# Patient Record
Sex: FEMALE | Race: WHITE | NOT HISPANIC OR LATINO | Employment: UNEMPLOYED | URBAN - METROPOLITAN AREA
[De-identification: names, ages, dates, MRNs, and addresses within clinical notes are randomized per-mention and may not be internally consistent; named-entity substitution may affect disease eponyms.]

---

## 2017-03-18 RX ORDER — GLUCOSAMINE/CHONDR SU A SOD 750-600 MG
5000 TABLET ORAL DAILY
COMMUNITY
End: 2022-03-21

## 2017-03-18 RX ORDER — ACETYLGLUCOSAMINE 700 MG
2000 CAPSULE ORAL 3 TIMES DAILY
COMMUNITY
End: 2018-04-11

## 2017-03-18 RX ORDER — VITAMIN E 268 MG
1000 CAPSULE ORAL DAILY
COMMUNITY

## 2017-03-18 RX ORDER — IBUPROFEN 200 MG
200 TABLET ORAL EVERY 6 HOURS PRN
COMMUNITY
End: 2017-10-16

## 2017-03-18 RX ORDER — LANOLIN ALCOHOL/MO/W.PET/CERES
3000 CREAM (GRAM) TOPICAL DAILY
COMMUNITY

## 2017-03-18 RX ORDER — MINERAL OIL
180 ENEMA (ML) RECTAL DAILY
COMMUNITY
End: 2018-04-11

## 2017-03-18 RX ORDER — CHOLECALCIFEROL (VITAMIN D3) 25 MCG
2000 TABLET ORAL 2 TIMES DAILY
COMMUNITY

## 2017-03-18 RX ORDER — OMEGA-3-ACID ETHYL ESTERS 1 G/1
2 CAPSULE, LIQUID FILLED ORAL DAILY
COMMUNITY
End: 2017-10-16

## 2017-03-18 RX ORDER — ASPIRIN 81 MG/1
81 TABLET ORAL DAILY
COMMUNITY

## 2017-03-21 ENCOUNTER — OFFICE VISIT (OUTPATIENT)
Dept: FAMILY MEDICINE | Facility: CLINIC | Age: 67
End: 2017-03-21
Payer: COMMERCIAL

## 2017-03-21 VITALS
HEIGHT: 68 IN | RESPIRATION RATE: 16 BRPM | WEIGHT: 157.94 LBS | HEART RATE: 68 BPM | BODY MASS INDEX: 23.94 KG/M2 | TEMPERATURE: 98 F | SYSTOLIC BLOOD PRESSURE: 118 MMHG | DIASTOLIC BLOOD PRESSURE: 68 MMHG

## 2017-03-21 DIAGNOSIS — Z78.9 ALCOHOL USE: ICD-10-CM

## 2017-03-21 DIAGNOSIS — J45.20 MILD INTERMITTENT ASTHMA WITHOUT COMPLICATION: ICD-10-CM

## 2017-03-21 DIAGNOSIS — Z00.00 HEALTHCARE MAINTENANCE: ICD-10-CM

## 2017-03-21 DIAGNOSIS — D75.89 MACROCYTOSIS: ICD-10-CM

## 2017-03-21 DIAGNOSIS — Z72.0 NICOTINE ABUSE: Primary | ICD-10-CM

## 2017-03-21 DIAGNOSIS — J30.1 NON-SEASONAL ALLERGIC RHINITIS DUE TO POLLEN: ICD-10-CM

## 2017-03-21 DIAGNOSIS — E55.9 VITAMIN D INSUFFICIENCY: ICD-10-CM

## 2017-03-21 DIAGNOSIS — M85.80 OSTEOPENIA: ICD-10-CM

## 2017-03-21 PROBLEM — F10.90 ALCOHOL USE: Status: ACTIVE | Noted: 2017-03-21

## 2017-03-21 PROCEDURE — 99999 PR PBB SHADOW E&M-EST. PATIENT-LVL III: CPT | Mod: PBBFAC,,, | Performed by: INTERNAL MEDICINE

## 2017-03-21 PROCEDURE — 1160F RVW MEDS BY RX/DR IN RCRD: CPT | Mod: S$GLB,,, | Performed by: INTERNAL MEDICINE

## 2017-03-21 PROCEDURE — 99204 OFFICE O/P NEW MOD 45 MIN: CPT | Mod: S$GLB,,, | Performed by: INTERNAL MEDICINE

## 2017-03-21 RX ORDER — AZELASTINE 1 MG/ML
SPRAY, METERED NASAL
Refills: 5 | COMMUNITY
Start: 2017-01-16 | End: 2018-04-11

## 2017-03-21 RX ORDER — ALENDRONATE SODIUM 35 MG/1
TABLET ORAL
Refills: 2 | COMMUNITY
Start: 2017-01-15 | End: 2017-03-21 | Stop reason: SDUPTHER

## 2017-03-21 RX ORDER — MONTELUKAST SODIUM 10 MG/1
10 TABLET ORAL NIGHTLY
COMMUNITY
End: 2019-07-03

## 2017-03-21 RX ORDER — ALENDRONATE SODIUM 35 MG/1
TABLET ORAL
Qty: 15 TABLET | Refills: 1 | Status: SHIPPED | OUTPATIENT
Start: 2017-03-21 | End: 2017-12-31 | Stop reason: SDUPTHER

## 2017-03-21 NOTE — MR AVS SNAPSHOT
[facility]  [address]  [facility] [address]  Phone: [phone]  Fax: [phone]                  Bev Miguel   3/21/2017 9:00 AM   Office Visit    Description:  Female : 1950   Provider:  Vitaly Cain MD   Department:  [facility]           Reason for Visit     Establish Care           Diagnoses this Visit        Comments    Healthcare maintenance    -  Primary     Mild intermittent asthma without complication         Non-seasonal allergic rhinitis due to pollen         Nicotine abuse         Osteopenia         Alcohol use         Macrocytosis                To Do List           Goals (5 Years of Data)     None      Follow-Up and Disposition     Return in about 6 months (around 2017) for Reassessment and go over her labs.    Follow-up and Disposition History       These Medications        Disp Refills Start End    alendronate (FOSAMAX) 35 MG tablet 15 tablet 1 3/21/2017     TK ONE T PO  WEEKLY    Pharmacy: [facility] [address] [place] [address] AT [facility] Ph #: [phone]         [facility]     [facility] Nurse Care Line -  Assistance  Registered nurses in the [facility] Center provide clinical advisement, health education, appointment booking, and other advisory services.  Call for this free service at [phone].             Medications           START taking these NEW medications        Refills    alendronate (FOSAMAX) 35 MG tablet 1    Sig: TK ONE T PO  WEEKLY    Class: Normal           Verify that the below list of medications is an accurate representation of the medications you are currently taking.  If none reported, the list may be blank. If incorrect, please contact your healthcare provider. Carry this list with you in case of emergency.           Current Medications     aspirin (ECOTRIN) 81 MG EC tablet Take 81 mg by mouth once daily.    biotin 2,500 mcg  "Cap Take 5,000 mg by mouth once daily.    CALCIUM CARB,CIT/D3/PHYTOSTROL (CITRACAL D + HEART HEALTH ORAL) Take 600 mg by mouth 2 (two) times daily.    cyanocobalamin (VITAMIN B-12) 1000 MCG tablet Take 3,000 mcg by mouth once daily.    fexofenadine (ALLEGRA) 180 MG tablet Take 180 mg by mouth once daily.    ibuprofen (ADVIL,MOTRIN) 200 MG tablet Take 200 mg by mouth every 6 (six) hours as needed for Pain.    methylsulfonylmethane (MSM) 1,000 mg Cap Take 2,000 mg by mouth 3 (three) times daily.    montelukast (SINGULAIR) 10 mg tablet Take 10 mg by mouth every evening.    MULTIVIT-MIN/IRON/FOLIC/LUTEIN (CENTRUM SILVER WOMEN ORAL) Take 1 capsule by mouth once daily.    vitamin D 1000 units Tab Take 4,000 mg by mouth once daily.    vitamin E 400 UNIT capsule Take 400 Units by mouth once daily.    alendronate (FOSAMAX) 35 MG tablet TK ONE T PO  WEEKLY    azelastine (ASTELIN) 137 mcg (0.1 %) nasal spray U 2 SPRAYS IEN QD    omega-3 acid ethyl esters (LOVAZA) 1 gram capsule Take 2 g by mouth once daily.           Clinical Reference Information           Your Vitals Were     BP Pulse Temp Resp Height Weight    118/68 (BP Location: Right arm, Patient Position: Sitting) 68 98.2 °F (36.8 °C) (Oral) 16 5' 7.5" (1.715 m) 71.6 kg (157 lb 15.4 oz)    BMI                24.37 kg/m2          Blood Pressure          Most Recent Value    BP  118/68      Allergies as of 3/21/2017     Benadryl [Diphenhydramine Hcl]    Biaxin [Clarithromycin]    Iodine And Iodide Containing Products    Levaquin [Levofloxacin]    Sulfa (Sulfonamide Antibiotics)    Trazodone    Zyban [Bupropion Hcl (Smoking Deter)]    Bactrim [Sulfamethoxazole-trimethoprim]      Immunizations Administered on Date of Encounter - 3/21/2017     None      Orders Placed During Today's Visit     Future Labs/Procedures Expected by Expires    CBC auto differential  3/21/2017 5/20/2018    Folate  3/21/2017 5/20/2018    T4, free  3/21/2017 5/20/2018    TSH  3/21/2017 5/20/2018    " Vitamin B12  3/21/2017 5/20/2018    Comprehensive metabolic panel  6/19/2017 5/20/2018    Lipid panel  6/19/2017 5/20/2018      [facility] Sign-Up     Activating your [facility] account is as easy as 1-2-3!     1) Visit [email or web address], select Sign Up Now, enter this activation code and your date of birth, then select Next.  [email or web address]  Expires: 5/5/2017 10:29 AM      2) Create a username and password to use when you visit [facility] in the future and select a security question in case you lose your password and select Next.    3) Enter your e-mail address and click Sign Up!    Additional Information  If you have questions, please e-mail [email or web address] or call [phone] to talk to our [facility] staff. Remember, [facility] is NOT to be used for urgent needs. For medical emergencies, dial 911.         Smoking Cessation     If you would like to quit smoking:   You may be eligible for free services if you are a [place] resident and started smoking cigarettes before September 1, 1988.  Call the Smoking Cessation [employer] ([facility]) toll free at (924) 490-9633 or ([phone].   Call 0-339-QUIT-NOW if you do not meet the above criteria.            Language Assistance Services     ATTENTION: Language assistance services are available, free of charge. Please call [phone].      ATENCIÓN: Si habla español, tiene a russo disposición servicios gratuitos de asistencia lingüística. Llame al [phone].     CHÚ Ý: N?u b?n nói Ti?ng Vi?t, có các d?ch v? h? tr? ngôn ng? mi?n phí dành cho b?n. G?i s? [phone].         [facility] complies with applicable Federal civil rights laws and does not discriminate on the basis of race, color, national origin, age, disability, or sex.

## 2017-03-21 NOTE — PROGRESS NOTES
Subjective:       Patient ID: Bev Miguel is a 66 y.o. female.    Chief Complaint: Establish Care (reestablish at [facility])    HPI agents a very pleasant 66-year-old lady established patient of mine here to get reestablished with me at the [facility].  Last office visit apparently 10/4/16.  Past medical history includes a bilateral mastectomy for breast cancer with a history of breast reconstruction on 5/28/10.  Right breast was involved with cancer.  She had no x-ray treatment or chemotherapy.  She did take femara for 5 years;also with oophorectomy for ovarian cancer with a history of uterine cancer as well.   She has a history of colon polyps but month but total colonoscopy on 2/20/17 was negative for polyps GI doctor was Dr. March.  He recommended a 5 year repeat at that time.  She however still continues to smoke.  She knows she needs to wean off the cigarettes needs to quit smoking as been recommended in the past that she try nicotine patch 14 mg daily and wean as directed or for consideration of Nicorette gum as well she is presently down to 6 cigarettes per day she's been recommended to call [email or web address] nvquit-now for counseling and advice; she's also been offered Nicotrol as a substitute for the cigarettes however presently declined; Chantix and Zyban were declined as well.  Of note, mom has osteoporosis.  Last DEXA scan was reportedly January 2006 at [facility] at that time with femoral necks averaging -2.2 T score;  Past medical history and surgical medical history as well as social and family history all obtained and discussed at length and documented.  Review of systems was also obtained before physical exam.  Various diagnosis's were discussed including plan of care and discussion of her medications.  Appropriate labs were ordered on follow-up.  Last office visit was reviewed as well from 10/4/16.  Labs were also reviewed.  Total time 927 to 10:10 AM    Review of Systems  "  Constitutional: Negative for appetite change, fever and unexpected weight change.   HENT:         seasonal ALLERGIES / perennial ALLERGIES   Eyes: Negative for discharge.   Respiratory: Negative for cough, chest tightness, shortness of breath and wheezing.    Cardiovascular: Negative for chest pain, palpitations and leg swelling.   Gastrointestinal: Negative for abdominal distention, abdominal pain, blood in stool, constipation, diarrhea, nausea and vomiting.        Denies melena as well   Endocrine:        None     Genitourinary: Negative for dysuria and hematuria.        Slight urine sporatic incont. Occ urgency; see  if worsens.   Musculoskeletal: Positive for back pain. Negative for arthralgias and myalgias.        Chr LBP; [facility].   Skin: Negative for rash.   Allergic/Immunologic:         Seasonal/ perennial ALLERGIES.   Neurological: Negative for tremors, seizures and syncope.   Hematological: Negative for adenopathy. Does not bruise/bleed easily.        Macrocytosis; etoh use.   Psychiatric/Behavioral:        Denies anxiety or depression       Objective:      Vitals:    03/21/17 0911   BP: 118/68   BP Location: Right arm   Patient Position: Sitting   Pulse: 68   Resp: 16   Temp: 98.2 °F (36.8 °C)   TempSrc: Oral   Weight: 71.6 kg (157 lb 15.4 oz)   Height: 5' 7.5" (1.715 m)     Body mass index is 24.37 kg/(m^2).    Physical Exam   Constitutional: She is oriented to person, place, and time. She appears well-developed and well-nourished.   HENT:   Head: Normocephalic and atraumatic.   Eyes: EOM are normal.   Neck: Normal range of motion. Neck supple. No thyromegaly present.   Cardiovascular: Normal rate, regular rhythm and normal heart sounds.  Exam reveals no gallop.    No murmur heard.  Pulmonary/Chest: Effort normal and breath sounds normal. No respiratory distress. She has no wheezes. She has no rales.   Abdominal: Soft. Bowel sounds are normal. She exhibits no " distension. There is no tenderness. There is no rebound and no guarding.   Musculoskeletal: Normal range of motion. She exhibits no edema.   Lymphadenopathy:     She has no cervical adenopathy.   Neurological: She is alert and oriented to person, place, and time.   Moves all 4 extremities fine.   Skin: No rash noted.   Psychiatric: She has a normal mood and affect. Her behavior is normal. Thought content normal.   Vitals reviewed.      Assessment:       1. Nicotine abuse    2. Osteopenia    3. Vitamin D insufficiency    4. Alcohol use    5. Mild intermittent asthma without complication    6. Non-seasonal allergic rhinitis due to pollen    7. Macrocytosis    8. Healthcare maintenance        Plan:       Nicotine abuse; she needs to wean off her cigarettes presently at 6 per day; she does not desire Chantix or Zyban.  I recommended she try Nicorette gum or nicotine patches to finish assisting her with weaning off her cigarettes; has also been advised to call [phone] quit- now for counseling and assistance.    Osteopenia; needs to do weightbearing exercises along with calcium supplementation and vitamin D, and continue her Fosamax 35 mg weekly; DEXA scan due 1/16/18; getting off the cigarettes and her alcohol will help her bone mineral density of note  -     TSH; Future; Expected date: 3/21/17  -     T4, free; Future; Expected date: 3/21/17  -     alendronate (FOSAMAX) 35 MG tablet; TK ONE T PO  WEEKLY  Dispense: 15 tablet; Refill: 1    Vitamin D insufficiency; to continue present supplementation and will check levels on follow-up    Alcohol use; to wean down her alcohol use; has a history of macrocytosis with normal B6 and B12 levels  -     Comprehensive metabolic panel; Future; Expected date: 6/19/17    Mild intermittent asthma without complication; as been stable with her not needing to use her rescue inhaler  -     CBC auto differential; Future; Expected date: 3/21/17    Non-seasonal allergic rhinitis due to pollen;  she sees Dr. Jeet krueger for her ALLERGIES has been receiving ALLERGY shots for 3-1/2 years at least; quitting her cigarettes will help this as well    Macrocytosis; decreasing her alcohol intake will help this entity; B12 and folate have been normal in the past  -     CBC auto differential; Future; Expected date: 3/21/17  -     Vitamin B12; Future; Expected date: 3/21/17  -     Folate; Future; Expected date: 3/21/17    Healthcare maintenance; up her follow-up with / general surgery as recommended and directed.  Follow-up with her GYN doctor as directed as well.  Patient desires 6 month follow-up with us for her annual wellness evaluation including labs  -     Comprehensive metabolic panel; Future; Expected date: 6/19/17  -     Lipid panel; Future; Expected date: 6/19/17  -     T4, free; Future; Expected date: 3/21/17

## 2017-03-25 PROBLEM — E55.9 VITAMIN D INSUFFICIENCY: Status: ACTIVE | Noted: 2017-03-25

## 2017-04-17 PROBLEM — I73.00 RAYNAUD'S DISEASE WITHOUT GANGRENE: Status: ACTIVE | Noted: 2017-04-17

## 2017-04-17 PROBLEM — I34.0 NON-RHEUMATIC MITRAL REGURGITATION: Status: ACTIVE | Noted: 2017-04-17

## 2017-04-17 PROBLEM — I51.89 DIASTOLIC DYSFUNCTION: Status: ACTIVE | Noted: 2017-04-17

## 2017-04-18 ENCOUNTER — OFFICE VISIT (OUTPATIENT)
Dept: CARDIOLOGY | Facility: CLINIC | Age: 67
End: 2017-04-18
Payer: COMMERCIAL

## 2017-04-18 VITALS
SYSTOLIC BLOOD PRESSURE: 136 MMHG | HEART RATE: 82 BPM | DIASTOLIC BLOOD PRESSURE: 77 MMHG | WEIGHT: 157.44 LBS | BODY MASS INDEX: 23.86 KG/M2 | HEIGHT: 68 IN

## 2017-04-18 DIAGNOSIS — Z72.0 TOBACCO USE: ICD-10-CM

## 2017-04-18 DIAGNOSIS — I73.00 RAYNAUD'S DISEASE WITHOUT GANGRENE: Primary | ICD-10-CM

## 2017-04-18 DIAGNOSIS — I34.0 NON-RHEUMATIC MITRAL REGURGITATION: ICD-10-CM

## 2017-04-18 PROCEDURE — 99214 OFFICE O/P EST MOD 30 MIN: CPT | Mod: S$GLB,,, | Performed by: INTERNAL MEDICINE

## 2017-04-18 PROCEDURE — 99999 PR PBB SHADOW E&M-EST. PATIENT-LVL III: CPT | Mod: PBBFAC,,, | Performed by: INTERNAL MEDICINE

## 2017-04-18 RX ORDER — FLUTICASONE PROPIONATE 50 MCG
SPRAY, SUSPENSION (ML) NASAL
Refills: 4 | COMMUNITY
Start: 2017-04-13 | End: 2018-04-11

## 2017-04-18 RX ORDER — AMLODIPINE BESYLATE 2.5 MG/1
1.25 TABLET ORAL DAILY
Qty: 15 TABLET | Refills: 5 | Status: SHIPPED | OUTPATIENT
Start: 2017-04-18 | End: 2017-10-16 | Stop reason: SDUPTHER

## 2017-04-18 NOTE — MR AVS SNAPSHOT
[place] [facility]  [address] [facility] [address]  [facility] [address]  Phone: [phone]                  Bev Miguel   2017 10:00 AM   Office Visit    Description:  Female : 1950   Provider:  John Reynolds MD   Department:  [place] [facility]           Reason for Visit     Hypertension           Diagnoses this Visit        Comments    Raynaud's disease without gangrene    -  Primary     Non-rheumatic mitral regurgitation         Tobacco use                To Do List           Goals (5 Years of Data)     None      Follow-Up and Disposition     Return in about 6 months (around 10/18/2017).       These Medications        Disp Refills Start End    amlodipine (NORVASC) 2.5 MG tablet 15 tablet 5 2017    Take 0.5 tablets (1.25 mg total) by mouth once daily. - Oral    Pharmacy: [facility] [address] [place] [address] AT [facility]  22 Ph #: [phone]         [facility]     [facility] Care [facility] -  Assistance  Unless otherwise directed by your provider, please contact [facility], our nurse care line that is available for  assistance.     Registered nurses in the [facility] provide: appointment scheduling, clinical advisement, health education, and other advisory services.  Call: [phone] (toll free)               Medications           START taking these NEW medications        Refills    amlodipine (NORVASC) 2.5 MG tablet 5    Sig: Take 0.5 tablets (1.25 mg total) by mouth once daily.    Class: Normal    Route: Oral           Verify that the below list of medications is an accurate representation of the medications you are currently taking.  If none reported, the list may be blank. If incorrect, please contact your healthcare provider. Carry this list with you in case of emergency.           Current Medications     alendronate (FOSAMAX) 35 MG tablet TK ONE T PO  WEEKLY    aspirin (ECOTRIN) 81 MG EC  "tablet Take 81 mg by mouth once daily.    azelastine (ASTELIN) 137 mcg (0.1 %) nasal spray U 2 SPRAYS IEN QD    biotin 2,500 mcg Cap Take 5,000 mg by mouth once daily.    CALCIUM CARB,CIT/D3/PHYTOSTROL (CITRACAL D + HEART HEALTH ORAL) Take 600 mg by mouth 2 (two) times daily.    cyanocobalamin (VITAMIN B-12) 1000 MCG tablet Take 3,000 mcg by mouth once daily.    fexofenadine (ALLEGRA) 180 MG tablet Take 180 mg by mouth once daily.    fluticasone (FLONASE) 50 mcg/actuation nasal spray SHAKE LQ AND U 1 SPR IEN BID    ibuprofen (ADVIL,MOTRIN) 200 MG tablet Take 200 mg by mouth every 6 (six) hours as needed for Pain.    methylsulfonylmethane (MSM) 1,000 mg Cap Take 2,000 mg by mouth 3 (three) times daily.    montelukast (SINGULAIR) 10 mg tablet Take 10 mg by mouth every evening.    MULTIVIT-MIN/IRON/FOLIC/LUTEIN (CENTRUM SILVER WOMEN ORAL) Take 1 capsule by mouth once daily.    vitamin D 1000 units Tab Take 4,000 mg by mouth once daily.    vitamin E 400 UNIT capsule Take 400 Units by mouth once daily.    amlodipine (NORVASC) 2.5 MG tablet Take 0.5 tablets (1.25 mg total) by mouth once daily.    omega-3 acid ethyl esters (LOVAZA) 1 gram capsule Take 2 g by mouth once daily.           Clinical Reference Information           Your Vitals Were     BP Pulse Height Weight BMI    136/77 82 5' 8" (1.727 m) 71.4 kg (157 lb 6.5 oz) 23.93 kg/m2      Blood Pressure          Most Recent Value    BP  136/77      Allergies as of 4/18/2017     Benadryl [Diphenhydramine Hcl]    Biaxin [Clarithromycin]    Iodine And Iodide Containing Products    Levaquin [Levofloxacin]    Sulfa (Sulfonamide Antibiotics)    Trazodone    Zyban [Bupropion Hcl (Smoking Deter)]    Bactrim [Sulfamethoxazole-trimethoprim]      Immunizations Administered on Date of Encounter - 4/18/2017     None      [facility] Sign-Up     Activating your [facility] account is as easy as 1-2-3!     1) Visit [email or web address], select Sign Up Now, enter this activation code and your " date of birth, then select Next.  [email or web address]  Expires: 5/5/2017 10:29 AM      2) Create a username and password to use when you visit [facility] in the future and select a security question in case you lose your password and select Next.    3) Enter your e-mail address and click Sign Up!    Additional Information  If you have questions, please e-mail [email or web address] or call [phone] to talk to our [facility] staff. Remember, [facility] is NOT to be used for urgent needs. For medical emergencies, dial 911.         Instructions      Understanding E-Cigarettes  E-cigarettes have become a popular form of smoking. People may use these devices in place of regular cigarettes. Or they may use them to try to quit smoking altogether.  What are e-cigarettes?  E-cigarettes are devices that allow users to breath in liquid that has nicotine in it. They are also known as electronic nicotine delivery systems. They may look like regular cigarettes, cigars, or pipes. Some are even made to look like everyday items, such as flashlights or pens.  How do you use an e-cigarette?  An e-cigarette has 3 parts. It has a battery, a heating device, and a cartridge or tank. The part that heats up is called an atomizer. To use it, you put in a cartridge or fill the tank with a liquid. This liquid contains nicotine. It may also have other chemicals and flavorings. When the e-cigarette is puffed, the atomizer heats up. It turns the liquid in the tank or cartridge into an aerosol. You then breathe in this vapor. The act is called vaping. It mimics real cigarette smoking.  Who is using e-cigarettes?  More and more people are puffing on e-cigarettes. Current and former smokers of tobacco are heavy users. So, too, are middle and high school students. In fact, e-cigarettes are now students preferred form of smoking. Marketing plays a large part. Flavors--such as coffee, mint, and cherry--may tempt young people to try these products. The  same marketing strategies used to addict people to tobacco cigarettes are now being used with e-cigarettes.  Can e-cigarettes help smokers quit?  Proponents say that e-cigarettes may help smokers kick the habit. But more research is needed to find out how well they work as a stop-smoking aid--and how safe they are. Users are still exposed to nicotine. And some smokers use both normal cigarettes and e-cigarettes. They may choose an e-cigarette in places where other smoking products are banned.  Experts worry that a smoker who uses e-cigarettes may not try other, proven ways to quit. A number of quit-smoking tools are available that have been approved by the FDA. If you are trying to quit smoking, see your healthcare provider for help.  Are they safer than traditional cigarettes?  Little research has been done on e-cigarettes. Because of this, experts do not know how much nicotine or other possibly harmful chemicals users are inhaling. They also do not know the short- and long-term risks of vaping.  But e-cigarettes may seem safer than other forms of smoking. Users dont breathe in burning tobacco and its many toxins. These include tar, ammonia, and arsenic. But they may still be exposed to other harmful substances. The vapor may have heavy metals and chemicals like formaldehyde in it. Flavorings may also hide cancer-causing chemicals and other toxins. Its also not clear if the vapor puffed into the air puts nonsmokers exposed to it at risk for health problems.  Users of e-cigarettes are still getting nicotine. Its a very addictive substance. That ability to addict is a special concern for young users. Teens who get addicted to e-cigarettes may switch to regular cigarettes. This can then lead to the serious health problems caused by smoking tobacco.  At high doses, nicotine can cause dizziness and vomiting. Users who refill their own cartridges are at a greater risk for unsafe levels of the drug. Nicotine poisoning  is a major concern in children. Children younger than 5 have been harmed after accidentally coming into contact with the nicotine liquid.  Are there any laws against using e-cigarettes?  The FDA recently ruled to regulate e-cigarettes. They are considered a tobacco product. Makers of these devices have to follow certain rules on safety and advertising. They also cant sell or market e-cigarettes to minors.    Date Last Reviewed: 4/6/2016 © 2000-2016 [employer]. [address] [place] [address], [place] [address]. All rights reserved. This information is not intended as a substitute for professional medical care. Always follow your healthcare professional's instructions.        Understanding Mitral Valve Regurgitation    Mitral valve regurgitation is when the mitral valve in the heart is leaky. It lets some blood flow backwards when the ventricle squeezes.  How mitral valve regurgitation happens  The mitral valve is one of the hearts 4 valves. Normally, these valves help the blood flow through the hearts 4 chambers and out to the body without leaking backwards. The mitral valve lies between the left atrium and the left ventricle. Normally, the mitral valve stops blood from flowing back into the left atrium from the left ventricle when the ventricle squeezes. This maximizes forward blood flow through the heart.  With mitral valve regurgitation, some blood leaks back through the valve. This makes the heart have to work harder to get blood out to your body. When this blood is regurgitated backwards in the heart, it increases the pressure within the heart which can lead to muscle damage as well as high blood pressure in the lungs.  Mitral valve regurgitation can increase risk for other heart rhythm problems, such as atrial fibrillation (AFib). This abnormal heart rhythm results in fast and irregular heartbeats which can also lower the heart's pumping ability and increases the risk for stroke.  Mitral valve  regurgitation can be acute or chronic. If its acute, the valve suddenly becomes leaky. In this case, the heart doesnt have time to adapt to the leak in the valve. Symptoms are often severe. If its chronic, the valve leaks more and more over time. The heart has time to adapt to the leak.  What causes mitral valve regurgitation?  Mitral valve regurgitation can be caused by various things, such as:  · Heart attack or coronary artery disease  · Natural aging that can damage the mitral valve  · Tearing of the tissue or muscle that supports the mitral valve such as due to an injury  · A redundant or floppy mitral valve, called mitral valve prolapse  · Rheumatic heart disease caused by untreated Streptococcus infection. Strep are the bacteria that cause strep throat.  · Certain autoimmune diseases, such as rheumatoid arthritis  · Infection of the heart valves (endocarditis)  · Problems with the mitral valve that are present at birth  · Certain medicines  You can reduce some risk factors for mitral valve regurgitation. For example:  · Use antibiotics as directed to treat a strep infection and prevent rheumatic heart disease.  · Reduce the risk for heart valve infection by not injecting illegal drugs.  · Manage risk factors that can lead to a heart attack or chronic heart artery disease.  There are other risk factors that you cant change. For example, some conditions that can lead to mitral valve regurgitation are partly genetic.  Symptoms of mitral valve regurgitation  Chronic mitral valve regurgitation often doesnt cause symptoms for a long time. Mild or moderate mitral regurgitation often doesnt cause any symptoms. If the condition becomes more severe, you may have symptoms. They may get worse and happen more often over time. Symptoms may include:  · Shortness of breath with physical activity  · Shortness of breath when lying flat  · Feeling tired  · Less ability to exercise  · Awareness of your  heartbeat  · Swelling in your legs, abdomen, and the veins in your neck  · Chest pain (less common)  Acute, severe mitral valve regurgitation is a medical emergency that can cause serious symptoms such as:  · Symptoms of shock (pale skin, loss of consciousness, rapid breathing)  · Severe shortness of breath  · Arrhythmias that make the heart unable to pump blood well  Diagnosing mitral valve regurgitation  Your healthcare provider will ask about your health history. He or she will give you a physical exam. Using a stethoscope, your healthcare provider will listen to your heart. This is to check for sounds called heart murmurs and other signs that the heart isnt pumping normally. You may also have tests such as:  · Echocardiogram, to look at the structure of the heart using sound waves  · Stress echocardiogram, to see how well the heart does during exercise  · Electrocardiogram (EKG), to check the hearts rhythm  · Cardiac MRI, transesophageal echocardiogram, or cardiac catheterization, if more information is needed  Date Last Reviewed: 6/1/2016  © 8439-2394 [employer]. [address] [place] [address]. All rights reserved. This information is not intended as a substitute for professional medical care. Always follow your healthcare professional's instructions.        Raynaud Disease  Your healthcare provider has told you that you have Raynaud disease. It is also called Raynaud phenomenon or Raynaud syndrome. There is no cure for Raynaud disease, but you can manage it to help prevent attacks.    What are the symptoms of Raynaud disease?  A Raynaud disease attack is often triggered by cold or stress. During an attack, blood vessels suddenly narrow (called vasospasm).  This most often happens in fingers and toes. In rare cases, the nose, ears, or even tongue are affected. Narrowed blood vessels reduce the blood supply to the area. The area then turns white, then blue. The area may feel numb or  painful. As the attack passes, the blood vessels open. The affected area may turn bright red as it warms up, then returns to normal color.  What is the cause of Raynaud disease?  With Raynaud disease, it is believed that blood vessels in the affected areas overrespond to certain triggers, such as cold. This makes them narrow (called vasospasm) much more than in people without the disease. What causes the blood vessels to react so strongly to certain triggers is unknown. In between attacks, the blood vessels are normal and healthy. Attacks dont permanently damage the blood vessels, but may thicken the artery walls.   In some cases, Raynaud disease happens along with another disease or condition. This is often a connective tissue disorder, such as lupus, scleroderma, or rheumatoid arthritis. This is called secondary Raynaud disease (as opposed to primary Raynaud disease discussed above) and may be more severe. If this is the case for you, you and your healthcare provider can discuss treatment for the underlying condition.  What are the risk factors?  Risk factors for Raynaud disease include:  · Women are more likely to get Raynaud disease than men.  · Younger individuals are at higher risk, usually ages 15 to 30.  · Living in colder climates increases risk.  · Having a family member with Raynaud disease increases one's risk.  · Underlying rheumatoid conditions may increase one's risk.   What are possible triggers?  Triggers for Raynaud disease include:   · Cold  · Stress  · Caffeine  · Smoking  · Repetitive movements  · Certain medicines, such as beta-blockers, migraine medicine, birth control pills and others  · Injury  How is Raynaud disease diagnosed?  Your description of your symptoms, a health history, and a physical exam are often enough for a diagnosis. Blood tests and other tests may be done to see if any underlying conditions are present and rule out other problems.  How is Raynaud disease treated?  There is  no cure for Raynaud disease. But you can control symptoms and reduce the number and severity of attacks. For most people, avoiding triggers is enough to limit attacks. Your healthcare provider may suggest the following:  · Take precautions to help prevent your hands and feet from losing circulation. This includes:  ¨ Dressing warmly in cold weather.  ¨ Wear gloves or mittens when your hands may become cold, such as when you use the refrigerator or freezer.  ¨ Avoid stress and caffeine.  ¨ Exercise regularly. This may reduce the number and severity of attacks.   ¨ If you smoke, quitting may improve the condition. This is because smoking causes your blood vessels to narrow and reduces blood flow.  · Soak your hands or feet in warm (not hot) water. Do this at the first sign of attack. Keep soaking until your skin color returns to normal.  In some people, symptoms are persistent or troubling. For these cases, other treatments are a choice. Your healthcare provider can tell you more about the following:  · Prescription medicines that relax and widen blood vessels, such as calcium channel blockers. These may help relieve symptoms.  · Nerve surgery for severe cases that dont respond to other treatments. Surgery removes the nerves that surround the blood vessels in the hands and feet. Without nerve stimulation, the blood vessels stay more relaxed. They are less likely to become very narrow due to stimulus. Nerves may be blocked using injections in some cases.  Most cases of Raynaud disease are not cause for concern. The disease doesnt get worse and isnt likely to cause any permanent damage. If attacks are severe, very prolonged, or very often, skin damage may result. Controlling attacks can help prevent this.  When to seek medical care  The following problems happen rarely, but they can be serious. Call your healthcare provider right away if you notice any of the following:  · Infection or sores on the skin  · A finger  or toe turns black  · The skin breaks open on its own  · A rash develops  · A finger or toe joint becomes painful or swollen   Date Last Reviewed: 1/27/2016  © 3765-1401 [employer]. [address] [place] [address], [place] [address]. All rights reserved. This information is not intended as a substitute for professional medical care. Always follow your healthcare professional's instructions.             Smoking Cessation     If you would like to quit smoking:   You may be eligible for free services if you are a [place] resident and started smoking cigarettes before September 1, 1988.  Call the Smoking Cessation Trust (SCT) toll free at (556) 536-3388 or (770) 129-3258.   Call 1-800-QUIT-NOW if you do not meet the above criteria.   Contact us via email: [email or web address]   View our website for more information: [email or web address]        Language Assistance Services     ATTENTION: Language assistance services are available, free of charge. Please call [phone].      ATENCIÓN: Si nikunjla shira, tiene a russo disposición servicios gratuitos de asistencia lingüística. Llame al [phone].     CHÚ Ý: N?u b?n nói Ti?ng Vi?t, có các d?ch v? h? tr? ngôn ng? mi?n phí dành cho b?n. G?i s? [phone].         [facility] complies with applicable Federal civil rights laws and does not discriminate on the basis of race, color, national origin, age, disability, or sex.

## 2017-04-18 NOTE — PATIENT INSTRUCTIONS
Understanding E-Cigarettes  E-cigarettes have become a popular form of smoking. People may use these devices in place of regular cigarettes. Or they may use them to try to quit smoking altogether.  What are e-cigarettes?  E-cigarettes are devices that allow users to breath in liquid that has nicotine in it. They are also known as electronic nicotine delivery systems. They may look like regular cigarettes, cigars, or pipes. Some are even made to look like everyday items, such as flashlights or pens.  How do you use an e-cigarette?  An e-cigarette has 3 parts. It has a battery, a heating device, and a cartridge or tank. The part that heats up is called an atomizer. To use it, you put in a cartridge or fill the tank with a liquid. This liquid contains nicotine. It may also have other chemicals and flavorings. When the e-cigarette is puffed, the atomizer heats up. It turns the liquid in the tank or cartridge into an aerosol. You then breathe in this vapor. The act is called vaping. It mimics real cigarette smoking.  Who is using e-cigarettes?  More and more people are puffing on e-cigarettes. Current and former smokers of tobacco are heavy users. So, too, are middle and high school students. In fact, e-cigarettes are now students preferred form of smoking. Marketing plays a large part. Flavors--such as coffee, mint, and cherry--may tempt young people to try these products. The same marketing strategies used to addict people to tobacco cigarettes are now being used with e-cigarettes.  Can e-cigarettes help smokers quit?  Proponents say that e-cigarettes may help smokers kick the habit. But more research is needed to find out how well they work as a stop-smoking aid--and how safe they are. Users are still exposed to nicotine. And some smokers use both normal cigarettes and e-cigarettes. They may choose an e-cigarette in places where other smoking products are banned.  Experts worry that a smoker who uses e-cigarettes  may not try other, proven ways to quit. A number of quit-smoking tools are available that have been approved by the FDA. If you are trying to quit smoking, see your healthcare provider for help.  Are they safer than traditional cigarettes?  Little research has been done on e-cigarettes. Because of this, experts do not know how much nicotine or other possibly harmful chemicals users are inhaling. They also do not know the short- and long-term risks of vaping.  But e-cigarettes may seem safer than other forms of smoking. Users dont breathe in burning tobacco and its many toxins. These include tar, ammonia, and arsenic. But they may still be exposed to other harmful substances. The vapor may have heavy metals and chemicals like formaldehyde in it. Flavorings may also hide cancer-causing chemicals and other toxins. Its also not clear if the vapor puffed into the air puts nonsmokers exposed to it at risk for health problems.  Users of e-cigarettes are still getting nicotine. Its a very addictive substance. That ability to addict is a special concern for young users. Teens who get addicted to e-cigarettes may switch to regular cigarettes. This can then lead to the serious health problems caused by smoking tobacco.  At high doses, nicotine can cause dizziness and vomiting. Users who refill their own cartridges are at a greater risk for unsafe levels of the drug. Nicotine poisoning is a major concern in children. Children younger than 5 have been harmed after accidentally coming into contact with the nicotine liquid.  Are there any laws against using e-cigarettes?  The FDA recently ruled to regulate e-cigarettes. They are considered a tobacco product. Makers of these devices have to follow certain rules on safety and advertising. They also cant sell or market e-cigarettes to minors.    Date Last Reviewed: 4/6/2016  © 7637-9070 [employer]. [address] [place] [address], [place] [address]. All rights reserved.  This information is not intended as a substitute for professional medical care. Always follow your healthcare professional's instructions.        Understanding Mitral Valve Regurgitation    Mitral valve regurgitation is when the mitral valve in the heart is leaky. It lets some blood flow backwards when the ventricle squeezes.  How mitral valve regurgitation happens  The mitral valve is one of the hearts 4 valves. Normally, these valves help the blood flow through the hearts 4 chambers and out to the body without leaking backwards. The mitral valve lies between the left atrium and the left ventricle. Normally, the mitral valve stops blood from flowing back into the left atrium from the left ventricle when the ventricle squeezes. This maximizes forward blood flow through the heart.  With mitral valve regurgitation, some blood leaks back through the valve. This makes the heart have to work harder to get blood out to your body. When this blood is regurgitated backwards in the heart, it increases the pressure within the heart which can lead to muscle damage as well as high blood pressure in the lungs.  Mitral valve regurgitation can increase risk for other heart rhythm problems, such as atrial fibrillation (AFib). This abnormal heart rhythm results in fast and irregular heartbeats which can also lower the heart's pumping ability and increases the risk for stroke.  Mitral valve regurgitation can be acute or chronic. If its acute, the valve suddenly becomes leaky. In this case, the heart doesnt have time to adapt to the leak in the valve. Symptoms are often severe. If its chronic, the valve leaks more and more over time. The heart has time to adapt to the leak.  What causes mitral valve regurgitation?  Mitral valve regurgitation can be caused by various things, such as:  · Heart attack or coronary artery disease  · Natural aging that can damage the mitral valve  · Tearing of the tissue or muscle that supports the  mitral valve such as due to an injury  · A redundant or floppy mitral valve, called mitral valve prolapse  · Rheumatic heart disease caused by untreated Streptococcus infection. Strep are the bacteria that cause strep throat.  · Certain autoimmune diseases, such as rheumatoid arthritis  · Infection of the heart valves (endocarditis)  · Problems with the mitral valve that are present at birth  · Certain medicines  You can reduce some risk factors for mitral valve regurgitation. For example:  · Use antibiotics as directed to treat a strep infection and prevent rheumatic heart disease.  · Reduce the risk for heart valve infection by not injecting illegal drugs.  · Manage risk factors that can lead to a heart attack or chronic heart artery disease.  There are other risk factors that you cant change. For example, some conditions that can lead to mitral valve regurgitation are partly genetic.  Symptoms of mitral valve regurgitation  Chronic mitral valve regurgitation often doesnt cause symptoms for a long time. Mild or moderate mitral regurgitation often doesnt cause any symptoms. If the condition becomes more severe, you may have symptoms. They may get worse and happen more often over time. Symptoms may include:  · Shortness of breath with physical activity  · Shortness of breath when lying flat  · Feeling tired  · Less ability to exercise  · Awareness of your heartbeat  · Swelling in your legs, abdomen, and the veins in your neck  · Chest pain (less common)  Acute, severe mitral valve regurgitation is a medical emergency that can cause serious symptoms such as:  · Symptoms of shock (pale skin, loss of consciousness, rapid breathing)  · Severe shortness of breath  · Arrhythmias that make the heart unable to pump blood well  Diagnosing mitral valve regurgitation  Your healthcare provider will ask about your health history. He or she will give you a physical exam. Using a stethoscope, your healthcare provider will  listen to your heart. This is to check for sounds called heart murmurs and other signs that the heart isnt pumping normally. You may also have tests such as:  · Echocardiogram, to look at the structure of the heart using sound waves  · Stress echocardiogram, to see how well the heart does during exercise  · Electrocardiogram (EKG), to check the hearts rhythm  · Cardiac MRI, transesophageal echocardiogram, or cardiac catheterization, if more information is needed  Date Last Reviewed: 6/1/2016 © 2000-2016 [employer] [address] [place] [id number]. All rights reserved. This information is not intended as a substitute for professional medical care. Always follow your healthcare professional's instructions.        Raynaud Disease  Your healthcare provider has told you that you have Raynaud disease. It is also called Raynaud phenomenon or Raynaud syndrome. There is no cure for Raynaud disease, but you can manage it to help prevent attacks.    What are the symptoms of Raynaud disease?  A Raynaud disease attack is often triggered by cold or stress. During an attack, blood vessels suddenly narrow (called vasospasm).  This most often happens in fingers and toes. In rare cases, the nose, ears, or even tongue are affected. Narrowed blood vessels reduce the blood supply to the area. The area then turns white, then blue. The area may feel numb or painful. As the attack passes, the blood vessels open. The affected area may turn bright red as it warms up, then returns to normal color.  What is the cause of Raynaud disease?  With Raynaud disease, it is believed that blood vessels in the affected areas overrespond to certain triggers, such as cold. This makes them narrow (called vasospasm) much more than in people without the disease. What causes the blood vessels to react so strongly to certain triggers is unknown. In between attacks, the blood vessels are normal and healthy. Attacks dont permanently  damage the blood vessels, but may thicken the artery walls.   In some cases, Raynaud disease happens along with another disease or condition. This is often a connective tissue disorder, such as lupus, scleroderma, or rheumatoid arthritis. This is called secondary Raynaud disease (as opposed to primary Raynaud disease discussed above) and may be more severe. If this is the case for you, you and your healthcare provider can discuss treatment for the underlying condition.  What are the risk factors?  Risk factors for Raynaud disease include:  · Women are more likely to get Raynaud disease than men.  · Younger individuals are at higher risk, usually ages 15 to 30.  · Living in colder climates increases risk.  · Having a family member with Raynaud disease increases one's risk.  · Underlying rheumatoid conditions may increase one's risk.   What are possible triggers?  Triggers for Raynaud disease include:   · Cold  · Stress  · Caffeine  · Smoking  · Repetitive movements  · Certain medicines, such as beta-blockers, migraine medicine, birth control pills and others  · Injury  How is Raynaud disease diagnosed?  Your description of your symptoms, a health history, and a physical exam are often enough for a diagnosis. Blood tests and other tests may be done to see if any underlying conditions are present and rule out other problems.  How is Raynaud disease treated?  There is no cure for Raynaud disease. But you can control symptoms and reduce the number and severity of attacks. For most people, avoiding triggers is enough to limit attacks. Your healthcare provider may suggest the following:  · Take precautions to help prevent your hands and feet from losing circulation. This includes:  ¨ Dressing warmly in cold weather.  ¨ Wear gloves or mittens when your hands may become cold, such as when you use the refrigerator or freezer.  ¨ Avoid stress and caffeine.  ¨ Exercise regularly. This may reduce the number and severity of  attacks.   ¨ If you smoke, quitting may improve the condition. This is because smoking causes your blood vessels to narrow and reduces blood flow.  · Soak your hands or feet in warm (not hot) water. Do this at the first sign of attack. Keep soaking until your skin color returns to normal.  In some people, symptoms are persistent or troubling. For these cases, other treatments are a choice. Your healthcare provider can tell you more about the following:  · Prescription medicines that relax and widen blood vessels, such as calcium channel blockers. These may help relieve symptoms.  · Nerve surgery for severe cases that dont respond to other treatments. Surgery removes the nerves that surround the blood vessels in the hands and feet. Without nerve stimulation, the blood vessels stay more relaxed. They are less likely to become very narrow due to stimulus. Nerves may be blocked using injections in some cases.  Most cases of Raynaud disease are not cause for concern. The disease doesnt get worse and isnt likely to cause any permanent damage. If attacks are severe, very prolonged, or very often, skin damage may result. Controlling attacks can help prevent this.  When to seek medical care  The following problems happen rarely, but they can be serious. Call your healthcare provider right away if you notice any of the following:  · Infection or sores on the skin  · A finger or toe turns black  · The skin breaks open on its own  · A rash develops  · A finger or toe joint becomes painful or swollen   Date Last Reviewed: 1/27/2016  © 7074-5735 [employer]. [address] [place] [address], [place] [address]. All rights reserved. This information is not intended as a substitute for professional medical care. Always follow your healthcare professional's instructions.

## 2017-04-18 NOTE — PROGRESS NOTES
Subjective:    Patient ID:  Bev Miguel is a 66 y.o. female who presents for Hypertension  MVD, RAYNAUD'S    HPI  NO LABS, DOING WELL OVERALL, NOT TAKING NORVASC, WAS READING ABOUT SE'S, STILL WITH TOE SX. ALLERGIES, GETS SHOTS, , SEE ROS  Past Medical History:   Diagnosis Date    Abnormal EKG     Allergy     perennial; sees Dr Jose ENT for injections    Asthma     mild interm    Breast CA     Cancer     ovarian    Chest pain     Chronic insomnia     Colonic polyp     Heart murmur     Hyperlipidemia     Hypoglycemia     Macrocytosis     Mitral valve regurgitation     Osteopenia     Ovarian cancer     SOB (shortness of breath)     Uterine cancer     Valvular regurgitation     Varicose vein of leg     Venous insufficiency     Vitamin C deficiency      Past Surgical History:   Procedure Laterality Date    BREAST SURGERY  05/28/2010    bilateral mastectomy/breast reconstruction; R breast ca.; no XRT/chemo. [facility] for 5 yrs.    OOPHORECTOMY      TC  02/20/2017    no polyps; GI/Dr March. 5 yr repeat.    TONSILLECTOMY      URETEROTOMY       Family History   Problem Relation Age of Onset    Early death Mother     Miscarriages / Stillbirths Mother     Cancer Father     Early death Father     Asthma Brother     Early death Brother      Social History     Social History    Marital status:      Spouse name: N/A    Number of children: N/A    Years of education: N/A     Occupational History    retired [employer]      Social History Main Topics    Smoking status: Current Every Day Smoker     Packs/day: 0.30     Years: 45.00    Smokeless tobacco: Never Used    Alcohol use 1.2 oz/week     2 Shots of liquor per week      Comment: 2 cocktails daily; wine 5 glasses a week.    Drug use: No    Sexual activity: Not Asked     Other Topics Concern    None     Social History Narrative       Review of patient's allergies indicates:   Allergen Reactions    Benadryl [diphenhydramine  hcl]     Biaxin [clarithromycin]     Iodine and iodide containing products     Levaquin [levofloxacin]     Sulfa (sulfonamide antibiotics)     Trazodone     Zyban [bupropion hcl (smoking deter)]     Bactrim [sulfamethoxazole-trimethoprim] Rash       Current Outpatient Prescriptions:     alendronate (FOSAMAX) 35 MG tablet, TK ONE T PO  WEEKLY, Disp: 15 tablet, Rfl: 1    aspirin (ECOTRIN) 81 MG EC tablet, Take 81 mg by mouth once daily., Disp: , Rfl:     azelastine (ASTELIN) 137 mcg (0.1 %) nasal spray, U 2 SPRAYS IEN QD, Disp: , Rfl: 5    biotin 2,500 mcg Cap, Take 5,000 mg by mouth once daily., Disp: , Rfl:     CALCIUM CARB,CIT/D3/PHYTOSTROL (CITRACAL D + [employer] ORAL), Take 600 mg by mouth 2 (two) times daily., Disp: , Rfl:     cyanocobalamin (VITAMIN B-12) 1000 MCG tablet, Take 3,000 mcg by mouth once daily., Disp: , Rfl:     fexofenadine (ALLEGRA) 180 MG tablet, Take 180 mg by mouth once daily., Disp: , Rfl:     fluticasone (FLONASE) 50 mcg/actuation nasal spray, SHAKE LQ AND U 1 SPR IEN BID, Disp: , Rfl: 4    ibuprofen (ADVIL,MOTRIN) 200 MG tablet, Take 200 mg by mouth every 6 (six) hours as needed for Pain., Disp: , Rfl:     methylsulfonylmethane (MSM) 1,000 mg Cap, Take 2,000 mg by mouth 3 (three) times daily., Disp: , Rfl:     montelukast (SINGULAIR) 10 mg tablet, Take 10 mg by mouth every evening., Disp: , Rfl:     MULTIVIT-MIN/IRON/FOLIC/LUTEIN (CENTRUM SILVER WOMEN ORAL), Take 1 capsule by mouth once daily., Disp: , Rfl:     vitamin D 1000 units Tab, Take 4,000 mg by mouth once daily., Disp: , Rfl:     vitamin E 400 UNIT capsule, Take 400 Units by mouth once daily., Disp: , Rfl:     omega-3 acid ethyl esters (LOVAZA) 1 gram capsule, Take 2 g by mouth once daily., Disp: , Rfl:     Review of Systems   Constitution: Negative for chills, diaphoresis, weakness and night sweats. Malaise/fatigue: MILD.   HENT: Negative for congestion.    Eyes: Negative for blurred vision and discharge.  "  Cardiovascular: Negative for chest pain, claudication, cyanosis, dyspnea on exertion, irregular heartbeat, leg swelling, near-syncope, orthopnea, palpitations, paroxysmal nocturnal dyspnea and syncope.        RAYNUD'S SX IN TOES, SOME IN FINGERS,   Respiratory: Negative for cough, hemoptysis, shortness of breath, sleep disturbances due to breathing, sputum production and wheezing.    Endocrine: Negative for cold intolerance and heat intolerance.   Hematologic/Lymphatic: Negative for adenopathy. Does not bruise/bleed easily.   Skin: Negative for color change, itching and nail changes.   Musculoskeletal: Positive for back pain. Negative for falls.   Gastrointestinal: Negative for bloating, constipation, dysphagia, flatus, heartburn, hematemesis, jaundice and melena.   Genitourinary: Negative for frequency, incomplete emptying and nocturia.   Neurological: Negative for brief paralysis, difficulty with concentration, dizziness, focal weakness, light-headedness, loss of balance, numbness, paresthesias and tremors.   Psychiatric/Behavioral: Negative for altered mental status and substance abuse. The patient is not nervous/anxious.    Allergic/Immunologic: Negative for persistent infections.        Objective:      Vitals:    04/18/17 1002   BP: 136/77   Pulse: 82   Weight: 71.4 kg (157 lb 6.5 oz)   Height: 5' 8" (1.727 m)   PainSc: 0-No pain     Body mass index is 23.93 kg/(m^2).    Physical Exam   Constitutional: She is oriented to person, place, and time. She appears well-developed and well-nourished.   HENT:   Head: Normocephalic and atraumatic.   Mouth/Throat: Oropharynx is clear and moist and mucous membranes are normal.   Eyes: Conjunctivae and EOM are normal. Pupils are equal, round, and reactive to light.   Neck: Normal range of motion. Neck supple. Normal carotid pulses, no hepatojugular reflux and no JVD present. Carotid bruit is not present. No tracheal deviation present. No thyromegaly present. "   Cardiovascular: Normal rate, regular rhythm and normal heart sounds.  Exam reveals no gallop and no friction rub.    No murmur heard.  Pulses:       Carotid pulses are 2+ on the right side, and 2+ on the left side.       Radial pulses are 2+ on the right side, and 2+ on the left side.        Femoral pulses are 2+ on the right side, and 2+ on the left side.       Popliteal pulses are 2+ on the right side, and 2+ on the left side.        Dorsalis pedis pulses are 2+ on the right side, and 2+ on the left side.        Posterior tibial pulses are 2+ on the right side, and 2+ on the left side.   SLIGHTLY DECREASED CAPILLARY REFILL   Pulmonary/Chest: Effort normal and breath sounds normal. She has no wheezes. She has no rales. She exhibits no tenderness.   Abdominal: Soft. Bowel sounds are normal. She exhibits no distension and no mass. There is no hepatosplenomegaly. There is no tenderness. There is no guarding and no CVA tenderness.   Musculoskeletal: She exhibits no edema or tenderness.   Lymphadenopathy:     She has no cervical adenopathy.   Neurological: She is alert and oriented to person, place, and time. She has normal strength. She displays no tremor. No cranial nerve deficit. She exhibits normal muscle tone. Coordination normal.   Skin: Skin is warm and dry. No rash noted. No erythema.               ..    Chemistry        Component Value Date/Time     09/26/2016 0805    K 4.9 09/26/2016 0805     09/26/2016 0805    CO2 29 09/26/2016 0805    BUN 17 09/26/2016 0805    CREATININE 0.70 09/26/2016 0805    GLU 84 09/26/2016 0805        Component Value Date/Time    CALCIUM 9.4 03/14/2017 1545    ALKPHOS 87 09/26/2016 0805    AST 29 09/26/2016 0805    AST 27 03/14/2016 1523    ALT 25 09/26/2016 0805    BILITOT 0.7 09/26/2016 0805            ..  Lab Results   Component Value Date    CHOL 185 03/09/2016     Lab Results   Component Value Date    HDL 93 (H) 03/09/2016     Lab Results   Component Value Date     LDLCALC 81.4 03/09/2016     Lab Results   Component Value Date    TRIG 53 03/09/2016     Lab Results   Component Value Date    CHOLHDL 50.3 (H) 03/09/2016     ..  Lab Results   Component Value Date    WBC 7.74 03/14/2017    HGB 12.7 03/14/2017    HCT 38.4 03/14/2017     (H) 03/14/2017     03/14/2017       Test(s) Reviewed  I have reviewed the following in detail:  [] Stress test   [] Angiography   [] Echocardiogram   [] Labs   [] Other:       Assessment:         ICD-10-CM ICD-9-CM   1. Raynaud's disease without gangrene I73.00 443.0   2. Non-rheumatic mitral regurgitation I34.0 424.0   3. Tobacco use Z72.0 305.1     Problem List Items Addressed This Visit        Cardiology Problems    Raynaud's disease without gangrene - Primary    Non-rheumatic mitral regurgitation       Other    Tobacco use           Plan:     START LOW DOSE CCB , NORVASC, ALSO FOR AFTERLOAD REDUCTION,, TOBACCO CESSATION, , EDUCATION ALL CV CLINICALLY STABLE, NO ANGINA, NO HF, NO TIA, NO CLINICAL ARRHYTHMIA,CONTINUE CURRENT MEDS, EDUCATION, DIET, EXERCISE, RTC IN 6 MO WITH       Raynaud's disease without gangrene    Non-rheumatic mitral regurgitation    Tobacco use  RTC Low level/low impact aerobic exercise 5x's/wk. Heart healthy diet and risk factor modification.    See labs and med orders.    Aerobic exercise 5x's/wk. Heart healthy diet and risk factor modification.    See labs and med orders.

## 2017-07-26 ENCOUNTER — TELEPHONE (OUTPATIENT)
Dept: FAMILY MEDICINE | Facility: CLINIC | Age: 67
End: 2017-07-26

## 2017-07-26 NOTE — LETTER
July 28, 2017    Bev Miguel  [address] [facility] [address]             [facility]  [address]  [facility] [address]  Phone: [phone]  Fax: [phone] Dear Mrs. Miguel:    We have been unable to reach you to schedule your follow up with labs; please office to schedule.       If you have any questions or concerns, please don't hesitate to call.    Sincerely,        Aniya Bailey MA

## 2017-07-26 NOTE — TELEPHONE ENCOUNTER
Attempted to contact pt to schedule her 6 month follow up due in September 2017 and to also remind her to check labs done; no answer; left message on VM to return call to schedule.

## 2017-07-28 ENCOUNTER — TELEPHONE (OUTPATIENT)
Dept: FAMILY MEDICINE | Facility: CLINIC | Age: 67
End: 2017-07-28

## 2017-07-28 DIAGNOSIS — Z78.9 ALCOHOL USE: ICD-10-CM

## 2017-07-28 DIAGNOSIS — E55.9 VITAMIN D INSUFFICIENCY: ICD-10-CM

## 2017-07-28 DIAGNOSIS — D75.89 MACROCYTOSIS: ICD-10-CM

## 2017-07-28 DIAGNOSIS — M85.80 OSTEOPENIA, UNSPECIFIED LOCATION: Primary | ICD-10-CM

## 2017-07-28 DIAGNOSIS — Z00.00 HEALTHCARE MAINTENANCE: ICD-10-CM

## 2017-07-28 NOTE — TELEPHONE ENCOUNTER
Spoke with pt and informed her lab orders have been sent to [facility] and that they are fasting labs. Pt verbally understands.

## 2017-07-28 NOTE — TELEPHONE ENCOUNTER
Please continue to try and get in touch with the patient about schedule a follow-up appointment to try different times during the day

## 2017-07-28 NOTE — TELEPHONE ENCOUNTER
Attempted to contact pt; no answer; left message on VM to return call to schedule follow up;  3 attempts made how would you like to proceed?

## 2017-07-28 NOTE — TELEPHONE ENCOUNTER
----- Message from Sindy Ace sent at 7/28/2017 12:00 PM CDT -----  Patient needs blood work done before her appointment. Please put orders into the computer and call patient to let her know that the orders are there. Patient's phone number is [phone].    Thank you

## 2017-07-28 NOTE — TELEPHONE ENCOUNTER
Notify patient orders have been placed into the computer again at [facility].  Please obtain blood work a few days before the scheduled appointment and labs to be obtained after an overnight fast

## 2017-08-18 DIAGNOSIS — Z12.11 COLON CANCER SCREENING: ICD-10-CM

## 2017-08-24 ENCOUNTER — PATIENT OUTREACH (OUTPATIENT)
Dept: ADMINISTRATIVE | Facility: HOSPITAL | Age: 67
End: 2017-08-24

## 2017-08-24 NOTE — LETTER
August 24, 2017    Bev Miguel  [address] [facility] [address]             [facility]  [address]  [facility] [address]  Phone: [phone] Dear Mrs. Miguel:    [facility] is committed to your overall health.  To help you get the most out of each of your visits, we will review your information to make sure you are up to date on all of your recommended tests and/or procedures.      Dr. Vitaly Cain is a new provider to us and we may not have your complete medical records on file here at [facility].  The [facility] is requiring their patients to personally request their own medical records. The records we have received so far show that you may be due for your fasting cholesterol labs, hepatitis C screening, colon cancer screening, and possibly some immunizations (pneumonia, tetanus, and shingles).     If you have had any of the above done at another facility, please bring the records or information with you so that your record at [facility] will be complete.    If you are currently taking medication, please bring it with you to your appointment for review.    If you have any questions or concerns, please don't hesitate to call.    Thank you for letting us care for you,  Yany Hernadez LPN Clinical Care Coordinator  [facility] [place]  [phone]

## 2017-09-11 ENCOUNTER — OFFICE VISIT (OUTPATIENT)
Dept: FAMILY MEDICINE | Facility: CLINIC | Age: 67
End: 2017-09-11
Payer: COMMERCIAL

## 2017-09-11 VITALS
HEIGHT: 68 IN | WEIGHT: 161.19 LBS | TEMPERATURE: 98 F | OXYGEN SATURATION: 98 % | HEART RATE: 81 BPM | BODY MASS INDEX: 24.43 KG/M2 | DIASTOLIC BLOOD PRESSURE: 80 MMHG | SYSTOLIC BLOOD PRESSURE: 130 MMHG

## 2017-09-11 DIAGNOSIS — Z78.9 ALCOHOL USE: ICD-10-CM

## 2017-09-11 DIAGNOSIS — M85.80 OSTEOPENIA, UNSPECIFIED LOCATION: ICD-10-CM

## 2017-09-11 DIAGNOSIS — E53.8 B12 DEFICIENCY: ICD-10-CM

## 2017-09-11 DIAGNOSIS — Z72.0 NICOTINE ABUSE: Primary | ICD-10-CM

## 2017-09-11 DIAGNOSIS — J45.20 MILD INTERMITTENT ASTHMA WITHOUT COMPLICATION: ICD-10-CM

## 2017-09-11 DIAGNOSIS — D75.89 MACROCYTOSIS: ICD-10-CM

## 2017-09-11 DIAGNOSIS — E55.9 VITAMIN D INSUFFICIENCY: ICD-10-CM

## 2017-09-11 DIAGNOSIS — Z00.00 HEALTHCARE MAINTENANCE: ICD-10-CM

## 2017-09-11 DIAGNOSIS — E78.5 HYPERLIPIDEMIA, UNSPECIFIED HYPERLIPIDEMIA TYPE: ICD-10-CM

## 2017-09-11 DIAGNOSIS — J30.89 SEASONAL AND PERENNIAL ALLERGIC RHINITIS: ICD-10-CM

## 2017-09-11 DIAGNOSIS — J30.2 SEASONAL AND PERENNIAL ALLERGIC RHINITIS: ICD-10-CM

## 2017-09-11 DIAGNOSIS — K63.5 MULTIPLE BENIGN POLYPS OF LARGE INTESTINE: ICD-10-CM

## 2017-09-11 PROCEDURE — 99215 OFFICE O/P EST HI 40 MIN: CPT | Mod: S$GLB,,, | Performed by: INTERNAL MEDICINE

## 2017-09-11 PROCEDURE — 99999 PR PBB SHADOW E&M-EST. PATIENT-LVL IV: CPT | Mod: PBBFAC,,, | Performed by: INTERNAL MEDICINE

## 2017-09-11 PROCEDURE — 3008F BODY MASS INDEX DOCD: CPT | Mod: S$GLB,,, | Performed by: INTERNAL MEDICINE

## 2017-09-11 NOTE — PROGRESS NOTES
Subjective:       Patient ID: Bev Miguel is a 66 y.o. female.    Chief Complaint: Folow up labs    HPI Here for f/u and to go over her labs. Still smokes; at 5 per day; unless casino visit; has nicotine gum; cant tolerate chantix. Not ready to quit yet. Alcohol nightly at least 2 a day. Asthma doing a lot better. Taking allergy shots; Dr Jose ENT/allergist. Down to 2 shots q 3 weeks; just started; weekly for 3 yrs prior then every other week for about 6 mos.Off allegra for 2 mos. Perennial w seasonal worsening. Osteopenia; walking a lot; taking her calcium w vit D. DEXA due in January. TC 4 mos ago w GI Dr March neg for polyps; repeat 5 yrs. Labs reviewed at length. Time: 9723-3695. Various dx's discussed at length. Sees Dr Mclaughlin's NP for following of her hx breast ca. S/p teresa mastectomy; no XRT/chemo; 5 yrs femara though.    Review of Systems   Constitutional: Negative for appetite change, fever and unexpected weight change.   HENT: Negative for postnasal drip, rhinorrhea and sinus pain.          history of seasonal ALLERGIES / perennial ALLERGIES   Eyes: Negative for discharge and itching.   Respiratory: Negative for cough, chest tightness, shortness of breath and wheezing.    Cardiovascular: Negative for chest pain, palpitations and leg swelling.   Gastrointestinal: Negative for abdominal distention, abdominal pain, blood in stool, constipation, diarrhea, nausea and vomiting.        Denies melena as well   Endocrine: Negative for polydipsia, polyphagia and polyuria.   Genitourinary: Negative for dysuria, hematuria and urgency.   Musculoskeletal: Negative for arthralgias and myalgias.        Chr LBP; goes to spinal clinic; [facility].   Skin: Negative for rash.   Allergic/Immunologic: Positive for environmental allergies. Negative for food allergies and immunocompromised state.        Denies seasonal or perennial ALLERGIES.   Neurological: Negative for tremors, seizures and syncope.  "  Hematological: Negative for adenopathy. Does not bruise/bleed easily.   Psychiatric/Behavioral: Negative for behavioral problems and decreased concentration. The patient is not nervous/anxious.         Denies anxiety or depression       Objective:      Vitals:    09/11/17 0954   BP: 130/80   BP Location: Right arm   Patient Position: Sitting   BP Method: Medium (Manual)   Pulse: 81   Temp: 97.8 °F (36.6 °C)   TempSrc: Oral   SpO2: 98%   Weight: 73.1 kg (161 lb 2.5 oz)   Height: 5' 8" (1.727 m)     Body mass index is 24.5 kg/m².    Physical Exam   Constitutional: She is oriented to person, place, and time. She appears well-developed and well-nourished.   HENT:   Head: Normocephalic and atraumatic.   TM's pink; throat as well; NM swollen inflamed; clear mucus.   Eyes: EOM are normal.   Neck: Normal range of motion. Neck supple. No thyromegaly present.   Cardiovascular: Normal rate, regular rhythm and normal heart sounds.  Exam reveals no gallop.    No murmur heard.  Pulmonary/Chest: Effort normal and breath sounds normal. No respiratory distress. She has no wheezes. She has no rales.   Abdominal: Soft. Bowel sounds are normal. She exhibits no distension. There is no tenderness. There is no rebound and no guarding.   Musculoskeletal: Normal range of motion. She exhibits no edema.   Lymphadenopathy:     She has no cervical adenopathy.   Neurological: She is alert and oriented to person, place, and time.   Moves all 4 extremities fine.   Skin: No rash noted.   Psychiatric: She has a normal mood and affect. Her behavior is normal. Thought content normal.   Vitals reviewed.      Assessment:       1. Nicotine abuse    2. Mild intermittent asthma without complication    3. Seasonal and perennial allergic rhinitis    4. Osteopenia, unspecified location    5. Vitamin D insufficiency    6. Alcohol use    7. Macrocytosis    8. Hyperlipidemia, unspecified hyperlipidemia type    9. Healthcare maintenance        Plan:     "   Nicotine abuse; wean off cigarettes as tolerated; 1-800-QUIT-NOW for assistance/literature; try 1 cigarette a day each week til off.    Mild intermittent asthma without complication; has been stable. Not needing her inhaler.    Seasonal and perennial allergic rhinitis;; keep her f/u's w Dr Jose ENT; on allergy injections.    Osteopenia, unspecified location; Weight bearing exercises, continue calcium and vit D supplements.On alendronate weekly. DEXA due in January.    Vitamin D insufficiency; levels therapeutic.    Alcohol use; wean off as tolerated.    Macrocytosis; suspect due to alcohol, and B12 insufficiency.    Hyperlipidemia, unspecified hyperlipidemia type; needs to obtain fasting lipid now, and CMP update.    Healthcare maintenance; CMP needed. TC just done. Fab March in last 4 mos; no polyps; 5 yr f/u. Hx of benign polyps. Sees Dr Mclaughlin's NP for breast ca hx. S/p double mastectomy w reconstruction; cancer R breast; no chemo or XRT. Femara for 5 yrs.

## 2017-09-11 NOTE — PATIENT INSTRUCTIONS
Nicotine abuse; wean off cigarettes as tolerated; 1-800-QUIT-NOW for assistance/literature; try 1 cigarette a day each week til off.    Mild intermittent asthma without complication; has been stable. Not needing her inhaler.    Seasonal and perennial allergic rhinitis;; keep her f/u's w Dr Jose ENT; on allergy injections.    Osteopenia, unspecified location; Weight bearing exercises, continue calcium and vit D supplements.On alendronate weekly.DEXA due in January.    Vitamin D insufficiency; levels therapeutic.    Alcohol use; wean off as tolerated.    Macrocytosis; suspect due to alcohol, and B12 insufficiency.    Hyperlipidemia, unspecified hyperlipidemia type; needs to obtain fasting lipid now, and CMP update.    Healthcare maintenance; CMP needed. TC just done. Fab March in last 4 mos; no polyps; 5 yr f/u. Sees Dr Mclaughlin for breast ca hx. S/p double mastectomy w reconstruction; cancer R breast; no chemo or XRT. Femara for 5 yrs.       CMP/lipid now at [facility]; fast overnight; CBC w B12, and vit D 10 days before her 6 mos follow-up.

## 2017-09-22 DIAGNOSIS — Z11.59 NEED FOR HEPATITIS C SCREENING TEST: ICD-10-CM

## 2017-10-16 ENCOUNTER — OFFICE VISIT (OUTPATIENT)
Dept: CARDIOLOGY | Facility: CLINIC | Age: 67
End: 2017-10-16
Payer: COMMERCIAL

## 2017-10-16 VITALS
SYSTOLIC BLOOD PRESSURE: 120 MMHG | BODY MASS INDEX: 24.41 KG/M2 | WEIGHT: 161.06 LBS | HEIGHT: 68 IN | HEART RATE: 78 BPM | DIASTOLIC BLOOD PRESSURE: 79 MMHG

## 2017-10-16 DIAGNOSIS — I34.0 NON-RHEUMATIC MITRAL REGURGITATION: ICD-10-CM

## 2017-10-16 DIAGNOSIS — Z72.0 TOBACCO USE: ICD-10-CM

## 2017-10-16 DIAGNOSIS — I73.00 RAYNAUD'S DISEASE WITHOUT GANGRENE: Primary | ICD-10-CM

## 2017-10-16 PROCEDURE — 99999 PR PBB SHADOW E&M-EST. PATIENT-LVL IV: CPT | Mod: PBBFAC,,, | Performed by: INTERNAL MEDICINE

## 2017-10-16 PROCEDURE — 99213 OFFICE O/P EST LOW 20 MIN: CPT | Mod: S$GLB,,, | Performed by: INTERNAL MEDICINE

## 2017-10-16 RX ORDER — AMLODIPINE BESYLATE 2.5 MG/1
1.25 TABLET ORAL DAILY
Qty: 45 TABLET | Refills: 2 | Status: SHIPPED | OUTPATIENT
Start: 2017-10-16 | End: 2018-07-30 | Stop reason: SDUPTHER

## 2017-10-16 RX ORDER — HYDROXYZINE HYDROCHLORIDE 10 MG/1
10 TABLET, FILM COATED ORAL NIGHTLY
COMMUNITY
Start: 2017-09-14 | End: 2019-07-03

## 2017-10-16 NOTE — PATIENT INSTRUCTIONS
Exercise for a Healthier Heart  You may wonder how you can improve the health of your heart. If youre thinking about exercise, youre on the right track. You dont need to become an athlete, but you do need a certain amount of brisk exercise to help strengthen your heart. If you have been diagnosed with a heart condition, your doctor may recommend exercise to help stabilize your condition. To help make exercise a habit, choose safe, fun activities.     Exercise with a friend. When activity is fun, you're more likely to stick with it.     Be sure to check with your healthcare provider before starting an exercise program.   Why exercise?  Exercising regularly offers many healthy rewards. It can help you do all of the following:  · Improve your blood cholesterol level to help prevent further heart trouble  · Lower your blood pressure to help prevent a stroke or heart attack  · Control diabetes, or reduce your risk of getting this disease  · Improve your heart and lung function  · Reach and maintain a healthy weight  · Make your muscles stronger and more limber so you can stay active  · Prevent falls and fractures by slowing the loss of bone mass (osteoporosis)  · Manage stress better  · Reduce your blood pressure  · Improve your sense of self and your body image  Exercise tips  Ease into your routine. Set small goals. Then build on them.  Exercise on most days. Aim for a total of 150 or more minutes of moderate to  vigorous intensity activity each week. Consider 40 minutes, 3 to 4 times a week. For best results, activity should last for 40 minutes on average. It is OK to work up to the 40 minute period over time. Examples of moderate-intensity activity is walking 1 mile in 15 minutes or 30 to 45 minutes of yard work.  Step up your daily activity level. Along with your exercise program, try being more active throughout the day. Walk instead of drive. Do more household tasks or yard work.  Choose one or more  activities you enjoy. Walking is one of the easiest things you can do. You can also try swimming, riding a bike, dancing, or taking an exercise class.  Stop exercising and call your doctor if you:  · Have chest pain or feel dizzy or lightheaded  · Feel burning, tightness, pressure, or heaviness in your chest, neck, shoulders, back, or arms  · Have unusual shortness of breath  · Have increased joint or muscle pain  · Have palpitations or an irregular heartbeat   Date Last Reviewed: 5/1/2016 © 2000-2017 [employer]. [address] [place] [id number]. All rights reserved. This information is not intended as a substitute for professional medical care. Always follow your healthcare professional's instructions.        Heart Disease Education    The heart beats 60 to 100 times per minute, 24 hours a day. This equals almost 1000,000 times a day. It pumps blood with oxygen and nutrients to the tissues and organs of the body. But the heart is a muscle and needs its own supply of blood. Blood flow to the heart is supplied by the coronary arteries. Coronary artery disease (atherosclerosis) is a result of cholesterol, saturated fat, and calcium deposits (plaques) that build up inside the walls. This causes inflammation within the coronary arteries. These plaques narrow the artery and reduce blood flow to the heart muscle. The reduction in blood flow to the heart muscle decreases oxygen supply to the heart. If the narrowing is significant enough, the oxygen supply to one or more regions of the heart can be temporarily or permanently shut down. This can cause chest pain, and possibly death of heart tissue (heart attack).  Types of chest pain  Angina is the name for pain in the heart muscle. Angina is a warning sign of serious heart disease. When untreated it can lead to a heart attack, also known as acute myocardial infarction, or AMI. Angina occurs when there is not enough blood and oxygen flowing to the  heart for the amount of work it is doing. This most often happens during physical exertion, when the heart is working hardest. It is usually relieved by rest or nitroglycerin. Angina may also occur after a large meal when extra blood is sent to the digestive organs and less goes to the heart. In the case of advanced or unstable heart disease, angina can occur at rest or awaken you from sleep. Angina usually lasts from a few minutes up to 20 minutes or more. When treated early, the effects of angina can be reversed without permanent damage to the heart. Angina is a serious condition and needs to be evaluated by a medical professional immediately.  There are two types of angina -- stable and unstable:  · Stable angina usually occurs with a predictable level of activity. Being stable, its character, severity, and occurrence do not change much over time. It usually starts with activity, and resolves with rest or taking your medicine as instructed by your doctor. The symptoms usually do not last long.  · Unstable angina changes or gets worse over time. It is different from whatever you are used to. It may feel different or worse, begin without cause, occur with exercise or exertion, wake you up from sleep, and last longer. It may not respond in the same way as it does when you take your usual medicines for an attack. This type of angina can be a warning sign of an impending heart attack.     A heart attack is usually the result of a blood clot that suddenly forms in a coronary artery that has been narrowed with plaque. When this occurs, blood flow may be cut off to a part of the heart muscle, causing the cells to die. This weakens the pumping action of the heart, which affects the delivery of blood to all the other organs in the body including the brain. This damage is not reversible. However, early treatment can limit the amount of damage.  The pain you feel with angina and a heart attack may have a similar quality.  "However, it is usually different in intensity and duration. Here are some typical descriptions of a heart attack:  · It is most often experienced as a squeezing, crushing, pressure-like sensation in the center of the chest.  · It is sometimes described as something heavy sitting on my chest.  · It may feel more like a bad case of indigestion.  · The pain may spread from the chest to the arm, shoulder, throat or jaw.  · Sometimes the pain is not felt in the chest at all, but only in the arm, shoulder, throat or jaw.  · There may also be nausea, vomiting, dizziness or light-headedness, sweating and trouble breathing.  · Palpitations, or your heart beating rapidly  · A new, irregular heart beat  · Unexplained weakness  You may not be able to tell the difference between "bad" angina and a heart attack at home. Seek help if your symptoms are different than usual. Do not be in denial or just try to "tough it out."  Call 911  This is the fastest and safest way to get to the emergency department. The paramedics can also start treatment on the way to the hospital, saving valuable time for your heart.  · If the angina gets worse, if it continues, or if it stops and returns, call 911 immediately. Do not delay. You may be having a heart attack.  · After you call 911, take a second tablet or spray unless instructed otherwise. When repeating doses, sit down if possible, because it can make you feel lightheaded or dizzy. Wait another 5 minutes. If the angina still does not go away, take a third tablet or spray. Do not take more than 3 tablets or sprays within 15 minutes. Stay on the phone with 911 for further instruction.  · Your healthcare provider may give you slightly different instructions than those above. If so, follow them carefully.  Do not wait until symptoms become severe to call 911.  Other reasons to call 911 include:  · Trouble breathing  · Feeling lightheaded, faint, or dizzy  · Rapid heart beat  · Slower than " "usual heart rate compared to your normal  · Angina with weakness, dizziness, fainting, heavy sweating, nausea, or vomiting  · Extreme drowsiness, confusion  · Weakness of an arm or leg or one side of the face  · Difficulty with speech or vision  When to seek medical care  Remember, the signs and symptoms of a heart attack are not always like they are on TV. Sometimes they are not so obvious. You may only feel weak, or just not right. If it is not clear or if you have any doubt, call for advice.  · Seek help if there is a change in the type of pain, if it feels different, or if your symptoms are mild.  · Do not drive yourself. Have someone else drive you. If no one can drive, call 911.  · Do not delay. Fast diagnosis and treatment can prevent or limit the amount of heart damage during a heart attack.  · Do not go to your doctor's office or a clinic as they may not be able to provide all the testing and treatment required for this condition.  · If your doctor has given you medicine to take when symptoms occur, take them but don't delay getting help trying to locate medicines.  What happens in the emergency department  The emergency department is connected to your local emergency medical system (EMS) through 911. That's why during a cardiac emergency, calling 911 is the fastest way to get help. The goal of the emergency department is to rapidly screen, evaluate, and treat people.  Once you are there, an electrocardiogram (ECG or heart tracing) will be done. Blood samples may be taken to look for the presence of heart enzymes that leak from damaged heart cells and show if a heart attack is occurring. You will often be evaluated by a heart specialist (cardiologist) who decides the best course of action. In the case of severe angina or early heart attack, and depending on the circumstances, powerful "clot busting" medicines can be used to dissolve blood clots in the coronary artery. In other cases, you may be taken to a " cardiac catheterization lab. Here, a tiny balloon-tipped catheter is advanced through blood vessels to the heart. There the balloon is inflated pushing open the blood vessel restoring blood flow.  Risk factors for heart disease  Risk factors for heart disease are a combination of genetic and lifestyle. Many risk factors work by either directly or indirectly damaging the blood vessels of the heart, or by increasing the risk of forming blood or cholesterol clots, which then clog up and block the arteries.     Examples of physical lifestyle risk factors:  · Cigarette smoking  · High blood pressure  · High blood cholesterol  · Use of stimulant drugs such as cocaine, crack, and amphetamines  · Eating a high-fat, high-cholesterol meal  · Diabetes   · Obesity which increases risk for diabetes and high blood pressure  · Lack of regular physical activity     Examples of emotional lifestyle factors:  · Chronic high stress levels release stress hormones. These raise blood pressure and cholesterol level and makes blood clot more easily.  · Held-in anger, hostile or cynical attitude  · Social and emotional isolation, lack of intimacy  · Loss of relationship  · Depression  Other factors that increase the risk of heart attack that you cannot control :  · Age. The older you get beyond 40, the greater is your risk of significant coronary artery disease.  · Gender. More men than women get heart disease; but once past menopause, women who are not taking estrogen replacement have the same risk as men for a heart attack.  · Family history. If your mother, father, brother or sister has coronary artery disease, your risk of having it is higher than a person your age without this family history.  What can you do to decrease your risk  To reduce your risk of heart disease:  · Get regular checkups with your doctor.  · Take your medicines for blood pressure, cholesterol or diabetes as directed.  · Watch your diet. Eat a heart healthy diet  choosing fresh foods, less salt, cholesterol, and fat  · Stop smoking. Get help if needed.  · Get regular exercise.  · Manage stress.  · Carry a list of medicines and doses in your wallet.  Date Last Reviewed: 12/30/2015 © 2000-2017 [employer]. [address] [place] [address]. All rights reserved. This information is not intended as a substitute for professional medical care. Always follow your healthcare professional's instructions.        Understanding Mitral Valve Regurgitation    Mitral valve regurgitation is when the mitral valve in the heart is leaky. It lets some blood flow backwards when the ventricle squeezes.  How mitral valve regurgitation happens  The mitral valve is one of the hearts 4 valves. Normally, these valves help the blood flow through the hearts 4 chambers and out to the body without leaking backwards. The mitral valve lies between the left atrium and the left ventricle. Normally, the mitral valve stops blood from flowing back into the left atrium from the left ventricle when the ventricle squeezes. This maximizes forward blood flow through the heart.  With mitral valve regurgitation, some blood leaks back through the valve. This makes the heart have to work harder to get blood out to your body. When this blood is regurgitated backwards in the heart, it increases the pressure within the heart which can lead to muscle damage as well as high blood pressure in the lungs.  Mitral valve regurgitation can increase risk for other heart rhythm problems, such as atrial fibrillation (AFib). This abnormal heart rhythm results in fast and irregular heartbeats which can also lower the heart's pumping ability and increases the risk for stroke.  Mitral valve regurgitation can be acute or chronic. If its acute, the valve suddenly becomes leaky. In this case, the heart doesnt have time to adapt to the leak in the valve. Symptoms are often severe. If its chronic, the valve leaks  more and more over time. The heart has time to adapt to the leak.  What causes mitral valve regurgitation?  Mitral valve regurgitation can be caused by various things, such as:  · Heart attack or coronary artery disease  · Natural aging that can damage the mitral valve  · Tearing of the tissue or muscle that supports the mitral valve such as due to an injury  · A redundant or floppy mitral valve, called mitral valve prolapse  · Rheumatic heart disease caused by untreated Streptococcus infection. Strep are the bacteria that cause strep throat.  · Certain autoimmune diseases, such as rheumatoid arthritis  · Infection of the heart valves (endocarditis)  · Problems with the mitral valve that are present at birth  · Certain medicines  You can reduce some risk factors for mitral valve regurgitation. For example:  · Use antibiotics as directed to treat a strep infection and prevent rheumatic heart disease.  · Reduce the risk for heart valve infection by not injecting illegal drugs.  · Manage risk factors that can lead to a heart attack or chronic heart artery disease.  There are other risk factors that you cant change. For example, some conditions that can lead to mitral valve regurgitation are partly genetic.  Symptoms of mitral valve regurgitation  Chronic mitral valve regurgitation often doesnt cause symptoms for a long time. Mild or moderate mitral regurgitation often doesnt cause any symptoms. If the condition becomes more severe, you may have symptoms. They may get worse and happen more often over time. Symptoms may include:  · Shortness of breath with physical activity  · Shortness of breath when lying flat  · Feeling tired  · Less ability to exercise  · Awareness of your heartbeat  · Swelling in your legs, abdomen, and the veins in your neck  · Chest pain (less common)  Acute, severe mitral valve regurgitation is a medical emergency that can cause serious symptoms such as:  · Symptoms of shock (pale skin, loss  of consciousness, rapid breathing)  · Severe shortness of breath  · Arrhythmias that make the heart unable to pump blood well  Diagnosing mitral valve regurgitation  Your healthcare provider will ask about your health history. He or she will give you a physical exam. Using a stethoscope, your healthcare provider will listen to your heart. This is to check for sounds called heart murmurs and other signs that the heart isnt pumping normally. You may also have tests such as:  · Echocardiogram, to look at the structure of the heart using sound waves  · Stress echocardiogram, to see how well the heart does during exercise  · Electrocardiogram (EKG), to check the hearts rhythm  · Cardiac MRI, transesophageal echocardiogram, or cardiac catheterization, if more information is needed  Date Last Reviewed: 6/1/2016  © 1592-5528 [employer]. [address] [place] [address], [place] [address]. All rights reserved. This information is not intended as a substitute for professional medical care. Always follow your healthcare professional's instructions.        Smoking and Peripheral Arterial Disease (PAD)  Smoking is the greatest single danger to the health of your arteries. It puts you at higher risk for peripheral arterial disease (PAD). PAD is a disease of the arteries in the legs. If you have PAD, its likely that other parts of your body are diseased, too. That puts you at high risk for heart attack or stroke. Read on to learn how smoking can lead to PAD and affect your health.  How can smoking lead to PAD?  Smoking causes swelling and redness (inflammation) that leads to plaque forming. Plaque is a waxy material made up of cholesterol and other particles. It can build up in your artery walls. When there is too much plaque, your arteries can become narrowed and restrict blood flow. This then raises your risk for PAD and blood clots. It also worsens other risk factors, such as high blood pressure and high cholesterol.  These are things that make you more likely to have artery disease.  What happens if you dont quit smoking?  · You have 2 to 4 times the risk of dying from a heart attack or stroke as a nonsmoker.  · You have a greater risk of getting severe PAD, pain in your legs when walking (claudication), dead body tissue due to lack of blood flow (gangrene), or having a leg or foot amputated.  · You are at greater risk for abdominal aortic aneurysm (AAA). This is a bulge in the aorta, a major artery. It can burst suddenly and be fatal.  What happens if you quit smoking?  · Your risk for heart attack and stroke drops as soon as you quit smoking.  · After 1 year of not smoking, your risk for heart attack falls by 50%.  · In 5 to 15 years after you quit, your risk for heart attack or stroke is the same as someone who never smoked.  · Your risk for amputation and other complications of PAD is reduced.  · Your risk of developing AAA decreases.     For more information  · [employer]/mssp-ag-tx-expert  · [facility] Smoking Quitline:0-233-32Q-QUIT (1-706-499-2367)      Date Last Reviewed: 6/1/2016  © 7610-1439 [employer]. [address] [place] [address], [place] [address]. All rights reserved. This information is not intended as a substitute for professional medical care. Always follow your healthcare professional's instructions.        Raynaud Disease  Your healthcare provider has told you that you have Raynaud disease. It is also called Raynaud phenomenon or Raynaud syndrome. There is no cure for Raynaud disease, but you can manage it to help prevent attacks.    What are the symptoms of Raynaud disease?  A Raynaud disease attack is often triggered by cold or stress. During an attack, blood vessels suddenly narrow (called vasospasm).  This most often happens in fingers and toes. In rare cases, the nose, ears, or even tongue are affected. Narrowed blood vessels reduce the blood supply to the area. The area then  turns white, then blue. The area may feel numb or painful. As the attack passes, the blood vessels open. The affected area may turn bright red as it warms up, then returns to normal color.  What is the cause of Raynaud disease?  With Raynaud disease, it is believed that blood vessels in the affected areas overrespond to certain triggers, such as cold. This makes them narrow (called vasospasm) much more than in people without the disease. What causes the blood vessels to react so strongly to certain triggers is unknown. In between attacks, the blood vessels are normal and healthy. Attacks dont permanently damage the blood vessels, but may thicken the artery walls.   In some cases, Raynaud disease happens along with another disease or condition. This is often a connective tissue disorder, such as lupus, scleroderma, or rheumatoid arthritis. This is called secondary Raynaud disease (as opposed to primary Raynaud disease discussed above) and may be more severe. If this is the case for you, you and your healthcare provider can discuss treatment for the underlying condition.  What are the risk factors?  Risk factors for Raynaud disease include:  · Women are more likely to get Raynaud disease than men.  · Younger individuals are at higher risk, usually ages 15 to 30.  · Living in colder climates increases risk.  · Having a family member with Raynaud disease increases one's risk.  · Underlying rheumatoid conditions may increase one's risk.   What are possible triggers?  Triggers for Raynaud disease include:   · Cold  · Stress  · Caffeine  · Smoking  · Repetitive movements  · Certain medicines, such as beta-blockers, migraine medicine, birth control pills and others  · Injury  How is Raynaud disease diagnosed?  Your description of your symptoms, a health history, and a physical exam are often enough for a diagnosis. Blood tests and other tests may be done to see if any underlying conditions are present and rule out other  problems.  How is Raynaud disease treated?  There is no cure for Raynaud disease. But you can control symptoms and reduce the number and severity of attacks. For most people, avoiding triggers is enough to limit attacks. Your healthcare provider may suggest the following:  · Take precautions to help prevent your hands and feet from losing circulation. This includes:  ¨ Dressing warmly in cold weather.  ¨ Wear gloves or mittens when your hands may become cold, such as when you use the refrigerator or freezer.  ¨ Avoid stress and caffeine.  ¨ Exercise regularly. This may reduce the number and severity of attacks.   ¨ If you smoke, quitting may improve the condition. This is because smoking causes your blood vessels to narrow and reduces blood flow.  · Soak your hands or feet in warm (not hot) water. Do this at the first sign of attack. Keep soaking until your skin color returns to normal.  In some people, symptoms are persistent or troubling. For these cases, other treatments are a choice. Your healthcare provider can tell you more about the following:  · Prescription medicines that relax and widen blood vessels, such as calcium channel blockers. These may help relieve symptoms.  · Nerve surgery for severe cases that dont respond to other treatments. Surgery removes the nerves that surround the blood vessels in the hands and feet. Without nerve stimulation, the blood vessels stay more relaxed. They are less likely to become very narrow due to stimulus. Nerves may be blocked using injections in some cases.  Most cases of Raynaud disease are not cause for concern. The disease doesnt get worse and isnt likely to cause any permanent damage. If attacks are severe, very prolonged, or very often, skin damage may result. Controlling attacks can help prevent this.  When to seek medical care  The following problems happen rarely, but they can be serious. Call your healthcare provider right away if you notice any of the  following:  · Infection or sores on the skin  · A finger or toe turns black  · The skin breaks open on its own  · A rash develops  · A finger or toe joint becomes painful or swollen   Date Last Reviewed: 1/27/2016  © 7587-7134 [employer]. [address] [place] [address], [place] [address]. All rights reserved. This information is not intended as a substitute for professional medical care. Always follow your healthcare professional's instructions.        How to Quit Smoking  Smoking is one of the hardest habits to break. About half of all people who have ever smoked have been able to quit. Most people who still smoke want to quit. Here are some of the best ways to stop smoking.    Keep trying  Most smokers make many attempts at quitting before they are successful. Its important not to give up.  Go cold turkey  Most former smokers quit cold turkey (all at once). Trying to cut back gradually doesn't seem to work as well, perhaps because it continues the smoking habit. Also, it is possible to inhale more while smoking fewer cigarettes. This results in the same amount of nicotine in your body.  Get support  Support programs can be a big help, especially for heavy smokers. These groups offer lectures, ways to change behavior, and peer support. Here are some ways to find a support program:  · Free national quitline: 800QUIT-NOW (256-843-1082).  · Hospital quit-smoking programs.  · American Lung Association: (846.888.6006).  · American Cancer Society (728-799-0475).  Support at home is important too. Nonsmokers can offer praise and encouragement. If the smoker in your life finds it hard to quit, encourage them to keep trying.  Over-the-counter medicines  Nicotine replacement therapy may make quitting easier. Certain aids, such as the nicotine patch, gum, and lozenges, are available without a prescription. It is best to use these under a doctors care, though. The skin patch provides a steady supply of nicotine.  "Nicotine gum and lozenges give temporary bursts of low levels of nicotine. Both methods reduce the craving for cigarettes. Warning: If you have nausea, vomiting, dizziness, weakness, or a fast heartbeat, stop using these products and see your doctor.  Prescription medicines  After reviewing your smoking patterns and past attempts to quit, your doctor may offer a prescription medicine such as bupropion, varenicline, a nicotine inhaler, or nasal spray. Each has advantages and side effects. Your doctor can review these with you.  Health benefits of quitting  The benefits of quitting start right away and keep improving the longer you go without smoking. These benefits occur at any age.  So whether you are 17 or 70, quitting is a good decision. Some of the benefits include:  · 20 minutes: Blood pressure and pulse return to normal.  · 8 hours: Oxygen levels return to normal.  · 2 days: Ability to smell and taste begin to improve as damaged nerves regrow.  · 2 to 3 weeks: Circulation and lung function improve.  · 1 to 9 months: Coughing, congestion, and shortness of breath decrease; tiredness decreases.  · 1 year: Risk of heart attack decreases by half.  · 5 years: Risk of lung cancer decreases by half; risk of stroke becomes the same as a nonsmokers.  For more on how to quit smoking, try these online resources:   · Smokefree.gov  · "Clearing the Air" booklet from the National Cancer [place]: smokefree.gov/sites/default/files/pdf/clearing-the-air-accessible.pdf  Date Last Reviewed: 3/1/2017  © 3745-2675 [employer], [employer]. [address] [place] [address], [place] [address]. All rights reserved. This information is not intended as a substitute for professional medical care. Always follow your healthcare professional's instructions.        "

## 2017-10-16 NOTE — PROGRESS NOTES
Subjective:    Patient ID:  Bev Miguel is a 67 y.o. female who presents for Follow-up and Valvular Heart Disease  Raynaud's disease    HPI  Feels better with meds, STILL SMOKES,RECENT LDL 62, HDL 91, SEE ROS  Past Medical History:   Diagnosis Date    Abnormal EKG     Allergy     perennial; sees Dr Jose ENT for injections    Asthma     mild interm    Breast CA     Cancer     ovarian    Chest pain     Chronic insomnia     Colonic polyp     Heart murmur     Hyperlipidemia     Hypoglycemia     Macrocytosis     Mitral valve regurgitation     Osteopenia     Ovarian cancer     SOB (shortness of breath)     Uterine cancer     Valvular regurgitation     Varicose vein of leg     Venous insufficiency     Vitamin C deficiency      Past Surgical History:   Procedure Laterality Date    BREAST SURGERY  05/28/2010    bilateral mastectomy/breast reconstruction; R breast ca.; no XRT/chemo. [facility] for 5 yrs.    OOPHORECTOMY      TC  02/20/2017    no polyps; GI/Dr March. 5 yr repeat.    TONSILLECTOMY      URETEROTOMY       Family History   Problem Relation Age of Onset    Early death Mother     Miscarriages / Stillbirths Mother     Cancer Father     Early death Father     Asthma Brother     Early death Brother      Social History     Social History    Marital status:      Spouse name: N/A    Number of children: N/A    Years of education: N/A     Occupational History    retired [employer]      Social History Main Topics    Smoking status: Current Every Day Smoker     Packs/day: 0.30     Years: 45.00    Smokeless tobacco: Never Used    Alcohol use 1.2 oz/week     2 Shots of liquor per week      Comment: 2 cocktails daily; wine 5 glasses a week.    Drug use: No    Sexual activity: Not on file     Other Topics Concern    Not on file     Social History Narrative    No narrative on file       Review of patient's allergies indicates:   Allergen Reactions    Benadryl  [diphenhydramine hcl]     Biaxin [clarithromycin]     Iodine and iodide containing products     Levaquin [levofloxacin]     Sulfa (sulfonamide antibiotics)     Trazodone     Zyban [bupropion hcl (smoking deter)]     Bactrim [sulfamethoxazole-trimethoprim] Rash       Current Outpatient Prescriptions:     alendronate (FOSAMAX) 35 MG tablet, TK ONE T PO  WEEKLY, Disp: 15 tablet, Rfl: 1    amlodipine (NORVASC) 2.5 MG tablet, Take 0.5 tablets (1.25 mg total) by mouth once daily., Disp: 15 tablet, Rfl: 5    ascorbate calcium-bioflavonoid (LEIGHTON-C WITH BIOFLAVONOIDS) 500-200 mg Tab, Take 1 tablet by mouth once daily., Disp: , Rfl:     aspirin (ECOTRIN) 81 MG EC tablet, Take 81 mg by mouth once daily., Disp: , Rfl:     azelastine (ASTELIN) 137 mcg (0.1 %) nasal spray, U 2 SPRAYS IEN QD, Disp: , Rfl: 5    biotin 2,500 mcg Cap, Take 5,000 mg by mouth once daily., Disp: , Rfl:     CALCIUM CARB,CIT/D3/PHYTOSTROL (CITRACAL D + HEART HEALTH ORAL), Take 600 mg by mouth 2 (two) times daily., Disp: , Rfl:     cyanocobalamin (VITAMIN B-12) 1000 MCG tablet, Take 3,000 mcg by mouth once daily., Disp: , Rfl:     fexofenadine (ALLEGRA) 180 MG tablet, Take 180 mg by mouth once daily., Disp: , Rfl:     fluticasone (FLONASE) 50 mcg/actuation nasal spray, SHAKE LQ AND U 1 SPR IEN BID, Disp: , Rfl: 4    hydrOXYzine HCl (ATARAX) 10 MG Tab, 10 mg every evening. , Disp: , Rfl:     methylsulfonylmethane (MSM) 1,000 mg Cap, Take 2,000 mg by mouth 3 (three) times daily., Disp: , Rfl:     montelukast (SINGULAIR) 10 mg tablet, Take 10 mg by mouth every evening., Disp: , Rfl:     MULTIVIT-MIN/IRON/FOLIC/LUTEIN (CENTRUM SILVER WOMEN ORAL), Take 1 capsule by mouth once daily., Disp: , Rfl:     vitamin D 1000 units Tab, Take 4,000 mg by mouth once daily., Disp: , Rfl:     vitamin E 400 UNIT capsule, Take 400 Units by mouth once daily., Disp: , Rfl:     Review of Systems   Constitution: Negative for chills, diaphoresis, weakness,  "malaise/fatigue and night sweats.   HENT: Negative for congestion.    Eyes: Negative for blurred vision and discharge.   Cardiovascular: Negative for chest pain, claudication, cyanosis, dyspnea on exertion, irregular heartbeat, leg swelling, near-syncope, orthopnea, palpitations, paroxysmal nocturnal dyspnea and syncope.        RAYNUD'S SX IN TOES, SOME IN FINGERS MUCH BETTER WITH MEDS   Respiratory: Negative for cough, hemoptysis, shortness of breath, sleep disturbances due to breathing, sputum production and wheezing.    Endocrine: Negative for cold intolerance and heat intolerance.   Hematologic/Lymphatic: Negative for adenopathy. Does not bruise/bleed easily.   Skin: Negative for color change, itching and nail changes.   Musculoskeletal: Negative for back pain (CHRONIC) and falls.   Gastrointestinal: Negative for abdominal pain, dysphagia, heartburn, hematemesis, jaundice and melena.   Genitourinary: Negative for dysuria, flank pain and frequency.   Neurological: Negative for brief paralysis, difficulty with concentration, dizziness, focal weakness, light-headedness, loss of balance, numbness, paresthesias and tremors.   Psychiatric/Behavioral: Negative for altered mental status and substance abuse. The patient is not nervous/anxious.    Allergic/Immunologic: Negative for persistent infections.        Objective:      Vitals:    10/16/17 0903   BP: 120/79   Pulse: 78   Weight: 73.1 kg (161 lb 1.1 oz)   Height: 5' 7.5" (1.715 m)   PainSc: 0-No pain     Body mass index is 24.85 kg/m².    Physical Exam   Constitutional: She is oriented to person, place, and time. She appears well-developed and well-nourished.   HENT:   Head: Normocephalic and atraumatic.   Mouth/Throat: Oropharynx is clear and moist and mucous membranes are normal.   Eyes: Conjunctivae and EOM are normal. Pupils are equal, round, and reactive to light.   Neck: Normal range of motion. Neck supple. Normal carotid pulses, no hepatojugular reflux and no " JVD present. Carotid bruit is not present. No tracheal deviation present. No thyromegaly present.   Cardiovascular: Normal rate and regular rhythm.  Exam reveals no gallop and no friction rub.    Murmur heard.   Systolic murmur is present with a grade of 1/6  at the lower left sternal border, apex  Pulses:       Carotid pulses are 2+ on the right side, and 2+ on the left side.       Radial pulses are 2+ on the right side, and 2+ on the left side.        Femoral pulses are 2+ on the right side, and 2+ on the left side.       Dorsalis pedis pulses are 2+ on the right side, and 2+ on the left side.        Posterior tibial pulses are 2+ on the right side, and 2+ on the left side.   OK DECREASED CAPILLARY REFILL, SLIGHTLY COOL TOES   Pulmonary/Chest: Effort normal and breath sounds normal. She has no wheezes. She has no rales. She exhibits no tenderness.   Abdominal: Soft. Bowel sounds are normal. She exhibits no distension and no mass. There is no hepatosplenomegaly. There is no tenderness. There is no guarding and no CVA tenderness.   Musculoskeletal: She exhibits no edema or tenderness.   Lymphadenopathy:     She has no cervical adenopathy.   Neurological: She is alert and oriented to person, place, and time. She has normal strength. She displays no tremor. No cranial nerve deficit. She exhibits normal muscle tone. Coordination normal.   Skin: Skin is warm and dry. No rash noted. No erythema.               ..    Chemistry        Component Value Date/Time     09/18/2017 0803    K 4.7 09/18/2017 0803     09/18/2017 0803    CO2 25 09/18/2017 0803    BUN 19 (H) 09/18/2017 0803    CREATININE 0.67 09/18/2017 0803    GLU 74 09/18/2017 0803        Component Value Date/Time    CALCIUM 9.2 09/18/2017 0803    ALKPHOS 136 09/18/2017 0803    AST 32 09/18/2017 0803    AST 27 03/14/2016 1523    ALT 31 09/18/2017 0803    BILITOT 0.4 09/18/2017 0803    ESTGFRAFRICA >60 09/18/2017 0803    EGFRNONAA >60 09/18/2017 0803             ..  Lab Results   Component Value Date    CHOL 165 09/18/2017    CHOL 185 03/09/2016     Lab Results   Component Value Date    HDL 92 (H) 09/18/2017    HDL 93 (H) 03/09/2016     Lab Results   Component Value Date    LDLCALC 61.8 (L) 09/18/2017    LDLCALC 81.4 03/09/2016     Lab Results   Component Value Date    TRIG 56 09/18/2017    TRIG 53 03/09/2016     Lab Results   Component Value Date    CHOLHDL 55.8 (H) 09/18/2017    CHOLHDL 50.3 (H) 03/09/2016     ..  Lab Results   Component Value Date    WBC 6.71 09/05/2017    HGB 13.3 09/05/2017    HCT 41.0 09/05/2017     (H) 09/05/2017     09/05/2017       Test(s) Reviewed  I have reviewed the following in detail:  [] Stress test   [] Angiography   [] Echocardiogram   [x] Labs   [] Other:       Assessment:       No diagnosis found.  Problem List Items Addressed This Visit     None      Visit Diagnoses    None.          Plan:           There are no diagnoses linked to this encounter.RTC Low level/low impact aerobic exercise 5x's/wk. Heart healthy diet and risk factor modification.    See labs and med orders.    Aerobic exercise 5x's/wk. Heart healthy diet and risk factor modification.    See labs and med orders.

## 2017-12-11 PROBLEM — Z00.00 HEALTHCARE MAINTENANCE: Status: RESOLVED | Noted: 2017-09-11 | Resolved: 2017-12-11

## 2017-12-31 DIAGNOSIS — M85.80 OSTEOPENIA: ICD-10-CM

## 2018-01-02 RX ORDER — ALENDRONATE SODIUM 35 MG/1
TABLET ORAL
Qty: 12 TABLET | Refills: 0 | Status: SHIPPED | OUTPATIENT
Start: 2018-01-02 | End: 2018-04-11

## 2018-03-15 DIAGNOSIS — M25.551 RIGHT HIP PAIN: Primary | ICD-10-CM

## 2018-03-19 ENCOUNTER — HOSPITAL ENCOUNTER (OUTPATIENT)
Dept: RADIOLOGY | Facility: HOSPITAL | Age: 68
Discharge: HOME | End: 2018-03-19
Attending: ORTHOPAEDIC SURGERY
Payer: MEDICARE

## 2018-03-19 ENCOUNTER — OFFICE VISIT (OUTPATIENT)
Dept: ORTHOPEDICS | Facility: CLINIC | Age: 68
End: 2018-03-19
Payer: MEDICARE

## 2018-03-19 VITALS — BODY MASS INDEX: 24.4 KG/M2 | WEIGHT: 161 LBS | HEIGHT: 68 IN

## 2018-03-19 DIAGNOSIS — M16.11 LOCALIZED OSTEOARTHROSIS OF RIGHT HIP: Primary | ICD-10-CM

## 2018-03-19 DIAGNOSIS — M25.551 RIGHT HIP PAIN: ICD-10-CM

## 2018-03-19 DIAGNOSIS — M47.816 LUMBAR SPONDYLOSIS: ICD-10-CM

## 2018-03-19 PROCEDURE — 73502 X-RAY EXAM HIP UNI 2-3 VIEWS: CPT | Mod: 26,RT,, | Performed by: RADIOLOGY

## 2018-03-19 PROCEDURE — 99213 OFFICE O/P EST LOW 20 MIN: CPT | Mod: PBBFAC,25,PN | Performed by: ORTHOPAEDIC SURGERY

## 2018-03-19 PROCEDURE — 99203 OFFICE O/P NEW LOW 30 MIN: CPT | Mod: S$GLB,,, | Performed by: ORTHOPAEDIC SURGERY

## 2018-03-19 PROCEDURE — 99999 PR PBB SHADOW E&M-EST. PATIENT-LVL III: CPT | Mod: PBBFAC,,, | Performed by: ORTHOPAEDIC SURGERY

## 2018-03-19 PROCEDURE — 73502 X-RAY EXAM HIP UNI 2-3 VIEWS: CPT | Mod: TC,PO,RT

## 2018-03-19 NOTE — LETTER
March 19, 2018      Vitaly Cain MD  [address]  [place] [address]           [facility]  [address] [facility] [address] [place] [address]  Phone: [phone]          Patient: Bev Miguel   MR Number: [id number]   YOB: 1950   Date of Visit: 3/19/2018       Dear Dr. Vitaly Cain:    Thank you for referring Bev Miguel to me for evaluation. Attached you will find relevant portions of my assessment and plan of care.    If you have questions, please do not hesitate to call me. I look forward to following Bev Miguel along with you.    Sincerely,    Manny Ceja MD    Enclosure  CC:  No Recipients    If you would like to receive this communication electronically, please contact [email or web address] or [phone] to request more information on [employer] access.    For providers and/or their staff who would like to refer a patient to [facility], please contact us through our one-stop-shop provider referral line, [facility], at [phone].    If you feel you have received this communication in error or would no longer like to receive these types of communications, please e-mail [email or web address]

## 2018-03-19 NOTE — MEDICAL/APP STUDENT
DATE: 3/19/2018  PATIENT: Bev Miguel  REFERRING MD: Dr. Vitaly Cain  CHIEF COMPLAINT:   Chief Complaint   Patient presents with    Right Hip - Pain       HISTORY:  Bev Miguel is a 67 y.o. female , referred from  Dr. Vitaly Cain for R hip pain. States a long hx of intermittent hip pain, with this episode starting in 2016. States pain is located on lateral hip, and worse with walking or standing after prolonged sitting. Rates it 3/10. Denies injury, trauma, clicking, or popping. States it is alleviated with home exercise therapy. Has not tried injections. Denies numbness or tingling in the lower extremities.       PAST MEDICAL/SURGICAL HISTORY:  Past Medical History:   Diagnosis Date    Abnormal EKG     Allergy     perennial; sees Dr Jose ENT for injections    Asthma     mild interm    Breast CA     Cancer     ovarian    Chest pain     Chronic insomnia     Colonic polyp     Heart murmur     Hyperlipidemia     Hypoglycemia     Macrocytosis     Mitral valve regurgitation     Osteopenia     Ovarian cancer     SOB (shortness of breath)     Uterine cancer     Valvular regurgitation     Varicose vein of leg     Venous insufficiency     Vitamin C deficiency      Past Surgical History:   Procedure Laterality Date    BREAST SURGERY  05/28/2010    bilateral mastectomy/breast reconstruction; R breast ca.; no XRT/chemo. [facility] for 5 yrs.    OOPHORECTOMY      TC  02/20/2017    no polyps; GI/Dr March. 5 yr repeat.    TONSILLECTOMY      URETEROTOMY         Current Medications:   Current Outpatient Prescriptions:     alendronate (FOSAMAX) 35 MG tablet, TAKE 1 TABLET BY MOUTH WEEKLY, Disp: 12 tablet, Rfl: 0    amlodipine (NORVASC) 2.5 MG tablet, Take 0.5 tablets (1.25 mg total) by mouth once daily., Disp: 45 tablet, Rfl: 2    ascorbate calcium-bioflavonoid (LEIGHTON-C WITH BIOFLAVONOIDS) 500-200 mg Tab, Take 1 tablet by mouth once daily., Disp: , Rfl:     aspirin (ECOTRIN) 81 MG EC tablet,  Take 81 mg by mouth once daily., Disp: , Rfl:     azelastine (ASTELIN) 137 mcg (0.1 %) nasal spray, U 2 SPRAYS IEN QD, Disp: , Rfl: 5    biotin 2,500 mcg Cap, Take 5,000 mg by mouth once daily., Disp: , Rfl:     CALCIUM CARB,CIT/D3/PHYTOSTROL (CITRACAL D + HEART HEALTH ORAL), Take 600 mg by mouth 2 (two) times daily., Disp: , Rfl:     cyanocobalamin (VITAMIN B-12) 1000 MCG tablet, Take 3,000 mcg by mouth once daily., Disp: , Rfl:     fexofenadine (ALLEGRA) 180 MG tablet, Take 180 mg by mouth once daily., Disp: , Rfl:     fluticasone (FLONASE) 50 mcg/actuation nasal spray, SHAKE LQ AND U 1 SPR IEN BID, Disp: , Rfl: 4    hydrOXYzine HCl (ATARAX) 10 MG Tab, 10 mg every evening. , Disp: , Rfl:     methylsulfonylmethane (MSM) 1,000 mg Cap, Take 2,000 mg by mouth 3 (three) times daily., Disp: , Rfl:     montelukast (SINGULAIR) 10 mg tablet, Take 10 mg by mouth every evening., Disp: , Rfl:     MULTIVIT-MIN/IRON/FOLIC/LUTEIN (CENTRUM SILVER WOMEN ORAL), Take 1 capsule by mouth once daily., Disp: , Rfl:     vitamin D 1000 units Tab, Take 4,000 mg by mouth once daily., Disp: , Rfl:     vitamin E 400 UNIT capsule, Take 400 Units by mouth once daily., Disp: , Rfl:     Family History: family history was reviewed and is noncontributory  Social History:   Social History     Social History    Marital status:      Spouse name: N/A    Number of children: N/A    Years of education: N/A     Occupational History    retired sales management      Social History Main Topics    Smoking status: Current Every Day Smoker     Packs/day: 0.30     Years: 45.00    Smokeless tobacco: Never Used    Alcohol use 1.2 oz/week     2 Shots of liquor per week      Comment: 2 cocktails daily; wine 5 glasses a week.    Drug use: No    Sexual activity: Not on file     Other Topics Concern    Not on file     Social History Narrative    No narrative on file       ROS:  Constitution: Negative for chills, fever, and sweats. Negative  "for unexplained weight loss.  HENT: Negative for headaches and blurry vision.   Cardiovascular: Negative for chest pain, irregular heartbeat, leg swelling and palpitations.   Respiratory: Negative for cough and shortness of breath.   Gastrointestinal: Negative for abdominal pain, heartburn, nausea and vomiting.   Genitourinary: Negative for bladder incontinence and dysuria.   Musculoskeletal: Negative for systemic arthritis, muscle weakness and myalgias.   Neurological: Negative for numbness.   Psychiatric/Behavioral: Negative for depression.  Endocrine: Negative for polyuria.   Hematologic/Lymphatic: Negative for bleeding disorders.   Skin: Negative for poor wound healing.        PHYSICAL EXAM:  Ht 5' 7.5" (1.715 m)   Wt 73 kg (161 lb)   BMI 24.84 kg/m²   Bev Miguel is a well developed, well nourished female in no acute distress. Physical examination of the right hip and lumbar spine evaluated the following:    Gait and Alignment  Lumbar spine range of motion  Inspection for ecchymosis, swelling, atrophy, or deformity  Tenderness to palpation over the bony and soft tissue structures around the lower back/hip  Inspection for intra-articular and/or bursal effusions  Range of Motion and presence of contractures  Sensation and motor strength to the lower extremity  Pain/pop/click with logrolling or range of motion  Straight leg raise testing  Vascular exam to include skin temperature/color/capillary refill    Remarkable findings included:  Pt has a nonantalgic gait. Inspection of the right hip reveals no swelling/ecchymosis/erythema/deformity/atrophy/asymmetry. There is tenderness to palpation over the greater trochanter. Pain with internal rotation of the hip. Range of motion is full to flexion, abduction and external rotation. No pop or click is elicited with range of motion. Sensation is intact L2-S1. Motor strength is 5/5 in the hip flexors, extensors, abductors and adductors. Lower extremity vascular exam is " notable for normal skin temperature, color and capillary refill. No pseudomotor signs are present.        IMAGING:   Xray hip was performed 3/19/18 and personally reviewed by me, showing: osteoarthritis of the right hip with joint space narrowing and periarticular osteophyte formation.  No fracture or subluxation are identified.  There are no signs of femoral head avascular necrosis.  The pelvis is intact.    ASSESSMENT:  Bev Miguel is a 68 yo F with R hip OA.    PLAN:  The nature of the diagnosis, using models and diagrams when appropriate, was explained to the patient in detail.Treatment option discussed included activity modification, NSAID and rest, or injection. All questions answered and the patient wishes to continue with NSAIDS and activity modification. Followup as needed.      This note was dictated using voice recognition software. Please excuse any grammatical or typographical errors.

## 2018-03-20 NOTE — PROGRESS NOTES
DATE: 3/19/2018  PATIENT: Bev Miguel  REFERRING MD: Dr. Vitaly Cain  CHIEF COMPLAINT:   Chief Complaint   Patient presents with    Right Hip - Pain         HISTORY:  Bev Miguel is a 67 y.o. female , referred from  Dr. Vitaly Cain for right hip pain. States a long hx of intermittent hip pain, with this episode starting in 2016. States pain is located on lateral hip, and worse with walking or standing after prolonged sitting.Denies injury, trauma, clicking, or popping. States it is alleviated with home exercise therapy. Has not tried injections. Denies numbness or tingling in the lower extremities.  She reports most difficulty with crossing her legs with the right leg over the left.  She notes stiffness in the morning which seems to resolve with activity.  She does taken ibuprofen which does seem to help.  She does have a long history of low back issues which has been treated with chiropractic treatments.  Pain is reported at 5/10 today.        PAST MEDICAL/SURGICAL HISTORY:       Past Medical History:   Diagnosis Date    Abnormal EKG      Allergy       perennial; sees Dr Jose ENT for injections    Asthma       mild interm    Breast CA      Cancer       ovarian    Chest pain      Chronic insomnia      Colonic polyp      Heart murmur      Hyperlipidemia      Hypoglycemia      Macrocytosis      Mitral valve regurgitation      Osteopenia      Ovarian cancer      SOB (shortness of breath)      Uterine cancer      Valvular regurgitation      Varicose vein of leg      Venous insufficiency      Vitamin C deficiency              Past Surgical History:   Procedure Laterality Date    BREAST SURGERY   05/28/2010     bilateral mastectomy/breast reconstruction; R breast ca.; no XRT/chemo. [facility] for 5 yrs.    OOPHORECTOMY        TC   02/20/2017     no polyps; GI/Dr March. 5 yr repeat.    TONSILLECTOMY        URETEROTOMY             Current Medications:   Current Outpatient Prescriptions:      alendronate (FOSAMAX) 35 MG tablet, TAKE 1 TABLET BY MOUTH WEEKLY, Disp: 12 tablet, Rfl: 0    amlodipine (NORVASC) 2.5 MG tablet, Take 0.5 tablets (1.25 mg total) by mouth once daily., Disp: 45 tablet, Rfl: 2    ascorbate calcium-bioflavonoid (LEIGHTON-C WITH BIOFLAVONOIDS) 500-200 mg Tab, Take 1 tablet by mouth once daily., Disp: , Rfl:     aspirin (ECOTRIN) 81 MG EC tablet, Take 81 mg by mouth once daily., Disp: , Rfl:     azelastine (ASTELIN) 137 mcg (0.1 %) nasal spray, U 2 SPRAYS IEN QD, Disp: , Rfl: 5    biotin 2,500 mcg Cap, Take 5,000 mg by mouth once daily., Disp: , Rfl:     CALCIUM CARB,CIT/D3/PHYTOSTROL (CITRACAL D + HEART HEALTH ORAL), Take 600 mg by mouth 2 (two) times daily., Disp: , Rfl:     cyanocobalamin (VITAMIN B-12) 1000 MCG tablet, Take 3,000 mcg by mouth once daily., Disp: , Rfl:     fexofenadine (ALLEGRA) 180 MG tablet, Take 180 mg by mouth once daily., Disp: , Rfl:     fluticasone (FLONASE) 50 mcg/actuation nasal spray, SHAKE LQ AND U 1 SPR IEN BID, Disp: , Rfl: 4    hydrOXYzine HCl (ATARAX) 10 MG Tab, 10 mg every evening. , Disp: , Rfl:     methylsulfonylmethane (MSM) 1,000 mg Cap, Take 2,000 mg by mouth 3 (three) times daily., Disp: , Rfl:     montelukast (SINGULAIR) 10 mg tablet, Take 10 mg by mouth every evening., Disp: , Rfl:     MULTIVIT-MIN/IRON/FOLIC/LUTEIN (CENTRUM SILVER WOMEN ORAL), Take 1 capsule by mouth once daily., Disp: , Rfl:     vitamin D 1000 units Tab, Take 4,000 mg by mouth once daily., Disp: , Rfl:     vitamin E 400 UNIT capsule, Take 400 Units by mouth once daily., Disp: , Rfl:      Family History: family history was reviewed and is noncontributory  Social History:   Social History   Social History            Social History    Marital status:        Spouse name: N/A    Number of children: N/A    Years of education: N/A           Occupational History    retired sales management               Social History Main Topics    Smoking status:  "Current Every Day Smoker       Packs/day: 0.30       Years: 45.00    Smokeless tobacco: Never Used    Alcohol use 1.2 oz/week       2 Shots of liquor per week         Comment: 2 cocktails daily; wine 5 glasses a week.    Drug use: No    Sexual activity: Not on file           Other Topics Concern    Not on file          Social History Narrative    No narrative on file            ROS:  Constitution: Negative for chills, fever, and sweats. Negative for unexplained weight loss.  HENT: Negative for headaches and blurry vision.   Cardiovascular: Negative for chest pain, irregular heartbeat, leg swelling and palpitations.   Respiratory: Negative for cough and shortness of breath.   Gastrointestinal: Negative for abdominal pain, heartburn, nausea and vomiting.   Genitourinary: Negative for bladder incontinence and dysuria.   Musculoskeletal: Negative for systemic arthritis, muscle weakness and myalgias.   Neurological: Negative for numbness.   Psychiatric/Behavioral: Negative for depression.  Endocrine: Negative for polyuria.   Hematologic/Lymphatic: Negative for bleeding disorders.   Skin: Negative for poor wound healing.          PHYSICAL EXAM:  Ht 5' 7.5" (1.715 m)   Wt 73 kg (161 lb)   BMI 24.84 kg/m²   Bev Miguel is a well developed, well nourished female in no acute distress. Physical examination of the right hip and lumbar spine evaluated the following:     Gait and Alignment  Lumbar spine range of motion  Inspection for ecchymosis, swelling, atrophy, or deformity  Tenderness to palpation over the bony and soft tissue structures around the lower back/hip  Inspection for intra-articular and/or bursal effusions  Range of Motion and presence of contractures  Sensation and motor strength to the lower extremity  Pain/pop/click with logrolling or range of motion  Straight leg raise testing  Vascular exam to include skin temperature/color/capillary refill     Remarkable findings included:  Pt has a nonantalgic " gait. Inspection of the right hip reveals no swelling/ecchymosis/erythema/deformity/atrophy/asymmetry. There is tenderness to palpation over the greater trochanter. Pain with flexion and internal rotation of the hip. Range of motion is full to flexion, abduction and external rotation. there is limited internal rotation to 10°.   No pop or click is elicited with range of motion. Sensation is intact L2-S1. Motor strength is 5/5 in the hip flexors, extensors, abductors and adductors. Lower extremity vascular exam is notable for normal skin temperature, color and capillary refill. No pseudomotor signs are present.           IMAGING:   Xray hip was performed 3/19/18 and personally reviewed by me, showing: osteoarthritis of the right hip with joint space narrowing and periarticular osteophyte formation.  No fracture or subluxation are identified.  There are no signs of femoral head avascular necrosis.  The pelvis is intact.  Patient right outside films of her lumbar spine dated 2016.  Moderate degenerative changes with a grade 1 spondylolisthesis at L1-2 noted.  Moderate disc space narrowing identified     ASSESSMENT:  Osteoarthrosis right hip  Lumbar spondylosis     PLAN:  The nature of the diagnosis, using models and diagrams when appropriate, was explained to the patient in detail.Treatment option discussed included activity modification, NSAID and rest, or injection. All questions answered and the patient wishes to continue with NSAIDS and activity modification.  I've also recommended gentle range of motion exercises to may include yoga for other stretching modalities to try and maintain motion.  She'll monitor her symptoms.  Should she continue to have significant discomfort, she'll return for consideration of ultrasound guided cortisone injection to the right hip.      This note was dictated using voice recognition software. Please excuse any grammatical or typographical errors.

## 2018-04-11 ENCOUNTER — OFFICE VISIT (OUTPATIENT)
Dept: FAMILY MEDICINE | Facility: CLINIC | Age: 68
End: 2018-04-11
Payer: MEDICARE

## 2018-04-11 VITALS
WEIGHT: 161.81 LBS | DIASTOLIC BLOOD PRESSURE: 80 MMHG | OXYGEN SATURATION: 98 % | HEART RATE: 74 BPM | TEMPERATURE: 98 F | BODY MASS INDEX: 24.52 KG/M2 | SYSTOLIC BLOOD PRESSURE: 124 MMHG | HEIGHT: 68 IN

## 2018-04-11 DIAGNOSIS — E55.9 VITAMIN D INSUFFICIENCY: ICD-10-CM

## 2018-04-11 DIAGNOSIS — Z78.9 ALCOHOL USE: ICD-10-CM

## 2018-04-11 DIAGNOSIS — Z72.0 TOBACCO USE: ICD-10-CM

## 2018-04-11 DIAGNOSIS — E78.5 HYPERLIPIDEMIA, UNSPECIFIED HYPERLIPIDEMIA TYPE: ICD-10-CM

## 2018-04-11 DIAGNOSIS — D75.89 MACROCYTOSIS: ICD-10-CM

## 2018-04-11 DIAGNOSIS — E53.8 B12 DEFICIENCY: ICD-10-CM

## 2018-04-11 DIAGNOSIS — J45.20 MILD INTERMITTENT ASTHMA WITHOUT COMPLICATION: ICD-10-CM

## 2018-04-11 DIAGNOSIS — M85.80 OSTEOPENIA, UNSPECIFIED LOCATION: Primary | ICD-10-CM

## 2018-04-11 PROCEDURE — 99214 OFFICE O/P EST MOD 30 MIN: CPT | Mod: S$PBB,,, | Performed by: INTERNAL MEDICINE

## 2018-04-11 PROCEDURE — 99213 OFFICE O/P EST LOW 20 MIN: CPT | Mod: PBBFAC,PN | Performed by: INTERNAL MEDICINE

## 2018-04-11 PROCEDURE — 99999 PR PBB SHADOW E&M-EST. PATIENT-LVL III: CPT | Mod: PBBFAC,,, | Performed by: INTERNAL MEDICINE

## 2018-04-11 RX ORDER — ALENDRONATE SODIUM 70 MG/1
70 TABLET ORAL
Qty: 15 TABLET | Refills: 1 | Status: SHIPPED | OUTPATIENT
Start: 2018-04-11 | End: 2021-11-22 | Stop reason: SDUPTHER

## 2018-04-11 RX ORDER — ALENDRONATE SODIUM 70 MG/1
70 TABLET ORAL
Qty: 15 TABLET | Refills: 1 | Status: SHIPPED | OUTPATIENT
Start: 2018-04-11 | End: 2018-04-11

## 2018-04-11 NOTE — PROGRESS NOTES
Subjective:       Patient ID: Bev Miguel is a 67 y.o. female.    Chief Complaint: Follow-up (labs)    HPI  Overall doing fine. Nicotine: down to 5 cigs a day; knows she needs to quit and potential SE's of continuing.  Osteopenia; exercises w walking 1-2 miles per day. On her calcium w vit D supplements. DEXA reviewed w pt at length; markedly worsened from 1/6/16 study; Tscore mean fem necks -2.4; Lsp -0.2. Last Vit D level 9/5/17 was 56. 4/5/18 calcium 9.2. TSH/freeT4 were nl as well.  Macrocytosis; alcohol 2 cocktails then 1 glass wine 6 days a week; B12/folate levels not deficient.  Asthma; markedly improved w sinuplasty procedure; uses rescue not needed now.     Review of Systems   Constitutional: Negative for appetite change, fever and unexpected weight change.   HENT: Positive for postnasal drip. Negative for congestion, rhinorrhea and sinus pressure.         Seasonal allergies; perennial hx as well. Recent sinuplasty by Dr Jose really helped. Hopefully ending allergy shots; at q 3 weeks.   Eyes: Negative for discharge and itching.   Respiratory: Negative for cough, chest tightness, shortness of breath and wheezing.    Cardiovascular: Negative for chest pain, palpitations and leg swelling.   Gastrointestinal: Negative for abdominal distention, abdominal pain, blood in stool, constipation, diarrhea, nausea and vomiting.   Endocrine: Negative for polydipsia, polyphagia and polyuria.   Genitourinary: Negative for dysuria and hematuria.   Musculoskeletal: Negative for arthralgias and myalgias.   Skin: Negative for rash.   Allergic/Immunologic: Negative for environmental allergies and food allergies.   Neurological: Negative for tremors, seizures and syncope.   Hematological: Negative for adenopathy. Does not bruise/bleed easily.   Psychiatric/Behavioral:        Denies anxiety or depression.       Objective:      Vitals:    04/11/18 1306   BP: 124/80   BP Location: Left arm   Patient Position: Sitting   BP  "Method: Medium (Manual)   Pulse: 74   Temp: 98.4 °F (36.9 °C)   TempSrc: Oral   SpO2: 98%   Weight: 73.4 kg (161 lb 13.1 oz)   Height: 5' 7.5" (1.715 m)     Body mass index is 24.97 kg/m².    Physical Exam   Constitutional: She is oriented to person, place, and time. She appears well-developed and well-nourished.   HENT:   Head: Normocephalic and atraumatic.   Eyes: EOM are normal.   Neck: Normal range of motion. Neck supple. No thyromegaly present.   No carotid bruits heard   Cardiovascular: Normal rate, regular rhythm and normal heart sounds.  Exam reveals no gallop.    No murmur heard.  Pulmonary/Chest: Effort normal and breath sounds normal. No respiratory distress. She has no wheezes. She has no rales.   Abdominal: Soft. Bowel sounds are normal. She exhibits no distension. There is no tenderness. There is no rebound and no guarding.   Musculoskeletal: Normal range of motion. She exhibits no edema.   Lymphadenopathy:     She has no cervical adenopathy.   Neurological: She is alert and oriented to person, place, and time.   Moves all 4 extremities fine.   Skin: No rash noted.   Psychiatric: She has a normal mood and affect. Her behavior is normal. Thought content normal.   Vitals reviewed.      Assessment:       1. Osteopenia, unspecified location    2. Vitamin D insufficiency    3. Tobacco use    4. Alcohol use    5. Macrocytosis    6. B12 deficiency    7. Hyperlipidemia, unspecified hyperlipidemia type    8. Mild intermittent asthma without complication        Plan:       Osteopenia, unspecified location. Weight bearing exercises, continue calcium and vit D supplements. DEXA in 1 yr; increase calcium to 2 a day. On citracal 600-D.    Vitamin D insufficiency; levels need to be checked; on vit D3 4000 iu a day.     Tobacco use; knows she needs to quit; weaning off on own; no meds desired.    Alcohol use; needs to wean off as well; will try 505 cut back.    Macrocytosis; needs to see Hem dr Sloan for further " evaluation; B12/folate not reduced.     B12 deficiency; levels therapeutic.    Hyperlipidemia, unspecified hyperlipidemia type. Maintain low fat high fiber diet, exercise regularly.    Mild intermittent asthma without complication; has been doing fine since sinuplasty; rescue not needed.    Other orders  -     Discontinue: alendronate (FOSAMAX) 70 MG tablet; Take 1 tablet (70 mg total) by mouth every 7 days.  Dispense: 15 tablet; Refill: 1  -     alendronate (FOSAMAX) 70 MG tablet; Take 1 tablet (70 mg total) by mouth every 7 days.  Dispense: 15 tablet; Refill: 1

## 2018-04-11 NOTE — PATIENT INSTRUCTIONS
Osteopenia, unspecified location. Weight bearing exercises, continue calcium and vit D supplements. DEXA in 1 yr; increase calcium to 2 a day. On citracal 600-D.    Vitamin D insufficiency; levels need to be checked; on vit D3 4000 iu a day.     Tobacco use; knows she needs to quit; weaning off on own; no meds desired.    Alcohol use; needs to wean off as well; will try 505 cut back.    Macrocytosis; needs to see Hem dr Nathalia for further evaluation; B12/folate not reduced.     B12 deficiency; levels therapeutic.    Hyperlipidemia, unspecified hyperlipidemia type. Maintain low fat high fiber diet, exercise regularly.    Mild intermittent asthma without complication; has been doing fine since sinuplasty; rescue not needed.    Other orders  -     Discontinue: alendronate (FOSAMAX) 70 MG tablet; Take 1 tablet (70 mg total) by mouth every 7 days.  Dispense: 15 tablet; Refill: 1  -     alendronate (FOSAMAX) 70 MG tablet; Take 1 tablet (70 mg total) by mouth every 7 days.  Dispense: 15 tablet; Refill: 1

## 2018-06-13 ENCOUNTER — OFFICE VISIT (OUTPATIENT)
Dept: FAMILY MEDICINE | Facility: CLINIC | Age: 68
End: 2018-06-13
Payer: MEDICARE

## 2018-06-13 VITALS
TEMPERATURE: 98 F | HEIGHT: 68 IN | SYSTOLIC BLOOD PRESSURE: 132 MMHG | WEIGHT: 162.06 LBS | HEART RATE: 64 BPM | BODY MASS INDEX: 24.56 KG/M2 | DIASTOLIC BLOOD PRESSURE: 68 MMHG

## 2018-06-13 DIAGNOSIS — E53.8 B12 DEFICIENCY: ICD-10-CM

## 2018-06-13 DIAGNOSIS — D75.89 MACROCYTOSIS: ICD-10-CM

## 2018-06-13 DIAGNOSIS — Z23 NEED FOR SHINGLES VACCINE: ICD-10-CM

## 2018-06-13 DIAGNOSIS — J45.20 MILD INTERMITTENT ASTHMA WITHOUT COMPLICATION: ICD-10-CM

## 2018-06-13 DIAGNOSIS — J30.89 SEASONAL AND PERENNIAL ALLERGIC RHINITIS: ICD-10-CM

## 2018-06-13 DIAGNOSIS — Z23 NEED FOR TDAP VACCINATION: ICD-10-CM

## 2018-06-13 DIAGNOSIS — J30.2 SEASONAL AND PERENNIAL ALLERGIC RHINITIS: ICD-10-CM

## 2018-06-13 DIAGNOSIS — M85.80 OSTEOPENIA, UNSPECIFIED LOCATION: Primary | ICD-10-CM

## 2018-06-13 DIAGNOSIS — Z78.9 ALCOHOL USE: ICD-10-CM

## 2018-06-13 DIAGNOSIS — E55.9 VITAMIN D INSUFFICIENCY: ICD-10-CM

## 2018-06-13 DIAGNOSIS — Z72.0 NICOTINE ABUSE: ICD-10-CM

## 2018-06-13 PROCEDURE — 99213 OFFICE O/P EST LOW 20 MIN: CPT | Mod: PBBFAC,PN | Performed by: INTERNAL MEDICINE

## 2018-06-13 PROCEDURE — 99214 OFFICE O/P EST MOD 30 MIN: CPT | Mod: S$PBB,,, | Performed by: INTERNAL MEDICINE

## 2018-06-13 PROCEDURE — 99999 PR PBB SHADOW E&M-EST. PATIENT-LVL III: CPT | Mod: PBBFAC,,, | Performed by: INTERNAL MEDICINE

## 2018-06-13 NOTE — PROGRESS NOTES
Subjective:       Patient ID: Bev Miguel is a 67 y.o. female.    Chief Complaint: Follow-up    HPI   Overall doing.  Asthma; doing fine; not needing her rescue inhaler.  Still smokes about 5-7 cigs a day. Has nicorette gum if needed; but not used. Not ready toquit though yet.  HLP: not on low fat diet; not on statin.   Vit D insufficiency; on her calcium and vit D supplements.  Osteopenia; walks a lot around 2 mi a day w phone clocking here. On fosamax. 4/5/18 DEXA w significantly worsened BMD;      fem neck avr Tscore -2.4; fosamax increased earlier to 70 mg a week. Labs and DEXA reviewed w pt.  Norvasc helps her arteriospasm involving her toes; sees Dr Reynolds. Total time: 300-340 pm; .505 time spent on discussion, counseling, and review    Review of Systems   Constitutional: Negative for appetite change and fever.   HENT: Positive for postnasal drip. Negative for congestion, rhinorrhea and sinus pressure.         Seasonal allergies   Eyes: Negative for discharge and itching.   Respiratory: Negative for cough, chest tightness, shortness of breath and wheezing.    Cardiovascular: Negative for chest pain, palpitations and leg swelling.   Gastrointestinal: Negative for abdominal distention, abdominal pain, blood in stool, constipation, diarrhea, nausea and vomiting.   Endocrine: Negative for polydipsia, polyphagia and polyuria.   Genitourinary: Negative for dysuria and hematuria.   Musculoskeletal: Positive for back pain. Negative for arthralgias and myalgias.        Chronic for her.   Skin: Negative for rash.   Allergic/Immunologic: Positive for environmental allergies. Negative for food allergies.   Neurological: Negative for tremors and syncope.   Hematological: Negative for adenopathy. Does not bruise/bleed easily.   Psychiatric/Behavioral:        Denies anxiety/depression.       Objective:      Vitals:    06/13/18 1440   BP: 132/68   Pulse: 64   Temp: 98 °F (36.7 °C)   Weight: 73.5 kg (162 lb 0.6 oz)   Height:  "5' 7.5" (1.715 m)     Body mass index is 25 kg/m².    Physical Exam   Constitutional: She is oriented to person, place, and time. She appears well-developed and well-nourished.   HENT:   Head: Normocephalic and atraumatic.   Eyes: EOM are normal.   Neck: Normal range of motion. Neck supple. No thyromegaly present.   Cardiovascular: Normal rate, regular rhythm and normal heart sounds.  Exam reveals no gallop.    No murmur heard.  Pulmonary/Chest: Effort normal and breath sounds normal. No respiratory distress. She has no wheezes. She has no rales.   Abdominal: Soft. Bowel sounds are normal. She exhibits no distension. There is no tenderness. There is no rebound and no guarding.   Musculoskeletal: Normal range of motion. She exhibits no edema.   Lymphadenopathy:     She has no cervical adenopathy.   Neurological: She is alert and oriented to person, place, and time.   Moves all 4 extremities fine.   Skin: No rash noted.   Psychiatric: She has a normal mood and affect. Her behavior is normal. Thought content normal.   Vitals reviewed.      Assessment:       1. Osteopenia, unspecified location    2. Vitamin D insufficiency    3. Alcohol use    4. Macrocytosis    5. B12 deficiency    6. Nicotine abuse    7. Mild intermittent asthma without complication    8. Seasonal and perennial allergic rhinitis    9. Need for Tdap vaccination    10. Need for shingles vaccine        Plan:         Mild intermittent asthma without complication; rescue inhaler as needed.    Alcohol use; needs to markedly decrease to min 1 drink a day if she can't completely quit.    Nicotine abuse; not ready to quit this yet; aware of potential side effects.    Macrocytosis; suspect due to alcohol and B12 deficiency; levels therapeutic.    Osteopenia, unspecified location; DEXA due 4/5/19 due to significant worsening of present DEXA 4/5/18. Weight bearing exercises, continue calcium and vit D supplements. DEXA in 1 yr, and cont fosamax at 70 mg a week; " knows she needs to quit smoking and drinking.    Vitamin D insufficiency; levels therapeutic.    Seasonal and perennial allergic rhinitis; use claritin 10- mg as needed for congestion;       allergy injections have helped a lot; still getting as 2 injections louie 3 weeks.    Need for Tdap vaccine; given script.    Need for shingles vaccine; given script; potential side effects discussed; to be performed 1 month after Tdap; 2nd in series to be repeated x1 in 2-6 mos after first shingrix.

## 2018-06-13 NOTE — PATIENT INSTRUCTIONS
Mild intermittent asthma without complication; rescue inhaler as needed.    Alcohol use; needs to markedly decrease to min 1 drink a day if she can't completely quit.    Nicotine abuse; not ready to quit this yet; aware of potential side effects.    Macrocytosis; suspect due to alcohol and B12 deficiency; levels therapeutic.    Osteopenia, unspecified location; DEXA due 4/5/19 due to significant worsening of present DEXA 4/5/18. Weight bearing exercises, continue calcium and vit D supplements. DEXA in 1 yr, and cont fosamax at 70 mg a week; knows she needs to quit smoking and drinking.    Vitamin D insufficiency; levels therapeutic.    Seasonal and perennial allergic rhinitis; use claritin 10- mg as needed for congestion;       allergy injections have helped a lot; still getting as 2 injections louie 3 weeks.    Need for Tdap vaccine; given script.    Need for shingles vaccine; given script; potential side effects discussed; to be performed 1 month after Tdap; 2nd in series to be repeated x1 in 2-6 mos after first shingrix.

## 2018-07-30 ENCOUNTER — OFFICE VISIT (OUTPATIENT)
Dept: CARDIOLOGY | Facility: CLINIC | Age: 68
End: 2018-07-30
Payer: MEDICARE

## 2018-07-30 VITALS
BODY MASS INDEX: 24.66 KG/M2 | HEIGHT: 68 IN | DIASTOLIC BLOOD PRESSURE: 81 MMHG | SYSTOLIC BLOOD PRESSURE: 138 MMHG | HEART RATE: 90 BPM | WEIGHT: 162.69 LBS

## 2018-07-30 DIAGNOSIS — I34.0 NON-RHEUMATIC MITRAL REGURGITATION: ICD-10-CM

## 2018-07-30 DIAGNOSIS — I73.00 RAYNAUD'S DISEASE WITHOUT GANGRENE: Primary | ICD-10-CM

## 2018-07-30 DIAGNOSIS — Z72.0 TOBACCO USE: ICD-10-CM

## 2018-07-30 PROBLEM — E78.5 HYPERLIPIDEMIA: Status: RESOLVED | Noted: 2017-09-11 | Resolved: 2018-07-30

## 2018-07-30 PROCEDURE — 99999 PR PBB SHADOW E&M-EST. PATIENT-LVL IV: CPT | Mod: PBBFAC,,, | Performed by: INTERNAL MEDICINE

## 2018-07-30 PROCEDURE — 99214 OFFICE O/P EST MOD 30 MIN: CPT | Mod: S$PBB,,, | Performed by: INTERNAL MEDICINE

## 2018-07-30 PROCEDURE — 99214 OFFICE O/P EST MOD 30 MIN: CPT | Mod: PBBFAC,PO | Performed by: INTERNAL MEDICINE

## 2018-07-30 RX ORDER — AMLODIPINE BESYLATE 2.5 MG/1
1.25 TABLET ORAL DAILY
Qty: 45 TABLET | Refills: 2 | Status: SHIPPED | OUTPATIENT
Start: 2018-07-30 | End: 2019-04-29 | Stop reason: DRUGHIGH

## 2018-07-30 RX ORDER — IBUPROFEN 200 MG
200 TABLET ORAL EVERY 6 HOURS PRN
COMMUNITY

## 2018-07-30 NOTE — PROGRESS NOTES
Subjective:    Patient ID:  Bev Miguel is a 67 y.o. female who presents for Hyperlipidemia and Valvular Heart Disease        HPI  RECENT LABS NOTED, DOIUNG WELL, STILL SMOKES 1/2 PPD,RAYNAUD'S OK WITH MEDS, LAST LDL 61,,  SEE ROS    Past Medical History:   Diagnosis Date    Abnormal EKG     Allergy     perennial; sees Dr Jose ENT for injections    Asthma     mild interm    Breast CA     Cancer     ovarian    Chest pain     Chronic insomnia     Colonic polyp     Heart murmur     Hyperlipidemia     Hypoglycemia     Macrocytosis     Mitral valve regurgitation     Osteopenia     Ovarian cancer     SOB (shortness of breath)     Uterine cancer     Valvular regurgitation     Varicose vein of leg     Venous insufficiency     Vitamin C deficiency      Past Surgical History:   Procedure Laterality Date    BREAST SURGERY  05/28/2010    bilateral mastectomy/breast reconstruction; R breast ca.; no XRT/chemo. [facility] for 5 yrs.    OOPHORECTOMY      TC  02/20/2017    no polyps; GI/Dr March. 5 yr repeat.    TONSILLECTOMY      URETEROTOMY       Family History   Problem Relation Age of Onset    Early death Mother     Miscarriages / Stillbirths Mother     Cancer Father     Early death Father     Asthma Brother     Early death Brother      Social History     Social History    Marital status:      Spouse name: N/A    Number of children: N/A    Years of education: N/A     Occupational History    retired [employer]      Social History Main Topics    Smoking status: Current Every Day Smoker     Packs/day: 0.30     Years: 45.00    Smokeless tobacco: Never Used    Alcohol use 1.2 oz/week     2 Shots of liquor per week      Comment: 2 cocktails daily; wine 5 glasses a week.    Drug use: No    Sexual activity: Not on file     Other Topics Concern    Not on file     Social History Narrative    No narrative on file       Review of patient's allergies indicates:   Allergen  Reactions    Benadryl [diphenhydramine hcl]     Biaxin [clarithromycin]     Iodine and iodide containing products     Levaquin [levofloxacin]     Sulfa (sulfonamide antibiotics)     Trazodone     Zyban [bupropion hcl (smoking deter)]     Bactrim [sulfamethoxazole-trimethoprim] Rash       Current Outpatient Prescriptions:     alendronate (FOSAMAX) 70 MG tablet, Take 1 tablet (70 mg total) by mouth every 7 days., Disp: 15 tablet, Rfl: 1    amLODIPine (NORVASC) 2.5 MG tablet, Take 0.5 tablets (1.25 mg total) by mouth once daily., Disp: 45 tablet, Rfl: 2    ascorbate calcium-bioflavonoid (LEIGHTON-C WITH BIOFLAVONOIDS) 500-200 mg Tab, Take 1 tablet by mouth once daily., Disp: , Rfl:     aspirin (ECOTRIN) 81 MG EC tablet, Take 81 mg by mouth once daily., Disp: , Rfl:     biotin 2,500 mcg Cap, Take 5,000 mg by mouth once daily., Disp: , Rfl:     CALCIUM CARB,CIT/D3/PHYTOSTROL (CITRACAL D + HEART HEALTH ORAL), Take 600 mg by mouth 2 (two) times daily., Disp: , Rfl:     cyanocobalamin (VITAMIN B-12) 1000 MCG tablet, Take 3,000 mcg by mouth once daily., Disp: , Rfl:     ibuprofen (ADVIL,MOTRIN) 200 MG tablet, Take 200 mg by mouth every 6 (six) hours as needed for Pain., Disp: , Rfl:     MULTIVIT-MIN/IRON/FOLIC/LUTEIN (CENTRUM SILVER WOMEN ORAL), Take 1 capsule by mouth once daily., Disp: , Rfl:     UNABLE TO FIND, Tumeric 500 mg 1 tablet daily, Disp: , Rfl:     vitamin D 1000 units Tab, Take 4,000 mg by mouth once daily., Disp: , Rfl:     vitamin E 400 UNIT capsule, Take 400 Units by mouth once daily., Disp: , Rfl:     hydrOXYzine HCl (ATARAX) 10 MG Tab, 10 mg every evening. , Disp: , Rfl:     montelukast (SINGULAIR) 10 mg tablet, Take 10 mg by mouth every evening., Disp: , Rfl:     Review of Systems   Constitution: Negative for chills, diaphoresis, weakness, malaise/fatigue and night sweats.   HENT: Negative for congestion.    Eyes: Negative for blurred vision and discharge.   Cardiovascular: Negative for  "chest pain, claudication, cyanosis, dyspnea on exertion, irregular heartbeat, leg swelling, near-syncope, orthopnea, palpitations, paroxysmal nocturnal dyspnea and syncope.        RAYNUD'S SX IN TOES, SOME IN FINGERS MUCH BETTER WITH MEDS   Respiratory: Negative for cough, hemoptysis, shortness of breath, sputum production and wheezing.    Endocrine: Negative for cold intolerance and heat intolerance.   Hematologic/Lymphatic: Negative for adenopathy. Does not bruise/bleed easily.   Skin: Negative for color change (BETTER), itching, nail changes and rash.   Musculoskeletal: Negative for back pain (CHRONIC) and falls.   Gastrointestinal: Negative for abdominal pain, dysphagia, heartburn, hematemesis, jaundice and melena.   Genitourinary: Negative for dysuria, flank pain and frequency.   Neurological: Negative for brief paralysis, dizziness, focal weakness, light-headedness, loss of balance, numbness and tremors.   Psychiatric/Behavioral: Negative for altered mental status and substance abuse. The patient is not nervous/anxious.    Allergic/Immunologic: Negative for persistent infections.        Objective:      Vitals:    07/30/18 1444   BP: 138/81   Pulse: 90   Weight: 73.8 kg (162 lb 11.2 oz)   Height: 5' 7.5" (1.715 m)   PainSc: 0-No pain     Body mass index is 25.11 kg/m².    Physical Exam   Constitutional: She is oriented to person, place, and time. She appears well-developed and well-nourished.   HENT:   Head: Normocephalic and atraumatic.   Mouth/Throat: Oropharynx is clear and moist and mucous membranes are normal.   Eyes: Conjunctivae and EOM are normal. Pupils are equal, round, and reactive to light.   Neck: Normal range of motion. Neck supple. Normal carotid pulses, no hepatojugular reflux and no JVD present. Carotid bruit is not present. No tracheal deviation present. No thyromegaly present.   Cardiovascular: Normal rate and regular rhythm.  Exam reveals no gallop and no friction rub.    Murmur heard.   " Systolic murmur is present with a grade of 1/6  at the lower left sternal border, apex  Pulses:       Carotid pulses are 2+ on the right side, and 2+ on the left side.       Radial pulses are 2+ on the right side, and 2+ on the left side.        Femoral pulses are 2+ on the right side, and 2+ on the left side.       Dorsalis pedis pulses are 2+ on the right side, and 2+ on the left side.        Posterior tibial pulses are 2+ on the right side, and 2+ on the left side.   OK DECREASED CAPILLARY REFILL, SLIGHTLY COOL TOES   Pulmonary/Chest: Effort normal and breath sounds normal. She has no wheezes. She has no rales. She exhibits no tenderness.   Abdominal: Soft. Bowel sounds are normal. She exhibits no distension. There is no hepatosplenomegaly. There is no tenderness. There is no guarding and no CVA tenderness.   Musculoskeletal: She exhibits no edema or tenderness.   Lymphadenopathy:     She has no cervical adenopathy.   Neurological: She is alert and oriented to person, place, and time. She has normal strength. She displays no tremor. No cranial nerve deficit.   Skin: Skin is warm and dry. No rash noted. No erythema.   Psychiatric: She has a normal mood and affect. Her behavior is normal.               ..    Chemistry        Component Value Date/Time     04/05/2018 0858    K 4.8 04/05/2018 0858     04/05/2018 0858    CO2 29 04/05/2018 0858    BUN 17 04/05/2018 0858    CREATININE 0.60 04/05/2018 0858    GLU 91 04/05/2018 0858        Component Value Date/Time    CALCIUM 9.2 04/05/2018 0858    ALKPHOS 77 04/05/2018 0858    AST 30 04/05/2018 0858    AST 27 03/14/2016 1523    ALT 34 04/05/2018 0858    BILITOT 0.5 04/05/2018 0858    ESTGFRAFRICA >60 04/05/2018 0858    EGFRNONAA >60 04/05/2018 0858            ..  Lab Results   Component Value Date    CHOL 177 04/05/2018    CHOL 165 09/18/2017    CHOL 185 03/09/2016     Lab Results   Component Value Date     (H) 04/05/2018    HDL 92 (H) 09/18/2017    HDL  93 (H) 03/09/2016     Lab Results   Component Value Date    LDLCALC 61.2 (L) 04/05/2018    LDLCALC 61.8 (L) 09/18/2017    LDLCALC 81.4 03/09/2016     Lab Results   Component Value Date    TRIG 74 04/05/2018    TRIG 56 09/18/2017    TRIG 53 03/09/2016     Lab Results   Component Value Date    CHOLHDL 57.1 (H) 04/05/2018    CHOLHDL 55.8 (H) 09/18/2017    CHOLHDL 50.3 (H) 03/09/2016     ..  Lab Results   Component Value Date    WBC 5.45 04/05/2018    HGB 12.7 04/05/2018    HCT 39.0 04/05/2018     (H) 04/05/2018     04/05/2018       Test(s) Reviewed  I have reviewed the following in detail:  [] Stress test   [] Angiography   [] Echocardiogram   [x] Labs   [] Other:       Assessment:         ICD-10-CM ICD-9-CM   1. Raynaud's disease without gangrene I73.00 443.0   2. Non-rheumatic mitral regurgitation I34.0 424.0   3. Tobacco use Z72.0 305.1     Problem List Items Addressed This Visit        Cardiac/Vascular    Raynaud's disease without gangrene - Primary    Non-rheumatic mitral regurgitation       Other    Tobacco use    Relevant Orders    Ambulatory referral to Smoking Cessation Program           Plan:     ALL CV CLINICALLY STABLE, NO ANGINA, NO HF, NO TIA, NO CLINICAL ARRHYTHMIA,CONTINUE CURRENT MEDS, EDUCATION, DIET, EXERCISE, TOBACCO CESSATION, RTC IN 9 MO      Raynaud's disease without gangrene    Non-rheumatic mitral regurgitation    Tobacco use  -     Ambulatory referral to Smoking Cessation Program    Other orders  -     amLODIPine (NORVASC) 2.5 MG tablet; Take 0.5 tablets (1.25 mg total) by mouth once daily.  Dispense: 45 tablet; Refill: 2    RTC Low level/low impact aerobic exercise 5x's/wk. Heart healthy diet and risk factor modification.    See labs and med orders.    Aerobic exercise 5x's/wk. Heart healthy diet and risk factor modification.    See labs and med orders.

## 2018-08-07 ENCOUNTER — TELEPHONE (OUTPATIENT)
Dept: FAMILY MEDICINE | Facility: CLINIC | Age: 68
End: 2018-08-07

## 2018-10-05 ENCOUNTER — TELEPHONE (OUTPATIENT)
Dept: HEMATOLOGY/ONCOLOGY | Facility: CLINIC | Age: 68
End: 2018-10-05

## 2018-10-05 NOTE — TELEPHONE ENCOUNTER
Called and left messages for the patient 4 times and have heard nothing back. On 07/20/18 and letter was sent letting the patient know we were trying to reach her to schedule a new patient appointment.

## 2019-04-09 ENCOUNTER — HOSPITAL ENCOUNTER (OUTPATIENT)
Dept: RADIOLOGY | Facility: HOSPITAL | Age: 69
Discharge: HOME | End: 2019-04-09
Attending: SPECIALIST
Payer: MEDICARE

## 2019-04-09 DIAGNOSIS — Z78.0 ASYMPTOMATIC MENOPAUSAL STATE: ICD-10-CM

## 2019-04-09 PROCEDURE — 77080 DEXA BONE DENSITY SPINE HIP: ICD-10-PCS | Mod: 26,,, | Performed by: RADIOLOGY

## 2019-04-09 PROCEDURE — 77080 DXA BONE DENSITY AXIAL: CPT | Mod: TC,PO

## 2019-04-09 PROCEDURE — 77080 DXA BONE DENSITY AXIAL: CPT | Mod: 26,,, | Performed by: RADIOLOGY

## 2019-04-29 ENCOUNTER — OFFICE VISIT (OUTPATIENT)
Dept: CARDIOLOGY | Facility: CLINIC | Age: 69
End: 2019-04-29
Payer: MEDICARE

## 2019-04-29 VITALS
HEIGHT: 68 IN | WEIGHT: 164.44 LBS | DIASTOLIC BLOOD PRESSURE: 78 MMHG | BODY MASS INDEX: 24.92 KG/M2 | HEART RATE: 89 BPM | SYSTOLIC BLOOD PRESSURE: 138 MMHG

## 2019-04-29 DIAGNOSIS — I77.9 MILD CAROTID ARTERY DISEASE: ICD-10-CM

## 2019-04-29 DIAGNOSIS — I73.00 RAYNAUD'S DISEASE WITHOUT GANGRENE: Primary | ICD-10-CM

## 2019-04-29 DIAGNOSIS — I34.0 NON-RHEUMATIC MITRAL REGURGITATION: ICD-10-CM

## 2019-04-29 DIAGNOSIS — R03.0 ELEVATED BP WITHOUT DIAGNOSIS OF HYPERTENSION: ICD-10-CM

## 2019-04-29 DIAGNOSIS — I51.89 DIASTOLIC DYSFUNCTION: ICD-10-CM

## 2019-04-29 DIAGNOSIS — Z72.0 TOBACCO USE: ICD-10-CM

## 2019-04-29 PROCEDURE — 99214 OFFICE O/P EST MOD 30 MIN: CPT | Mod: PBBFAC,PO | Performed by: INTERNAL MEDICINE

## 2019-04-29 PROCEDURE — 99214 OFFICE O/P EST MOD 30 MIN: CPT | Mod: S$PBB,,, | Performed by: INTERNAL MEDICINE

## 2019-04-29 PROCEDURE — 99214 PR OFFICE/OUTPT VISIT, EST, LEVL IV, 30-39 MIN: ICD-10-PCS | Mod: S$PBB,,, | Performed by: INTERNAL MEDICINE

## 2019-04-29 PROCEDURE — 99999 PR PBB SHADOW E&M-EST. PATIENT-LVL IV: ICD-10-PCS | Mod: PBBFAC,,, | Performed by: INTERNAL MEDICINE

## 2019-04-29 PROCEDURE — 99999 PR PBB SHADOW E&M-EST. PATIENT-LVL IV: CPT | Mod: PBBFAC,,, | Performed by: INTERNAL MEDICINE

## 2019-04-29 RX ORDER — FEXOFENADINE HCL AND PSEUDOEPHEDRINE HCI 60; 120 MG/1; MG/1
0.5 TABLET, EXTENDED RELEASE ORAL DAILY
COMMUNITY
End: 2020-12-16

## 2019-04-29 RX ORDER — AMLODIPINE BESYLATE 2.5 MG/1
2.5 TABLET ORAL NIGHTLY
Qty: 90 TABLET | Refills: 2 | Status: SHIPPED | OUTPATIENT
Start: 2019-04-29 | End: 2020-02-14 | Stop reason: SDUPTHER

## 2019-04-29 NOTE — PROGRESS NOTES
Subjective:    Patient ID:  Bev Miguel is a 68 y.o. female who presents for Valvular Heart Disease        HPI  RECENT LIFELINE SCREENING, MILD CAROTID DISEASE, NO AAA, NORMAL PHAM, DOING OK, STILL SMOKES, MILD RAYNAUD'S SX,OUT OF NORVASC, LAST LDL 61, , BP UP,  SEE ROS    Past Medical History:   Diagnosis Date    Abnormal EKG     Allergy     perennial; sees Dr Jose ENT for injections    Asthma     mild interm    Breast CA     Cancer     ovarian    Chest pain     Chronic insomnia     Colonic polyp     Heart murmur     Hyperlipidemia     Hypoglycemia     Macrocytosis     Mild carotid artery disease 4/29/2019    Mitral valve regurgitation     Osteopenia     Ovarian cancer     SOB (shortness of breath)     Uterine cancer     Valvular regurgitation     Varicose vein of leg     Venous insufficiency     Vitamin C deficiency      Past Surgical History:   Procedure Laterality Date    BREAST SURGERY  05/28/2010    bilateral mastectomy/breast reconstruction; R breast ca.; no XRT/chemo. [facility] for 5 yrs.    OOPHORECTOMY      TC  02/20/2017    no polyps; GI/Dr March. 5 yr repeat.    TONSILLECTOMY      URETEROTOMY       Family History   Problem Relation Age of Onset    Early death Mother     Miscarriages / Stillbirths Mother     Cancer Father     Early death Father     Asthma Brother     Early death Brother      Social History     Socioeconomic History    Marital status:      Spouse name: Not on file    Number of children: Not on file    Years of education: Not on file    Highest education level: Not on file   Occupational History    Occupation: retired sales management   Social Needs    Financial resource strain: Not on file    Food insecurity:     Worry: Not on file     Inability: Not on file    Transportation needs:     Medical: Not on file     Non-medical: Not on file   Tobacco Use    Smoking status: Current Every Day Smoker     Packs/day: 0.30     Years: 45.00      Pack years: 13.50    Smokeless tobacco: Never Used   Substance and Sexual Activity    Alcohol use: Yes     Alcohol/week: 1.2 oz     Types: 2 Shots of liquor per week     Comment: 2 cocktails daily; wine 5 glasses a week.    Drug use: No    Sexual activity: Not on file   Lifestyle    Physical activity:     Days per week: Not on file     Minutes per session: Not on file    Stress: Not on file   Relationships    Social connections:     Talks on phone: Not on file     Gets together: Not on file     Attends Hindu service: Not on file     Active member of club or organization: Not on file     Attends meetings of clubs or organizations: Not on file     Relationship status: Not on file   Other Topics Concern    Not on file   Social History Narrative    Not on file       Review of patient's allergies indicates:   Allergen Reactions    Benadryl [diphenhydramine hcl]     Biaxin [clarithromycin]     Iodine and iodide containing products     Levaquin [levofloxacin]     Sulfa (sulfonamide antibiotics)     Trazodone     Zyban [bupropion hcl (smoking deter)]     Bactrim [sulfamethoxazole-trimethoprim] Rash       Current Outpatient Medications:     alendronate (FOSAMAX) 70 MG tablet, Take 1 tablet (70 mg total) by mouth every 7 days., Disp: 15 tablet, Rfl: 1    ascorbate calcium-bioflavonoid (LEIGHTON-C WITH BIOFLAVONOIDS) 500-200 mg Tab, Take 1 tablet by mouth once daily., Disp: , Rfl:     aspirin (ECOTRIN) 81 MG EC tablet, Take 81 mg by mouth once daily., Disp: , Rfl:     biotin 2,500 mcg Cap, Take 5,000 mg by mouth once daily., Disp: , Rfl:     CALCIUM CARB,CIT/D3/PHYTOSTROL (CITRACAL D + HEART HEALTH ORAL), Take 600 mg by mouth 2 (two) times daily., Disp: , Rfl:     cyanocobalamin (VITAMIN B-12) 1000 MCG tablet, Take 3,000 mcg by mouth once daily., Disp: , Rfl:     fexofenadine-pseudoephedrine  mg (ALLEGRA-D)  mg per tablet, Take 0.5 tablets by mouth once daily., Disp: , Rfl:      ibuprofen (ADVIL,MOTRIN) 200 MG tablet, Take 200 mg by mouth every 6 (six) hours as needed for Pain., Disp: , Rfl:     MULTIVIT-MIN/IRON/FOLIC/LUTEIN (CENTRUM SILVER WOMEN ORAL), Take 1 capsule by mouth once daily., Disp: , Rfl:     UNABLE TO FIND, Tumeric 500 mg 1 tablet daily, Disp: , Rfl:     vitamin D 1000 units Tab, Take 4,000 mg by mouth once daily., Disp: , Rfl:     vitamin E 400 UNIT capsule, Take 400 Units by mouth once daily., Disp: , Rfl:     amLODIPine (NORVASC) 2.5 MG tablet, Take 1 tablet (2.5 mg total) by mouth every evening., Disp: 90 tablet, Rfl: 2    hydrOXYzine HCl (ATARAX) 10 MG Tab, 10 mg every evening. , Disp: , Rfl:     montelukast (SINGULAIR) 10 mg tablet, Take 10 mg by mouth every evening., Disp: , Rfl:     Review of Systems   Constitution: Negative for chills, diaphoresis, malaise/fatigue and night sweats.   HENT: Negative for congestion.    Eyes: Negative for blurred vision and visual disturbance.   Cardiovascular: Negative for chest pain, claudication, cyanosis, dyspnea on exertion, irregular heartbeat, leg swelling, near-syncope, orthopnea, palpitations, paroxysmal nocturnal dyspnea and syncope.        RAYNUD'S SX IN TOES, SOME IN FINGERS MUCH BETTER WITH MEDS   Respiratory: Negative for cough, hemoptysis, shortness of breath and wheezing.    Endocrine: Negative for cold intolerance and heat intolerance.   Hematologic/Lymphatic: Negative for adenopathy. Does not bruise/bleed easily.   Skin: Negative for color change (BETTER), itching, nail changes and rash.   Musculoskeletal: Negative for back pain (CHRONIC) and falls.   Gastrointestinal: Negative for abdominal pain, dysphagia, hematemesis, jaundice and melena.   Genitourinary: Negative for dysuria, flank pain and frequency.   Neurological: Negative for brief paralysis, dizziness, focal weakness, light-headedness, loss of balance, numbness, tremors and weakness.   Psychiatric/Behavioral: Negative for altered mental status and  "substance abuse. The patient is not nervous/anxious.    Allergic/Immunologic: Negative for persistent infections.        Objective:      Vitals:    04/29/19 1024   BP: 138/78   Pulse: 89   Weight: 74.6 kg (164 lb 7.4 oz)   Height: 5' 7.5" (1.715 m)   PainSc: 0-No pain     Body mass index is 25.38 kg/m².    Physical Exam   Constitutional: She is oriented to person, place, and time. She appears well-developed and well-nourished.   HENT:   Head: Normocephalic and atraumatic.   Mouth/Throat: Oropharynx is clear and moist and mucous membranes are normal.   Eyes: Pupils are equal, round, and reactive to light. Conjunctivae and EOM are normal.   Neck: Normal range of motion. Neck supple. Normal carotid pulses, no hepatojugular reflux and no JVD present. Carotid bruit is not present. No thyromegaly present.   Cardiovascular: Normal rate and regular rhythm. Exam reveals no gallop and no friction rub.   Murmur heard.   Systolic murmur is present with a grade of 1/6 at the lower left sternal border and apex.  Pulses:       Carotid pulses are 2+ on the right side, and 2+ on the left side.       Radial pulses are 2+ on the right side, and 2+ on the left side.        Femoral pulses are 2+ on the right side, and 2+ on the left side.       Dorsalis pedis pulses are 2+ on the right side, and 2+ on the left side.        Posterior tibial pulses are 2+ on the right side, and 2+ on the left side.   OK DECREASED CAPILLARY REFILL   Pulmonary/Chest: Effort normal and breath sounds normal. She has no wheezes. She has no rales. She exhibits no tenderness.   Abdominal: Soft. Bowel sounds are normal. She exhibits no distension and no mass. There is no hepatosplenomegaly. There is no CVA tenderness.   Musculoskeletal: She exhibits no edema or tenderness.   Lymphadenopathy:     She has no cervical adenopathy.   Neurological: She is alert and oriented to person, place, and time. She has normal strength. She displays no tremor. No cranial nerve " deficit.   Skin: Skin is warm and dry. No rash noted. No erythema.   Psychiatric: She has a normal mood and affect. Her behavior is normal.               ..    Chemistry        Component Value Date/Time     04/05/2018 0858    K 4.8 04/05/2018 0858     04/05/2018 0858    CO2 29 04/05/2018 0858    BUN 17 04/05/2018 0858    CREATININE 0.60 04/05/2018 0858    GLU 91 04/05/2018 0858        Component Value Date/Time    CALCIUM 9.2 04/05/2018 0858    ALKPHOS 77 04/05/2018 0858    AST 30 04/05/2018 0858    AST 27 03/14/2016 1523    ALT 34 04/05/2018 0858    BILITOT 0.5 04/05/2018 0858    ESTGFRAFRICA >60 04/05/2018 0858    EGFRNONAA >60 04/05/2018 0858            ..  Lab Results   Component Value Date    CHOL 177 04/05/2018    CHOL 165 09/18/2017    CHOL 185 03/09/2016     Lab Results   Component Value Date     (H) 04/05/2018    HDL 92 (H) 09/18/2017    HDL 93 (H) 03/09/2016     Lab Results   Component Value Date    LDLCALC 61.2 (L) 04/05/2018    LDLCALC 61.8 (L) 09/18/2017    LDLCALC 81.4 03/09/2016     Lab Results   Component Value Date    TRIG 74 04/05/2018    TRIG 56 09/18/2017    TRIG 53 03/09/2016     Lab Results   Component Value Date    CHOLHDL 57.1 (H) 04/05/2018    CHOLHDL 55.8 (H) 09/18/2017    CHOLHDL 50.3 (H) 03/09/2016     ..  Lab Results   Component Value Date    WBC 5.45 04/05/2018    HGB 12.7 04/05/2018    HCT 39.0 04/05/2018     (H) 04/05/2018     04/05/2018       Test(s) Reviewed  I have reviewed the following in detail:  [] Stress test   [] Angiography   [] Echocardiogram   [] Labs   [] Other:       Assessment:         ICD-10-CM ICD-9-CM   1. Raynaud's disease without gangrene I73.00 443.0   2. Non-rheumatic mitral regurgitation I34.0 424.0   3. Elevated BP without diagnosis of hypertension R03.0 796.2   4. Tobacco use Z72.0 305.1   5. Diastolic dysfunction I51.9 429.9   6. Mild carotid artery disease I77.9 447.9     Problem List Items Addressed This Visit         Cardiac/Vascular    Raynaud's disease without gangrene - Primary    Non-rheumatic mitral regurgitation    Diastolic dysfunction    Elevated BP without diagnosis of hypertension    Mild carotid artery disease       Other    Tobacco use    Relevant Orders    Ambulatory referral to Smoking Cessation Program           Plan:         INCREASE NORVASC TO 2.5 MG, WATCH BP, TOBACCO CESSATION, LIPIDS EXCELLENT , NO MEDS, ALL CV CLINICALLY STABLE, NO ANGINA, NO HF, NO TIA, NO CLINICAL ARRHYTHMIA,CONTINUE CURRENT MEDS, EDUCATION, DIET, EXERCISE, RTC IN 9 MO  Raynaud's disease without gangrene    Non-rheumatic mitral regurgitation    Elevated BP without diagnosis of hypertension    Tobacco use  -     Ambulatory referral to Smoking Cessation Program    Diastolic dysfunction    Mild carotid artery disease    Other orders  -     amLODIPine (NORVASC) 2.5 MG tablet; Take 1 tablet (2.5 mg total) by mouth every evening.  Dispense: 90 tablet; Refill: 2    RTC Low level/low impact aerobic exercise 5x's/wk. Heart healthy diet and risk factor modification.    See labs and med orders.    Aerobic exercise 5x's/wk. Heart healthy diet and risk factor modification.    See labs and med orders.

## 2019-06-19 ENCOUNTER — PATIENT OUTREACH (OUTPATIENT)
Dept: ADMINISTRATIVE | Facility: HOSPITAL | Age: 69
End: 2019-06-19

## 2019-07-03 ENCOUNTER — OFFICE VISIT (OUTPATIENT)
Dept: FAMILY MEDICINE | Facility: CLINIC | Age: 69
End: 2019-07-03
Payer: MEDICARE

## 2019-07-03 VITALS
HEART RATE: 89 BPM | WEIGHT: 165.13 LBS | HEIGHT: 68 IN | OXYGEN SATURATION: 97 % | DIASTOLIC BLOOD PRESSURE: 82 MMHG | SYSTOLIC BLOOD PRESSURE: 128 MMHG | BODY MASS INDEX: 25.03 KG/M2 | TEMPERATURE: 98 F

## 2019-07-03 DIAGNOSIS — I77.9 MILD CAROTID ARTERY DISEASE: Primary | ICD-10-CM

## 2019-07-03 DIAGNOSIS — K63.5 MULTIPLE BENIGN POLYPS OF LARGE INTESTINE: ICD-10-CM

## 2019-07-03 DIAGNOSIS — Z23 NEED FOR VACCINATION: ICD-10-CM

## 2019-07-03 DIAGNOSIS — E78.2 MIXED HYPERLIPIDEMIA: ICD-10-CM

## 2019-07-03 DIAGNOSIS — I73.00 RAYNAUD'S DISEASE WITHOUT GANGRENE: ICD-10-CM

## 2019-07-03 PROCEDURE — 99204 OFFICE O/P NEW MOD 45 MIN: CPT | Mod: S$PBB,,, | Performed by: FAMILY MEDICINE

## 2019-07-03 PROCEDURE — G0009 ADMIN PNEUMOCOCCAL VACCINE: HCPCS | Mod: PBBFAC,PO

## 2019-07-03 PROCEDURE — 99204 PR OFFICE/OUTPT VISIT, NEW, LEVL IV, 45-59 MIN: ICD-10-PCS | Mod: S$PBB,,, | Performed by: FAMILY MEDICINE

## 2019-07-03 PROCEDURE — 99999 PR PBB SHADOW E&M-EST. PATIENT-LVL III: ICD-10-PCS | Mod: PBBFAC,,, | Performed by: FAMILY MEDICINE

## 2019-07-03 PROCEDURE — 99213 OFFICE O/P EST LOW 20 MIN: CPT | Mod: PBBFAC,PO | Performed by: FAMILY MEDICINE

## 2019-07-03 PROCEDURE — 99999 PR PBB SHADOW E&M-EST. PATIENT-LVL III: CPT | Mod: PBBFAC,,, | Performed by: FAMILY MEDICINE

## 2019-07-03 NOTE — PROGRESS NOTES
"Subjective:       Patient ID: Bev Miguel is a 68 y.o. female.    Chief Complaint: Establish Care    This pt is new to me and to this clinic.  Pt is here for followup of chronic medical issues.  She is followed by Dr. Mae for Raynaud's and mitral regurg.  The pt is doing well with no acute complaints.    Review of Systems   Constitutional: Negative for activity change, appetite change, fatigue and unexpected weight change.   Eyes: Negative for visual disturbance.   Respiratory: Negative for cough, chest tightness and shortness of breath.    Cardiovascular: Negative for chest pain, palpitations and leg swelling.   Gastrointestinal: Negative for abdominal pain, constipation, diarrhea, nausea and vomiting.   Endocrine: Negative for cold intolerance, heat intolerance and polyuria.   Genitourinary: Negative for decreased urine volume and dysuria.   Musculoskeletal: Negative for arthralgias and back pain.   Skin: Negative for rash.   Neurological: Negative for numbness and headaches.   Psychiatric/Behavioral: Negative for dysphoric mood and sleep disturbance. The patient is not nervous/anxious.        Objective:       Vitals:    07/03/19 1445   BP: 128/82   Pulse: 89   Temp: 98.3 °F (36.8 °C)   TempSrc: Oral   SpO2: 97%   Weight: 74.9 kg (165 lb 2 oz)   Height: 5' 7.5" (1.715 m)     Physical Exam   Constitutional: She is oriented to person, place, and time. She appears well-developed and well-nourished.   HENT:   Head: Normocephalic.   Eyes: Pupils are equal, round, and reactive to light. Conjunctivae and EOM are normal.   Neck: Normal range of motion. Neck supple. No thyromegaly present.   Cardiovascular: Normal rate, regular rhythm and normal heart sounds.   Pulmonary/Chest: Effort normal and breath sounds normal.   Abdominal: Soft. Bowel sounds are normal. There is no tenderness.   Musculoskeletal: Normal range of motion. She exhibits no tenderness or deformity.   Lymphadenopathy:     She has no cervical " adenopathy.   Neurological: She is alert and oriented to person, place, and time. She displays normal reflexes. No cranial nerve deficit. She exhibits normal muscle tone. Coordination normal.   Skin: Skin is warm and dry.   Psychiatric: She has a normal mood and affect. Her behavior is normal.       Assessment:       1. Mild carotid artery disease    2. Multiple benign polyps of large intestine    3. Raynaud's disease without gangrene    4. Need for vaccination    5. Mixed hyperlipidemia        Plan:       Bev was seen today for establish care.    Diagnoses and all orders for this visit:    Mild carotid artery disease  -     Lipid panel; Future    Multiple benign polyps of large intestine    Raynaud's disease without gangrene  -     Comprehensive metabolic panel; Future    Need for vaccination  -     (In Office Administered) Pneumococcal Conjugate Vaccine (13 Valent) (IM)    Mixed hyperlipidemia  -     Lipid panel; Future      During this visit, I reviewed the pt's history, medications, allergies, and problem list.

## 2019-07-09 ENCOUNTER — LAB VISIT (OUTPATIENT)
Dept: LAB | Facility: HOSPITAL | Age: 69
End: 2019-07-09
Attending: FAMILY MEDICINE
Payer: MEDICARE

## 2019-07-09 DIAGNOSIS — I77.9 MILD CAROTID ARTERY DISEASE: ICD-10-CM

## 2019-07-09 DIAGNOSIS — I73.00 RAYNAUD'S DISEASE WITHOUT GANGRENE: ICD-10-CM

## 2019-07-09 DIAGNOSIS — E78.2 MIXED HYPERLIPIDEMIA: ICD-10-CM

## 2019-07-09 LAB
ALBUMIN SERPL BCP-MCNC: 3.8 G/DL (ref 3.5–5.2)
ALP SERPL-CCNC: 90 U/L (ref 55–135)
ALT SERPL W/O P-5'-P-CCNC: 16 U/L (ref 10–44)
ANION GAP SERPL CALC-SCNC: 8 MMOL/L (ref 8–16)
AST SERPL-CCNC: 22 U/L (ref 10–40)
BILIRUB SERPL-MCNC: 0.6 MG/DL (ref 0.1–1)
BUN SERPL-MCNC: 20 MG/DL (ref 8–23)
CALCIUM SERPL-MCNC: 9.7 MG/DL (ref 8.7–10.5)
CHLORIDE SERPL-SCNC: 104 MMOL/L (ref 95–110)
CHOLEST SERPL-MCNC: 213 MG/DL (ref 120–199)
CHOLEST/HDLC SERPL: 2.2 {RATIO} (ref 2–5)
CO2 SERPL-SCNC: 28 MMOL/L (ref 23–29)
CREAT SERPL-MCNC: 0.8 MG/DL (ref 0.5–1.4)
EST. GFR  (AFRICAN AMERICAN): >60 ML/MIN/1.73 M^2
EST. GFR  (NON AFRICAN AMERICAN): >60 ML/MIN/1.73 M^2
GLUCOSE SERPL-MCNC: 96 MG/DL (ref 70–110)
HDLC SERPL-MCNC: 97 MG/DL (ref 40–75)
HDLC SERPL: 45.5 % (ref 20–50)
LDLC SERPL CALC-MCNC: 106 MG/DL (ref 63–159)
NONHDLC SERPL-MCNC: 116 MG/DL
POTASSIUM SERPL-SCNC: 4.9 MMOL/L (ref 3.5–5.1)
PROT SERPL-MCNC: 7 G/DL (ref 6–8.4)
SODIUM SERPL-SCNC: 140 MMOL/L (ref 136–145)
TRIGL SERPL-MCNC: 50 MG/DL (ref 30–150)

## 2019-07-09 PROCEDURE — 36415 COLL VENOUS BLD VENIPUNCTURE: CPT | Mod: PO

## 2019-07-09 PROCEDURE — 80061 LIPID PANEL: CPT

## 2019-07-09 PROCEDURE — 80053 COMPREHEN METABOLIC PANEL: CPT

## 2020-02-12 ENCOUNTER — PATIENT OUTREACH (OUTPATIENT)
Dept: ADMINISTRATIVE | Facility: OTHER | Age: 70
End: 2020-02-12

## 2020-02-14 ENCOUNTER — OFFICE VISIT (OUTPATIENT)
Dept: CARDIOLOGY | Facility: CLINIC | Age: 70
End: 2020-02-14
Payer: MEDICARE

## 2020-02-14 VITALS
HEIGHT: 68 IN | WEIGHT: 163.13 LBS | DIASTOLIC BLOOD PRESSURE: 78 MMHG | HEART RATE: 94 BPM | BODY MASS INDEX: 24.72 KG/M2 | SYSTOLIC BLOOD PRESSURE: 139 MMHG

## 2020-02-14 DIAGNOSIS — I34.0 NON-RHEUMATIC MITRAL REGURGITATION: ICD-10-CM

## 2020-02-14 DIAGNOSIS — I51.89 DIASTOLIC DYSFUNCTION: ICD-10-CM

## 2020-02-14 DIAGNOSIS — Z71.6 ENCOUNTER FOR TOBACCO USE CESSATION COUNSELING: ICD-10-CM

## 2020-02-14 DIAGNOSIS — R09.89 ARTERIAL BRUIT: ICD-10-CM

## 2020-02-14 DIAGNOSIS — R03.0 ELEVATED BP WITHOUT DIAGNOSIS OF HYPERTENSION: ICD-10-CM

## 2020-02-14 DIAGNOSIS — I73.00 RAYNAUD'S DISEASE WITHOUT GANGRENE: Primary | ICD-10-CM

## 2020-02-14 PROCEDURE — 99214 OFFICE O/P EST MOD 30 MIN: CPT | Mod: PBBFAC,PO | Performed by: INTERNAL MEDICINE

## 2020-02-14 PROCEDURE — 99999 PR PBB SHADOW E&M-EST. PATIENT-LVL IV: CPT | Mod: PBBFAC,,, | Performed by: INTERNAL MEDICINE

## 2020-02-14 PROCEDURE — 99214 OFFICE O/P EST MOD 30 MIN: CPT | Mod: S$PBB,,, | Performed by: INTERNAL MEDICINE

## 2020-02-14 PROCEDURE — 99999 PR PBB SHADOW E&M-EST. PATIENT-LVL IV: ICD-10-PCS | Mod: PBBFAC,,, | Performed by: INTERNAL MEDICINE

## 2020-02-14 PROCEDURE — 99214 PR OFFICE/OUTPT VISIT, EST, LEVL IV, 30-39 MIN: ICD-10-PCS | Mod: S$PBB,,, | Performed by: INTERNAL MEDICINE

## 2020-02-14 RX ORDER — HYDROXYZINE HYDROCHLORIDE 10 MG/1
TABLET, FILM COATED ORAL
COMMUNITY
Start: 2020-01-30 | End: 2022-03-21

## 2020-02-14 RX ORDER — AMLODIPINE BESYLATE 2.5 MG/1
2.5 TABLET ORAL NIGHTLY
Qty: 90 TABLET | Refills: 2 | Status: SHIPPED | OUTPATIENT
Start: 2020-02-14 | End: 2020-02-20 | Stop reason: SDUPTHER

## 2020-02-14 NOTE — PROGRESS NOTES
Subjective:    Patient ID:  Bev Miguel is a 69 y.o. female who presents for Hyperlipidemia and Raynaud Disease        HPI    DISCUSSED LABS AND GOALS, , HDL 97, HAD R HIP INJECTIONS,RAYNAUD'S SX STABLE,  STILL SMOKES SOME, NO CHEST PAIN, NO SHORTNESS OF BREATH, NO TIA TYPE SYMPTOMS, NO NEAR-SYNCOPE, SEE ROS  Past Medical History:   Diagnosis Date    Abnormal EKG     Allergy     perennial; sees Dr Jose ENT for injections    Asthma     mild interm    Breast CA     Cancer     ovarian    Chest pain     Chronic insomnia     Colonic polyp     Heart murmur     Hyperlipidemia     Hypoglycemia     Macrocytosis     Mild carotid artery disease 4/29/2019    Mitral valve regurgitation     Osteopenia     Ovarian cancer     SOB (shortness of breath)     Uterine cancer     Valvular regurgitation     Varicose vein of leg     Venous insufficiency     Vitamin C deficiency      Past Surgical History:   Procedure Laterality Date    BREAST SURGERY  05/28/2010    bilateral mastectomy/breast reconstruction; R breast ca.; no XRT/chemo. [facility] for 5 yrs.    OOPHORECTOMY      TC  02/20/2017    no polyps; GI/Dr March. 5 yr repeat.    TONSILLECTOMY      URETEROTOMY       Family History   Problem Relation Age of Onset    Early death Mother     Miscarriages / Stillbirths Mother     Cancer Father     Early death Father     Asthma Brother     Early death Brother      Social History     Socioeconomic History    Marital status:      Spouse name: Not on file    Number of children: Not on file    Years of education: Not on file    Highest education level: Not on file   Occupational History    Occupation: retired sales management   Social Needs    Financial resource strain: Not on file    Food insecurity:     Worry: Not on file     Inability: Not on file    Transportation needs:     Medical: Not on file     Non-medical: Not on file   Tobacco Use    Smoking status: Current Every Day  Smoker     Packs/day: 0.30     Years: 45.00     Pack years: 13.50    Smokeless tobacco: Never Used   Substance and Sexual Activity    Alcohol use: Yes     Alcohol/week: 2.0 standard drinks     Types: 2 Shots of liquor per week     Comment: 2 cocktails daily; wine 5 glasses a week.    Drug use: No    Sexual activity: Not on file   Lifestyle    Physical activity:     Days per week: Not on file     Minutes per session: Not on file    Stress: Not on file   Relationships    Social connections:     Talks on phone: Not on file     Gets together: Not on file     Attends [employer] service: Not on file     Active member of club or organization: Not on file     Attends meetings of clubs or organizations: Not on file     Relationship status: Not on file   Other Topics Concern    Not on file   Social History Narrative    Not on file       Review of patient's allergies indicates:   Allergen Reactions    Benadryl [diphenhydramine hcl]     Biaxin [clarithromycin]     Iodine and iodide containing products     Levaquin [levofloxacin]     Sulfa (sulfonamide antibiotics)     Trazodone     Zyban [bupropion hcl (smoking deter)]     Bactrim [sulfamethoxazole-trimethoprim] Rash       Current Outpatient Medications:     alendronate (FOSAMAX) 70 MG tablet, Take 1 tablet (70 mg total) by mouth every 7 days., Disp: 15 tablet, Rfl: 1    amLODIPine (NORVASC) 2.5 MG tablet, Take 1 tablet (2.5 mg total) by mouth every evening., Disp: 90 tablet, Rfl: 2    ascorbate calcium-bioflavonoid (LEIGHTON-C WITH BIOFLAVONOIDS) 500-200 mg Tab, Take 1 tablet by mouth once daily., Disp: , Rfl:     aspirin (ECOTRIN) 81 MG EC tablet, Take 81 mg by mouth once daily., Disp: , Rfl:     biotin 2,500 mcg Cap, Take 5,000 mg by mouth once daily., Disp: , Rfl:     CALCIUM CARB,CIT/D3/PHYTOSTROL (CITRACAL D + HEART HEALTH ORAL), Take 600 mg by mouth 2 (two) times daily., Disp: , Rfl:     cyanocobalamin (VITAMIN B-12) 1000 MCG tablet, Take 3,000 mcg by  mouth once daily., Disp: , Rfl:     fexofenadine-pseudoephedrine  mg (ALLEGRA-D)  mg per tablet, Take 0.5 tablets by mouth once daily., Disp: , Rfl:     FLUZONE HIGH-DOSE 2019-20, PF, 180 mcg/0.5 mL Syrg, ADMINISTER 0.5ML IN THE MUSCLE AS DIRECTED, Disp: , Rfl: 0    hydrOXYzine HCl (ATARAX) 10 MG Tab, , Disp: , Rfl:     ibuprofen (ADVIL,MOTRIN) 200 MG tablet, Take 200 mg by mouth every 6 (six) hours as needed for Pain., Disp: , Rfl:     Lactobacillus rhamnosus GG (CULTURELLE) 10 billion cell capsule, Take 1 capsule by mouth once daily., Disp: , Rfl:     MULTIVIT-MIN/IRON/FOLIC/LUTEIN (CENTRUM SILVER WOMEN ORAL), Take 1 capsule by mouth once daily., Disp: , Rfl:     UNABLE TO FIND, Tumeric 500 mg 1 tablet daily, Disp: , Rfl:     vitamin D 1000 units Tab, Take 4,000 mg by mouth once daily., Disp: , Rfl:     vitamin E 400 UNIT capsule, Take 1,000 Units by mouth once daily. , Disp: , Rfl:     Review of Systems   Constitution: Negative for chills, diaphoresis, malaise/fatigue and night sweats.   HENT: Negative for congestion.    Eyes: Negative for blurred vision and visual disturbance.   Cardiovascular: Negative for chest pain, claudication, cyanosis, dyspnea on exertion, irregular heartbeat, leg swelling, near-syncope, orthopnea, palpitations, paroxysmal nocturnal dyspnea and syncope.        RAYNUD'S SX IN TOES, SOME IN FINGERS MUCH BETTER WITH MEDS   Respiratory: Negative for cough, hemoptysis, shortness of breath and wheezing.    Endocrine: Negative for cold intolerance and heat intolerance.   Hematologic/Lymphatic: Negative for adenopathy. Does not bruise/bleed easily.   Skin: Negative for color change (BETTER), itching, nail changes and rash.   Musculoskeletal: Negative for back pain (CHRONIC) and falls. Joint pain: R HIP.   Gastrointestinal: Negative for abdominal pain, dysphagia, hematemesis, jaundice and melena.   Genitourinary: Negative for dysuria, flank pain and frequency.   Neurological:  "Negative for brief paralysis, dizziness, focal weakness, light-headedness, loss of balance, numbness, tremors and weakness.   Psychiatric/Behavioral: Negative for altered mental status and substance abuse. The patient is not nervous/anxious.    Allergic/Immunologic: Negative for hives and persistent infections.        Objective:      Vitals:    02/14/20 0813   BP: 139/78   Pulse: 94   Weight: 74 kg (163 lb 2.3 oz)   Height: 5' 7.5" (1.715 m)   PainSc: 0-No pain     Body mass index is 25.17 kg/m².    Physical Exam   Constitutional: She is oriented to person, place, and time. She appears well-developed and well-nourished.   HENT:   Head: Normocephalic and atraumatic.   Mouth/Throat: Oropharynx is clear and moist and mucous membranes are normal.   Eyes: Pupils are equal, round, and reactive to light. Conjunctivae and EOM are normal.   Neck: Normal range of motion. Neck supple. Normal carotid pulses, no hepatojugular reflux and no JVD present. No thyromegaly present.   Cardiovascular: Normal rate and regular rhythm. Exam reveals no gallop and no friction rub.   Murmur heard.   Systolic murmur is present with a grade of 1/6 at the lower left sternal border and apex.  Pulses:       Carotid pulses are 2+ on the right side with bruit, and 2+ on the left side.       Radial pulses are 2+ on the right side, and 2+ on the left side.        Femoral pulses are 2+ on the right side, and 2+ on the left side.       Dorsalis pedis pulses are 2+ on the right side, and 2+ on the left side.        Posterior tibial pulses are 2+ on the right side, and 2+ on the left side.   OK DECREASED CAPILLARY REFILL   Pulmonary/Chest: Effort normal and breath sounds normal. She has no wheezes. She has no rales. She exhibits no tenderness.   Abdominal: Soft. Bowel sounds are normal. She exhibits no distension and no mass. There is no hepatosplenomegaly. There is no CVA tenderness.   Musculoskeletal: She exhibits no edema or tenderness. "   Lymphadenopathy:     She has no cervical adenopathy.   Neurological: She is alert and oriented to person, place, and time. She has normal strength. She displays no tremor. No cranial nerve deficit.   Skin: Skin is warm and dry. No rash noted. No erythema.   Psychiatric: She has a normal mood and affect. Her behavior is normal.               ..    Chemistry        Component Value Date/Time     07/09/2019 0850    K 4.9 07/09/2019 0850     07/09/2019 0850    CO2 28 07/09/2019 0850    BUN 20 07/09/2019 0850    CREATININE 0.8 07/09/2019 0850    GLU 96 07/09/2019 0850        Component Value Date/Time    CALCIUM 9.7 07/09/2019 0850    ALKPHOS 90 07/09/2019 0850    AST 22 07/09/2019 0850    AST 27 03/14/2016 1523    ALT 16 07/09/2019 0850    BILITOT 0.6 07/09/2019 0850    ESTGFRAFRICA >60.0 07/09/2019 0850    EGFRNONAA >60.0 07/09/2019 0850            ..  Lab Results   Component Value Date    CHOL 213 (H) 07/09/2019    CHOL 177 04/05/2018    CHOL 165 09/18/2017     Lab Results   Component Value Date    HDL 97 (H) 07/09/2019     (H) 04/05/2018    HDL 92 (H) 09/18/2017     Lab Results   Component Value Date    LDLCALC 106.0 07/09/2019    LDLCALC 61.2 (L) 04/05/2018    LDLCALC 61.8 (L) 09/18/2017     Lab Results   Component Value Date    TRIG 50 07/09/2019    TRIG 74 04/05/2018    TRIG 56 09/18/2017     Lab Results   Component Value Date    CHOLHDL 45.5 07/09/2019    CHOLHDL 57.1 (H) 04/05/2018    CHOLHDL 55.8 (H) 09/18/2017     ..  Lab Results   Component Value Date    WBC 5.45 04/05/2018    HGB 12.7 04/05/2018    HCT 39.0 04/05/2018     (H) 04/05/2018     12/02/2019       Test(s) Reviewed  I have reviewed the following in detail:  [] Stress test   [] Angiography   [] Echocardiogram   [x] Labs   [] Other:       Assessment:         ICD-10-CM ICD-9-CM   1. Raynaud's disease without gangrene I73.00 443.0   2. Non-rheumatic mitral regurgitation I34.0 424.0   3. Arterial bruit R09.89 785.9   4.  Elevated BP without diagnosis of hypertension R03.0 796.2   5. Diastolic dysfunction I51.89 429.9   6. Encounter for tobacco use cessation counseling Z71.6 V65.42     Problem List Items Addressed This Visit        Cardiac/Vascular    Raynaud's disease without gangrene - Primary    Relevant Orders    CV US Ankle Brachial Indices Ext Ltd WO Stress    Non-rheumatic mitral regurgitation    Relevant Orders    CV US Ankle Brachial Indices Ext Ltd WO Stress    Diastolic dysfunction    Relevant Orders    CV US Ankle Brachial Indices Ext Ltd WO Stress    Elevated BP without diagnosis of hypertension    Relevant Orders    CV US Ankle Brachial Indices Ext Ltd WO Stress       Other    Encounter for tobacco use cessation counseling    Relevant Orders    Ambulatory referral/consult to Smoking Cessation Program    CV US Ankle Brachial Indices Ext Ltd WO Stress    Arterial bruit    Relevant Orders    CV Ultrasound Bilateral Doppler Carotid    CV US Ankle Brachial Indices Ext Ltd WO Stress           Plan:         TOBACCO CESSATION COUNSELING X PLACEMENT RELATION TO RAYNAUD'S,,DAILY MEDS, WATCH BP, CHECK PHAM AND CAROTID ULTRASOUND ALL OTHER CV CLINICALLY STABLE, NO ANGINA, NO HF, NO TIA, NO CLINICAL ARRHYTHMIA,CONTINUE CURRENT MEDS, EDUCATION, DIET, EXERCISE, RETURN TO CLINIC IN 9 MONTHS   Raynaud's disease without gangrene  -     CV US Ankle Brachial Indices Ext Ltd WO Stress; Future    Non-rheumatic mitral regurgitation  -     CV US Ankle Brachial Indices Ext Ltd WO Stress; Future    Arterial bruit  -     CV Ultrasound Bilateral Doppler Carotid; Future  -     CV US Ankle Brachial Indices Ext Ltd WO Stress; Future    Elevated BP without diagnosis of hypertension  -     CV US Ankle Brachial Indices Ext Ltd WO Stress; Future    Diastolic dysfunction  -     CV US Ankle Brachial Indices Ext Ltd WO Stress; Future    Encounter for tobacco use cessation counseling  -     Ambulatory referral/consult to Smoking Cessation Program; Future;  Expected date: 02/21/2020  -     CV US Ankle Brachial Indices Ext Ltd WO Stress; Future    Other orders  -     amLODIPine (NORVASC) 2.5 MG tablet; Take 1 tablet (2.5 mg total) by mouth every evening.  Dispense: 90 tablet; Refill: 2    RTC Low level/low impact aerobic exercise 5x's/wk. Heart healthy diet and risk factor modification.    See labs and med orders.    Aerobic exercise 5x's/wk. Heart healthy diet and risk factor modification.    See labs and med orders.

## 2020-02-20 DIAGNOSIS — R03.0 ELEVATED BP WITHOUT DIAGNOSIS OF HYPERTENSION: Primary | ICD-10-CM

## 2020-02-20 RX ORDER — AMLODIPINE BESYLATE 2.5 MG/1
2.5 TABLET ORAL NIGHTLY
Qty: 90 TABLET | Refills: 2 | Status: SHIPPED | OUTPATIENT
Start: 2020-02-20 | End: 2020-12-07 | Stop reason: SDUPTHER

## 2020-03-10 ENCOUNTER — CLINICAL SUPPORT (OUTPATIENT)
Dept: CARDIOLOGY | Facility: CLINIC | Age: 70
End: 2020-03-10
Attending: INTERNAL MEDICINE
Payer: MEDICARE

## 2020-03-10 DIAGNOSIS — R03.0 ELEVATED BP WITHOUT DIAGNOSIS OF HYPERTENSION: ICD-10-CM

## 2020-03-10 DIAGNOSIS — I51.89 DIASTOLIC DYSFUNCTION: ICD-10-CM

## 2020-03-10 DIAGNOSIS — Z71.6 ENCOUNTER FOR TOBACCO USE CESSATION COUNSELING: ICD-10-CM

## 2020-03-10 DIAGNOSIS — R09.89 ARTERIAL BRUIT: ICD-10-CM

## 2020-03-10 DIAGNOSIS — I34.0 NON-RHEUMATIC MITRAL REGURGITATION: ICD-10-CM

## 2020-03-10 DIAGNOSIS — I73.00 RAYNAUD'S DISEASE WITHOUT GANGRENE: ICD-10-CM

## 2020-03-10 LAB
LEFT ABI: 1.16
LEFT ARM BP: 127 MMHG
LEFT ARM DIASTOLIC BLOOD PRESSURE: 70 MMHG
LEFT ARM SYSTOLIC BLOOD PRESSURE: 127 MMHG
LEFT CBA DIAS: 21 CM/S
LEFT CBA SYS: 65 CM/S
LEFT CCA DIST DIAS: 23 CM/S
LEFT CCA DIST SYS: 79 CM/S
LEFT CCA MID DIAS: 19 CM/S
LEFT CCA MID SYS: 85 CM/S
LEFT CCA PROX DIAS: 14 CM/S
LEFT CCA PROX SYS: 75 CM/S
LEFT DORSALIS PEDIS: 146 MMHG
LEFT ECA DIAS: 14 CM/S
LEFT ECA SYS: 86 CM/S
LEFT ICA DIST DIAS: 20 CM/S
LEFT ICA DIST SYS: 60 CM/S
LEFT ICA MID DIAS: 34 CM/S
LEFT ICA MID SYS: 105 CM/S
LEFT ICA PROX DIAS: 18 CM/S
LEFT ICA PROX SYS: 78 CM/S
LEFT POSTERIOR TIBIAL: 153 MMHG
LEFT VERTEBRAL DIAS: 14 CM/S
LEFT VERTEBRAL SYS: 53 CM/S
OHS CV CAROTID RIGHT ICA EDV HIGHEST: 30
OHS CV CAROTID ULTRASOUND LEFT ICA/CCA RATIO: 1.24
OHS CV CAROTID ULTRASOUND RIGHT ICA/CCA RATIO: 1.06
OHS CV PV CAROTID LEFT HIGHEST CCA: 85
OHS CV PV CAROTID LEFT HIGHEST ICA: 105
OHS CV PV CAROTID RIGHT HIGHEST CCA: 87
OHS CV PV CAROTID RIGHT HIGHEST ICA: 92
OHS CV US CAROTID LEFT HIGHEST EDV: 34
RIGHT ABI: 1.14
RIGHT ARM BP: 132 MMHG
RIGHT ARM DIASTOLIC BLOOD PRESSURE: 70 MMHG
RIGHT ARM SYSTOLIC BLOOD PRESSURE: 132 MMHG
RIGHT CBA DIAS: 21 CM/S
RIGHT CBA SYS: 77 CM/S
RIGHT CCA DIST DIAS: 21 CM/S
RIGHT CCA DIST SYS: 81 CM/S
RIGHT CCA MID DIAS: 25 CM/S
RIGHT CCA MID SYS: 87 CM/S
RIGHT CCA PROX DIAS: 16 CM/S
RIGHT CCA PROX SYS: 74 CM/S
RIGHT DORSALIS PEDIS: 150 MMHG
RIGHT ECA DIAS: 12 CM/S
RIGHT ECA SYS: 82 CM/S
RIGHT ICA DIST DIAS: 21 CM/S
RIGHT ICA DIST SYS: 87 CM/S
RIGHT ICA MID DIAS: 30 CM/S
RIGHT ICA MID SYS: 92 CM/S
RIGHT ICA PROX DIAS: 20 CM/S
RIGHT ICA PROX SYS: 80 CM/S
RIGHT POSTERIOR TIBIAL: 140 MMHG
RIGHT VERTEBRAL DIAS: 15 CM/S
RIGHT VERTEBRAL SYS: 51 CM/S

## 2020-03-10 PROCEDURE — 93880 CV US DOPPLER CAROTID (CUPID ONLY): ICD-10-PCS | Mod: 26,S$PBB,, | Performed by: INTERNAL MEDICINE

## 2020-03-10 PROCEDURE — 93922 CV US ANKLE BRACHIAL INDICES EXT LTD WO STRESS (CUPID ONLY): ICD-10-PCS | Mod: 26,S$PBB,, | Performed by: INTERNAL MEDICINE

## 2020-03-10 PROCEDURE — 93880 EXTRACRANIAL BILAT STUDY: CPT | Mod: PBBFAC,PO | Performed by: INTERNAL MEDICINE

## 2020-03-10 PROCEDURE — 93922 UPR/L XTREMITY ART 2 LEVELS: CPT | Mod: PBBFAC,PO | Performed by: INTERNAL MEDICINE

## 2020-06-08 ENCOUNTER — OFFICE VISIT (OUTPATIENT)
Dept: FAMILY MEDICINE | Facility: CLINIC | Age: 70
End: 2020-06-08
Payer: MEDICARE

## 2020-06-08 VITALS
SYSTOLIC BLOOD PRESSURE: 120 MMHG | HEART RATE: 83 BPM | WEIGHT: 163.13 LBS | HEIGHT: 67 IN | BODY MASS INDEX: 25.6 KG/M2 | TEMPERATURE: 98 F | DIASTOLIC BLOOD PRESSURE: 80 MMHG | OXYGEN SATURATION: 99 %

## 2020-06-08 DIAGNOSIS — Z78.9 ALCOHOL USE: ICD-10-CM

## 2020-06-08 DIAGNOSIS — I77.9 MILD CAROTID ARTERY DISEASE: ICD-10-CM

## 2020-06-08 DIAGNOSIS — E53.8 B12 DEFICIENCY: ICD-10-CM

## 2020-06-08 DIAGNOSIS — E55.9 VITAMIN D INSUFFICIENCY: ICD-10-CM

## 2020-06-08 DIAGNOSIS — Z72.0 TOBACCO USE: ICD-10-CM

## 2020-06-08 DIAGNOSIS — R53.83 FATIGUE, UNSPECIFIED TYPE: Primary | ICD-10-CM

## 2020-06-08 PROCEDURE — 99214 PR OFFICE/OUTPT VISIT, EST, LEVL IV, 30-39 MIN: ICD-10-PCS | Mod: S$PBB,,, | Performed by: FAMILY MEDICINE

## 2020-06-08 PROCEDURE — 99214 OFFICE O/P EST MOD 30 MIN: CPT | Mod: S$PBB,,, | Performed by: FAMILY MEDICINE

## 2020-06-08 PROCEDURE — 99999 PR PBB SHADOW E&M-EST. PATIENT-LVL III: ICD-10-PCS | Mod: PBBFAC,,, | Performed by: FAMILY MEDICINE

## 2020-06-08 PROCEDURE — 99999 PR PBB SHADOW E&M-EST. PATIENT-LVL III: CPT | Mod: PBBFAC,,, | Performed by: FAMILY MEDICINE

## 2020-06-08 PROCEDURE — 99213 OFFICE O/P EST LOW 20 MIN: CPT | Mod: PBBFAC,PO | Performed by: FAMILY MEDICINE

## 2020-06-08 NOTE — PROGRESS NOTES
"Subjective:       Patient ID: Bev Miguel is a 69 y.o. female.    Chief Complaint: Annual Exam    Pt is known to me.  Pt is here for followup of chronic medical issues.  The pt has been feeling well--she does say she is a little down during the stay at home order.  She does not think she needs medications.  She does not always sleep well--her cat sleeps with her and her  moves around in his sleep.  She sometimes feels fatigued.    Review of Systems   Constitutional: Negative for activity change, appetite change, fatigue and unexpected weight change.   Eyes: Negative for visual disturbance.   Respiratory: Negative for cough, chest tightness and shortness of breath.    Cardiovascular: Negative for chest pain, palpitations and leg swelling.   Gastrointestinal: Negative for abdominal pain, constipation, diarrhea, nausea and vomiting.   Endocrine: Negative for cold intolerance, heat intolerance and polyuria.   Genitourinary: Negative for decreased urine volume and dysuria.   Musculoskeletal: Negative for arthralgias and back pain.   Skin: Negative for rash.   Neurological: Negative for numbness and headaches.   Psychiatric/Behavioral: Negative for dysphoric mood and sleep disturbance. The patient is not nervous/anxious.        Objective:       Vitals:    06/08/20 0825   BP: 120/80   BP Location: Left arm   Patient Position: Sitting   Pulse: 83   Temp: 98.2 °F (36.8 °C)   TempSrc: Oral   SpO2: 99%   Weight: 74 kg (163 lb 2.3 oz)   Height: 5' 7" (1.702 m)     Physical Exam   Constitutional: She is oriented to person, place, and time. She appears well-developed and well-nourished.   HENT:   Head: Normocephalic.   Eyes: Pupils are equal, round, and reactive to light. Conjunctivae and EOM are normal.   Neck: Normal range of motion. Neck supple. No thyromegaly present.   Cardiovascular: Normal rate, regular rhythm and normal heart sounds.   Pulmonary/Chest: Effort normal and breath sounds normal.   Abdominal: Soft. " Bowel sounds are normal. There is no tenderness.   Musculoskeletal: Normal range of motion. She exhibits no tenderness or deformity.   Lymphadenopathy:     She has no cervical adenopathy.   Neurological: She is alert and oriented to person, place, and time. She displays normal reflexes. No cranial nerve deficit. She exhibits normal muscle tone. Coordination normal.   Skin: Skin is warm and dry.   Psychiatric: She has a normal mood and affect. Her behavior is normal.       Assessment:       1. Fatigue, unspecified type    2. Mild carotid artery disease    3. Alcohol use    4. B12 deficiency    5. Vitamin D insufficiency    6. Tobacco use        Plan:       Bev was seen today for annual exam.    Diagnoses and all orders for this visit:    Fatigue, unspecified type  -     CBC auto differential; Future  -     Comprehensive metabolic panel; Future  -     TSH; Future    Mild carotid artery disease    Alcohol use    B12 deficiency  -     Vitamin B12; Future    Vitamin D insufficiency  -     Vitamin D; Future    Tobacco use      During this visit, I reviewed the pt's history, medications, allergies, and problem list.

## 2020-06-15 ENCOUNTER — LAB VISIT (OUTPATIENT)
Dept: LAB | Facility: HOSPITAL | Age: 70
End: 2020-06-15
Attending: FAMILY MEDICINE
Payer: MEDICARE

## 2020-06-15 DIAGNOSIS — E55.9 VITAMIN D INSUFFICIENCY: ICD-10-CM

## 2020-06-15 DIAGNOSIS — R53.83 FATIGUE, UNSPECIFIED TYPE: ICD-10-CM

## 2020-06-15 DIAGNOSIS — E53.8 B12 DEFICIENCY: ICD-10-CM

## 2020-06-15 LAB
25(OH)D3+25(OH)D2 SERPL-MCNC: 77 NG/ML (ref 30–96)
ALBUMIN SERPL BCP-MCNC: 3.9 G/DL (ref 3.5–5.2)
ALP SERPL-CCNC: 82 U/L (ref 55–135)
ALT SERPL W/O P-5'-P-CCNC: 17 U/L (ref 10–44)
ANION GAP SERPL CALC-SCNC: 8 MMOL/L (ref 8–16)
AST SERPL-CCNC: 22 U/L (ref 10–40)
BASOPHILS # BLD AUTO: 0.05 K/UL (ref 0–0.2)
BASOPHILS NFR BLD: 0.8 % (ref 0–1.9)
BILIRUB SERPL-MCNC: 0.5 MG/DL (ref 0.1–1)
BUN SERPL-MCNC: 15 MG/DL (ref 8–23)
CALCIUM SERPL-MCNC: 9 MG/DL (ref 8.7–10.5)
CHLORIDE SERPL-SCNC: 104 MMOL/L (ref 95–110)
CO2 SERPL-SCNC: 26 MMOL/L (ref 23–29)
CREAT SERPL-MCNC: 0.8 MG/DL (ref 0.5–1.4)
DIFFERENTIAL METHOD: ABNORMAL
EOSINOPHIL # BLD AUTO: 0.2 K/UL (ref 0–0.5)
EOSINOPHIL NFR BLD: 2.9 % (ref 0–8)
ERYTHROCYTE [DISTWIDTH] IN BLOOD BY AUTOMATED COUNT: 14 % (ref 11.5–14.5)
EST. GFR  (AFRICAN AMERICAN): >60 ML/MIN/1.73 M^2
EST. GFR  (NON AFRICAN AMERICAN): >60 ML/MIN/1.73 M^2
GLUCOSE SERPL-MCNC: 92 MG/DL (ref 70–110)
HCT VFR BLD AUTO: 42.6 % (ref 37–48.5)
HGB BLD-MCNC: 13.4 G/DL (ref 12–16)
IMM GRANULOCYTES # BLD AUTO: 0.04 K/UL (ref 0–0.04)
IMM GRANULOCYTES NFR BLD AUTO: 0.6 % (ref 0–0.5)
LYMPHOCYTES # BLD AUTO: 2.6 K/UL (ref 1–4.8)
LYMPHOCYTES NFR BLD: 38.7 % (ref 18–48)
MCH RBC QN AUTO: 33.3 PG (ref 27–31)
MCHC RBC AUTO-ENTMCNC: 31.5 G/DL (ref 32–36)
MCV RBC AUTO: 106 FL (ref 82–98)
MONOCYTES # BLD AUTO: 0.4 K/UL (ref 0.3–1)
MONOCYTES NFR BLD: 6.1 % (ref 4–15)
NEUTROPHILS # BLD AUTO: 3.4 K/UL (ref 1.8–7.7)
NEUTROPHILS NFR BLD: 50.9 % (ref 38–73)
NRBC BLD-RTO: 0 /100 WBC
PLATELET # BLD AUTO: 263 K/UL (ref 150–350)
PMV BLD AUTO: 11.1 FL (ref 9.2–12.9)
POTASSIUM SERPL-SCNC: 4.7 MMOL/L (ref 3.5–5.1)
PROT SERPL-MCNC: 7 G/DL (ref 6–8.4)
RBC # BLD AUTO: 4.02 M/UL (ref 4–5.4)
SODIUM SERPL-SCNC: 138 MMOL/L (ref 136–145)
TSH SERPL DL<=0.005 MIU/L-ACNC: 1.88 UIU/ML (ref 0.4–4)
VIT B12 SERPL-MCNC: 538 PG/ML (ref 210–950)
WBC # BLD AUTO: 6.61 K/UL (ref 3.9–12.7)

## 2020-06-15 PROCEDURE — 85025 COMPLETE CBC W/AUTO DIFF WBC: CPT

## 2020-06-15 PROCEDURE — 36415 COLL VENOUS BLD VENIPUNCTURE: CPT | Mod: PO

## 2020-06-15 PROCEDURE — 82607 VITAMIN B-12: CPT

## 2020-06-15 PROCEDURE — 82306 VITAMIN D 25 HYDROXY: CPT

## 2020-06-15 PROCEDURE — 80053 COMPREHEN METABOLIC PANEL: CPT

## 2020-06-15 PROCEDURE — 84443 ASSAY THYROID STIM HORMONE: CPT

## 2020-12-03 ENCOUNTER — PATIENT OUTREACH (OUTPATIENT)
Dept: ADMINISTRATIVE | Facility: OTHER | Age: 70
End: 2020-12-03

## 2020-12-03 NOTE — PROGRESS NOTES
LINKS immunization registry not responding  Care Everywhere updated  Health Maintenance updated  Chart reviewed for overdue Proactive [facility] Encounters (ZAID) health maintenance testing (CRS, Breast Ca, Diabetic Eye Exam)   Orders entered:N/A

## 2020-12-07 ENCOUNTER — OFFICE VISIT (OUTPATIENT)
Dept: CARDIOLOGY | Facility: CLINIC | Age: 70
End: 2020-12-07
Payer: MEDICARE

## 2020-12-07 VITALS
WEIGHT: 162.06 LBS | SYSTOLIC BLOOD PRESSURE: 146 MMHG | BODY MASS INDEX: 25.44 KG/M2 | HEIGHT: 67 IN | DIASTOLIC BLOOD PRESSURE: 70 MMHG | HEART RATE: 87 BPM

## 2020-12-07 DIAGNOSIS — I51.89 DIASTOLIC DYSFUNCTION: ICD-10-CM

## 2020-12-07 DIAGNOSIS — Z72.0 TOBACCO USE: ICD-10-CM

## 2020-12-07 DIAGNOSIS — I73.00 RAYNAUD'S DISEASE WITHOUT GANGRENE: Primary | ICD-10-CM

## 2020-12-07 DIAGNOSIS — R03.0 ELEVATED BP WITHOUT DIAGNOSIS OF HYPERTENSION: ICD-10-CM

## 2020-12-07 PROCEDURE — 99999 PR PBB SHADOW E&M-EST. PATIENT-LVL III: CPT | Mod: PBBFAC,,, | Performed by: PHYSICIAN ASSISTANT

## 2020-12-07 PROCEDURE — 99214 PR OFFICE/OUTPT VISIT, EST, LEVL IV, 30-39 MIN: ICD-10-PCS | Mod: S$PBB,,, | Performed by: PHYSICIAN ASSISTANT

## 2020-12-07 PROCEDURE — 99214 OFFICE O/P EST MOD 30 MIN: CPT | Mod: S$PBB,,, | Performed by: PHYSICIAN ASSISTANT

## 2020-12-07 PROCEDURE — 99999 PR PBB SHADOW E&M-EST. PATIENT-LVL III: ICD-10-PCS | Mod: PBBFAC,,, | Performed by: PHYSICIAN ASSISTANT

## 2020-12-07 PROCEDURE — 99213 OFFICE O/P EST LOW 20 MIN: CPT | Mod: PBBFAC,PO | Performed by: PHYSICIAN ASSISTANT

## 2020-12-07 RX ORDER — AMLODIPINE BESYLATE 2.5 MG/1
2.5 TABLET ORAL NIGHTLY
Qty: 90 TABLET | Refills: 2 | Status: SHIPPED | OUTPATIENT
Start: 2020-12-07 | End: 2021-05-20 | Stop reason: DRUGHIGH

## 2020-12-16 ENCOUNTER — OFFICE VISIT (OUTPATIENT)
Dept: FAMILY MEDICINE | Facility: CLINIC | Age: 70
End: 2020-12-16
Payer: MEDICARE

## 2020-12-16 VITALS
DIASTOLIC BLOOD PRESSURE: 78 MMHG | HEIGHT: 67 IN | BODY MASS INDEX: 25.51 KG/M2 | OXYGEN SATURATION: 98 % | TEMPERATURE: 98 F | SYSTOLIC BLOOD PRESSURE: 122 MMHG | WEIGHT: 162.5 LBS | HEART RATE: 77 BPM

## 2020-12-16 DIAGNOSIS — Z72.0 TOBACCO USE: ICD-10-CM

## 2020-12-16 DIAGNOSIS — I51.89 DIASTOLIC DYSFUNCTION: Primary | ICD-10-CM

## 2020-12-16 DIAGNOSIS — F51.01 PRIMARY INSOMNIA: ICD-10-CM

## 2020-12-16 PROCEDURE — 99214 PR OFFICE/OUTPT VISIT, EST, LEVL IV, 30-39 MIN: ICD-10-PCS | Mod: S$PBB,,, | Performed by: FAMILY MEDICINE

## 2020-12-16 PROCEDURE — 99214 OFFICE O/P EST MOD 30 MIN: CPT | Mod: S$PBB,,, | Performed by: FAMILY MEDICINE

## 2020-12-16 PROCEDURE — 99214 OFFICE O/P EST MOD 30 MIN: CPT | Mod: PBBFAC,PO | Performed by: FAMILY MEDICINE

## 2020-12-16 PROCEDURE — 99999 PR PBB SHADOW E&M-EST. PATIENT-LVL IV: ICD-10-PCS | Mod: PBBFAC,,, | Performed by: FAMILY MEDICINE

## 2020-12-16 PROCEDURE — 99999 PR PBB SHADOW E&M-EST. PATIENT-LVL IV: CPT | Mod: PBBFAC,,, | Performed by: FAMILY MEDICINE

## 2020-12-16 RX ORDER — DOXEPIN HYDROCHLORIDE 25 MG/1
25 CAPSULE ORAL NIGHTLY
Qty: 90 CAPSULE | Refills: 1 | Status: SHIPPED | OUTPATIENT
Start: 2020-12-16 | End: 2021-03-04

## 2020-12-16 RX ORDER — INFLUENZA A VIRUS A/MICHIGAN/45/2015 X-275 (H1N1) ANTIGEN (FORMALDEHYDE INACTIVATED), INFLUENZA A VIRUS A/SINGAPORE/INFIMH-16-0019/2016 IVR-186 (H3N2) ANTIGEN (FORMALDEHYDE INACTIVATED), INFLUENZA B VIRUS B/PHUKET/3073/2013 ANTIGEN (FORMALDEHYDE INACTIVATED), AND INFLUENZA B VIRUS B/MARYLAND/15/2016 BX-69A ANTIGEN (FORMALDEHYDE INACTIVATED) 60; 60; 60; 60 UG/.7ML; UG/.7ML; UG/.7ML; UG/.7ML
INJECTION, SUSPENSION INTRAMUSCULAR
COMMUNITY
Start: 2020-11-16 | End: 2021-03-04

## 2020-12-16 NOTE — PROGRESS NOTES
"Subjective:       Patient ID: Bev Miguel is a 70 y.o. female.    Chief Complaint: 6 month follow up fatigue    Pt is known to me.  Pt is here for followup of chronic medical issues.  The pt has been doing well however she often feels tired.  She is not dizzy but feels woozy sometimes.  She does not sleep well at all.   She had a good cardiology appt recently.  Her most recent labs look good.    Review of Systems   Constitutional: Positive for fatigue. Negative for activity change, appetite change and unexpected weight change.   Eyes: Negative for visual disturbance.   Respiratory: Negative for cough, chest tightness and shortness of breath.    Cardiovascular: Negative for chest pain, palpitations and leg swelling.   Gastrointestinal: Negative for abdominal pain, constipation, diarrhea, nausea and vomiting.   Endocrine: Negative for cold intolerance, heat intolerance and polyuria.   Genitourinary: Negative for decreased urine volume and dysuria.   Musculoskeletal: Negative for arthralgias and back pain.   Skin: Negative for rash.   Neurological: Negative for numbness and headaches.   Psychiatric/Behavioral: Negative for dysphoric mood and sleep disturbance. The patient is not nervous/anxious.        Objective:       Vitals:    12/16/20 1011   BP: 122/78   BP Location: Right arm   Patient Position: Sitting   Pulse: 77   Temp: 98.3 °F (36.8 °C)   TempSrc: Oral   SpO2: 98%   Weight: 73.7 kg (162 lb 7.7 oz)   Height: 5' 7" (1.702 m)     Physical Exam  Constitutional:       Appearance: She is well-developed.   HENT:      Head: Normocephalic.   Eyes:      Conjunctiva/sclera: Conjunctivae normal.      Pupils: Pupils are equal, round, and reactive to light.   Neck:      Musculoskeletal: Normal range of motion and neck supple.      Thyroid: No thyromegaly.   Cardiovascular:      Rate and Rhythm: Normal rate and regular rhythm.      Heart sounds: Normal heart sounds.   Pulmonary:      Effort: Pulmonary effort is normal. "      Breath sounds: Normal breath sounds.   Abdominal:      General: Bowel sounds are normal.      Palpations: Abdomen is soft.      Tenderness: There is no abdominal tenderness.   Musculoskeletal: Normal range of motion.         General: No tenderness or deformity.   Lymphadenopathy:      Cervical: No cervical adenopathy.   Skin:     General: Skin is warm and dry.   Neurological:      Mental Status: She is alert and oriented to person, place, and time.      Cranial Nerves: No cranial nerve deficit.      Motor: No abnormal muscle tone.      Coordination: Coordination normal.      Deep Tendon Reflexes: Reflexes normal.   Psychiatric:         Behavior: Behavior normal.         Assessment:       1. Diastolic dysfunction    2. Tobacco use    3. Primary insomnia        Plan:       Bev was seen today for 6 month follow up fatigue.    Diagnoses and all orders for this visit:    Diastolic dysfunction    Tobacco use    Primary insomnia  -     doxepin (SINEQUAN) 25 MG capsule; Take 1 capsule (25 mg total) by mouth every evening.      During this visit, I reviewed the pt's history, medications, allergies, and problem list.

## 2021-03-03 ENCOUNTER — TELEPHONE (OUTPATIENT)
Dept: FAMILY MEDICINE | Facility: CLINIC | Age: 71
End: 2021-03-03

## 2021-03-04 ENCOUNTER — OFFICE VISIT (OUTPATIENT)
Dept: FAMILY MEDICINE | Facility: CLINIC | Age: 71
End: 2021-03-04
Payer: MEDICARE

## 2021-03-04 ENCOUNTER — TELEPHONE (OUTPATIENT)
Dept: CARDIOLOGY | Facility: CLINIC | Age: 71
End: 2021-03-04

## 2021-03-04 VITALS
DIASTOLIC BLOOD PRESSURE: 78 MMHG | OXYGEN SATURATION: 98 % | WEIGHT: 161.81 LBS | HEART RATE: 96 BPM | TEMPERATURE: 98 F | HEIGHT: 67 IN | RESPIRATION RATE: 18 BRPM | BODY MASS INDEX: 25.4 KG/M2 | SYSTOLIC BLOOD PRESSURE: 130 MMHG

## 2021-03-04 DIAGNOSIS — Z72.0 NICOTINE ABUSE: ICD-10-CM

## 2021-03-04 DIAGNOSIS — I77.9 MILD CAROTID ARTERY DISEASE: ICD-10-CM

## 2021-03-04 DIAGNOSIS — M85.80 OSTEOPENIA, UNSPECIFIED LOCATION: ICD-10-CM

## 2021-03-04 DIAGNOSIS — Z78.9 ALCOHOL USE: ICD-10-CM

## 2021-03-04 DIAGNOSIS — Z01.818 PRE-OP EXAMINATION: Primary | ICD-10-CM

## 2021-03-04 DIAGNOSIS — I73.00 RAYNAUD'S DISEASE WITHOUT GANGRENE: ICD-10-CM

## 2021-03-04 PROCEDURE — 93005 ELECTROCARDIOGRAM TRACING: CPT | Mod: PBBFAC,PO | Performed by: INTERNAL MEDICINE

## 2021-03-04 PROCEDURE — 99215 OFFICE O/P EST HI 40 MIN: CPT | Mod: PBBFAC,PO,25 | Performed by: PHYSICIAN ASSISTANT

## 2021-03-04 PROCEDURE — 93010 EKG 12-LEAD: ICD-10-PCS | Mod: S$PBB,,, | Performed by: INTERNAL MEDICINE

## 2021-03-04 PROCEDURE — 99214 OFFICE O/P EST MOD 30 MIN: CPT | Mod: S$PBB,,, | Performed by: PHYSICIAN ASSISTANT

## 2021-03-04 PROCEDURE — 99999 PR PBB SHADOW E&M-EST. PATIENT-LVL V: ICD-10-PCS | Mod: PBBFAC,,, | Performed by: PHYSICIAN ASSISTANT

## 2021-03-04 PROCEDURE — 99214 PR OFFICE/OUTPT VISIT, EST, LEVL IV, 30-39 MIN: ICD-10-PCS | Mod: S$PBB,,, | Performed by: PHYSICIAN ASSISTANT

## 2021-03-04 PROCEDURE — 93010 ELECTROCARDIOGRAM REPORT: CPT | Mod: S$PBB,,, | Performed by: INTERNAL MEDICINE

## 2021-03-04 PROCEDURE — 99999 PR PBB SHADOW E&M-EST. PATIENT-LVL V: CPT | Mod: PBBFAC,,, | Performed by: PHYSICIAN ASSISTANT

## 2021-05-06 ENCOUNTER — TELEPHONE (OUTPATIENT)
Dept: CARDIOLOGY | Facility: CLINIC | Age: 71
End: 2021-05-06

## 2021-05-20 ENCOUNTER — OFFICE VISIT (OUTPATIENT)
Dept: CARDIOLOGY | Facility: CLINIC | Age: 71
End: 2021-05-20
Payer: MEDICARE

## 2021-05-20 VITALS
HEART RATE: 93 BPM | WEIGHT: 160.5 LBS | SYSTOLIC BLOOD PRESSURE: 139 MMHG | DIASTOLIC BLOOD PRESSURE: 88 MMHG | HEIGHT: 67 IN | BODY MASS INDEX: 25.19 KG/M2

## 2021-05-20 DIAGNOSIS — Z72.0 TOBACCO USE: Chronic | ICD-10-CM

## 2021-05-20 DIAGNOSIS — I34.0 NON-RHEUMATIC MITRAL REGURGITATION: Chronic | ICD-10-CM

## 2021-05-20 DIAGNOSIS — I10 ESSENTIAL HYPERTENSION: Primary | Chronic | ICD-10-CM

## 2021-05-20 DIAGNOSIS — I73.00 RAYNAUD'S DISEASE WITHOUT GANGRENE: Chronic | ICD-10-CM

## 2021-05-20 PROCEDURE — 99214 PR OFFICE/OUTPT VISIT, EST, LEVL IV, 30-39 MIN: ICD-10-PCS | Mod: S$PBB,,, | Performed by: INTERNAL MEDICINE

## 2021-05-20 PROCEDURE — 99214 OFFICE O/P EST MOD 30 MIN: CPT | Mod: PBBFAC,PO | Performed by: INTERNAL MEDICINE

## 2021-05-20 PROCEDURE — 99999 PR PBB SHADOW E&M-EST. PATIENT-LVL IV: ICD-10-PCS | Mod: PBBFAC,,, | Performed by: INTERNAL MEDICINE

## 2021-05-20 PROCEDURE — 99214 OFFICE O/P EST MOD 30 MIN: CPT | Mod: S$PBB,,, | Performed by: INTERNAL MEDICINE

## 2021-05-20 PROCEDURE — 99999 PR PBB SHADOW E&M-EST. PATIENT-LVL IV: CPT | Mod: PBBFAC,,, | Performed by: INTERNAL MEDICINE

## 2021-05-20 RX ORDER — AMLODIPINE BESYLATE 5 MG/1
5 TABLET ORAL DAILY
Qty: 90 TABLET | Refills: 2 | Status: SHIPPED | OUTPATIENT
Start: 2021-05-20 | End: 2022-04-26 | Stop reason: SDUPTHER

## 2021-05-31 ENCOUNTER — OFFICE VISIT (OUTPATIENT)
Dept: FAMILY MEDICINE | Facility: CLINIC | Age: 71
End: 2021-05-31
Payer: MEDICARE

## 2021-05-31 VITALS
HEIGHT: 67 IN | TEMPERATURE: 98 F | BODY MASS INDEX: 25.4 KG/M2 | SYSTOLIC BLOOD PRESSURE: 138 MMHG | OXYGEN SATURATION: 99 % | HEART RATE: 76 BPM | DIASTOLIC BLOOD PRESSURE: 66 MMHG | WEIGHT: 161.81 LBS

## 2021-05-31 DIAGNOSIS — I10 ESSENTIAL HYPERTENSION: ICD-10-CM

## 2021-05-31 DIAGNOSIS — D75.89 MACROCYTOSIS: ICD-10-CM

## 2021-05-31 DIAGNOSIS — Z72.0 TOBACCO USE: ICD-10-CM

## 2021-05-31 DIAGNOSIS — R53.83 FATIGUE, UNSPECIFIED TYPE: Primary | ICD-10-CM

## 2021-05-31 DIAGNOSIS — E53.8 B12 DEFICIENCY: ICD-10-CM

## 2021-05-31 PROCEDURE — 99214 OFFICE O/P EST MOD 30 MIN: CPT | Mod: S$PBB,,, | Performed by: FAMILY MEDICINE

## 2021-05-31 PROCEDURE — 99999 PR PBB SHADOW E&M-EST. PATIENT-LVL IV: ICD-10-PCS | Mod: PBBFAC,,, | Performed by: FAMILY MEDICINE

## 2021-05-31 PROCEDURE — 99214 OFFICE O/P EST MOD 30 MIN: CPT | Mod: PBBFAC,PO | Performed by: FAMILY MEDICINE

## 2021-05-31 PROCEDURE — 99214 PR OFFICE/OUTPT VISIT, EST, LEVL IV, 30-39 MIN: ICD-10-PCS | Mod: S$PBB,,, | Performed by: FAMILY MEDICINE

## 2021-05-31 PROCEDURE — 99999 PR PBB SHADOW E&M-EST. PATIENT-LVL IV: CPT | Mod: PBBFAC,,, | Performed by: FAMILY MEDICINE

## 2021-11-22 ENCOUNTER — PATIENT OUTREACH (OUTPATIENT)
Dept: ADMINISTRATIVE | Facility: OTHER | Age: 71
End: 2021-11-22
Payer: MEDICARE

## 2021-11-22 ENCOUNTER — OFFICE VISIT (OUTPATIENT)
Dept: FAMILY MEDICINE | Facility: CLINIC | Age: 71
End: 2021-11-22
Payer: MEDICARE

## 2021-11-22 VITALS
WEIGHT: 160.06 LBS | TEMPERATURE: 98 F | OXYGEN SATURATION: 97 % | BODY MASS INDEX: 25.12 KG/M2 | HEART RATE: 70 BPM | HEIGHT: 67 IN | SYSTOLIC BLOOD PRESSURE: 114 MMHG | DIASTOLIC BLOOD PRESSURE: 68 MMHG

## 2021-11-22 DIAGNOSIS — Z23 NEED FOR VACCINATION: ICD-10-CM

## 2021-11-22 DIAGNOSIS — R53.83 FATIGUE, UNSPECIFIED TYPE: ICD-10-CM

## 2021-11-22 DIAGNOSIS — M81.0 OSTEOPOROSIS, UNSPECIFIED OSTEOPOROSIS TYPE, UNSPECIFIED PATHOLOGICAL FRACTURE PRESENCE: ICD-10-CM

## 2021-11-22 DIAGNOSIS — I10 ESSENTIAL HYPERTENSION: Primary | ICD-10-CM

## 2021-11-22 DIAGNOSIS — E53.8 B12 DEFICIENCY: ICD-10-CM

## 2021-11-22 PROCEDURE — 99214 OFFICE O/P EST MOD 30 MIN: CPT | Mod: S$PBB,,, | Performed by: FAMILY MEDICINE

## 2021-11-22 PROCEDURE — 99999 PR PBB SHADOW E&M-EST. PATIENT-LVL IV: CPT | Mod: PBBFAC,,, | Performed by: FAMILY MEDICINE

## 2021-11-22 PROCEDURE — G0009 ADMIN PNEUMOCOCCAL VACCINE: HCPCS | Mod: PBBFAC,PO

## 2021-11-22 PROCEDURE — 99214 OFFICE O/P EST MOD 30 MIN: CPT | Mod: PBBFAC,PO,25 | Performed by: FAMILY MEDICINE

## 2021-11-22 PROCEDURE — 99999 PR PBB SHADOW E&M-EST. PATIENT-LVL IV: ICD-10-PCS | Mod: PBBFAC,,, | Performed by: FAMILY MEDICINE

## 2021-11-22 PROCEDURE — 90732 PPSV23 VACC 2 YRS+ SUBQ/IM: CPT | Mod: PBBFAC,PO

## 2021-11-22 PROCEDURE — 99214 PR OFFICE/OUTPT VISIT, EST, LEVL IV, 30-39 MIN: ICD-10-PCS | Mod: S$PBB,,, | Performed by: FAMILY MEDICINE

## 2021-11-22 RX ORDER — ALENDRONATE SODIUM 70 MG/1
70 TABLET ORAL
Qty: 15 TABLET | Refills: 1 | Status: SHIPPED | OUTPATIENT
Start: 2021-11-22 | End: 2022-06-07 | Stop reason: SDUPTHER

## 2021-11-24 ENCOUNTER — OFFICE VISIT (OUTPATIENT)
Dept: OBSTETRICS AND GYNECOLOGY | Facility: CLINIC | Age: 71
End: 2021-11-24
Payer: MEDICARE

## 2021-11-24 VITALS
SYSTOLIC BLOOD PRESSURE: 120 MMHG | BODY MASS INDEX: 25.28 KG/M2 | WEIGHT: 161.38 LBS | DIASTOLIC BLOOD PRESSURE: 64 MMHG

## 2021-11-24 DIAGNOSIS — N95.8 OTHER SPECIFIED MENOPAUSAL AND PERIMENOPAUSAL DISORDERS: ICD-10-CM

## 2021-11-24 DIAGNOSIS — Z01.419 WELL WOMAN EXAM: Primary | ICD-10-CM

## 2021-11-24 DIAGNOSIS — Z12.31 ENCOUNTER FOR SCREENING MAMMOGRAM FOR MALIGNANT NEOPLASM OF BREAST: ICD-10-CM

## 2021-11-24 PROCEDURE — 99999 PR PBB SHADOW E&M-EST. PATIENT-LVL IV: ICD-10-PCS | Mod: PBBFAC,,, | Performed by: STUDENT IN AN ORGANIZED HEALTH CARE EDUCATION/TRAINING PROGRAM

## 2021-11-24 PROCEDURE — G0101 PR CA SCREEN;PELVIC/BREAST EXAM: ICD-10-PCS | Mod: S$PBB,,, | Performed by: STUDENT IN AN ORGANIZED HEALTH CARE EDUCATION/TRAINING PROGRAM

## 2021-11-24 PROCEDURE — 99999 PR PBB SHADOW E&M-EST. PATIENT-LVL IV: CPT | Mod: PBBFAC,,, | Performed by: STUDENT IN AN ORGANIZED HEALTH CARE EDUCATION/TRAINING PROGRAM

## 2021-11-24 PROCEDURE — 99214 OFFICE O/P EST MOD 30 MIN: CPT | Mod: PBBFAC,PN | Performed by: STUDENT IN AN ORGANIZED HEALTH CARE EDUCATION/TRAINING PROGRAM

## 2021-11-24 PROCEDURE — G0101 CA SCREEN;PELVIC/BREAST EXAM: HCPCS | Mod: S$PBB,,, | Performed by: STUDENT IN AN ORGANIZED HEALTH CARE EDUCATION/TRAINING PROGRAM

## 2021-12-06 ENCOUNTER — LAB VISIT (OUTPATIENT)
Dept: LAB | Facility: HOSPITAL | Age: 71
End: 2021-12-06
Attending: FAMILY MEDICINE
Payer: MEDICARE

## 2021-12-06 DIAGNOSIS — R53.83 FATIGUE, UNSPECIFIED TYPE: ICD-10-CM

## 2021-12-06 DIAGNOSIS — D75.89 MACROCYTOSIS: ICD-10-CM

## 2021-12-06 DIAGNOSIS — I10 ESSENTIAL HYPERTENSION: ICD-10-CM

## 2021-12-06 DIAGNOSIS — E53.8 B12 DEFICIENCY: ICD-10-CM

## 2021-12-06 LAB
ALBUMIN SERPL BCP-MCNC: 3.9 G/DL (ref 3.5–5.2)
ALBUMIN SERPL BCP-MCNC: 3.9 G/DL (ref 3.5–5.2)
ALP SERPL-CCNC: 68 U/L (ref 55–135)
ALP SERPL-CCNC: 68 U/L (ref 55–135)
ALT SERPL W/O P-5'-P-CCNC: 15 U/L (ref 10–44)
ALT SERPL W/O P-5'-P-CCNC: 15 U/L (ref 10–44)
ANION GAP SERPL CALC-SCNC: 12 MMOL/L (ref 8–16)
ANION GAP SERPL CALC-SCNC: 12 MMOL/L (ref 8–16)
AST SERPL-CCNC: 19 U/L (ref 10–40)
AST SERPL-CCNC: 19 U/L (ref 10–40)
BASOPHILS # BLD AUTO: 0.04 K/UL (ref 0–0.2)
BASOPHILS # BLD AUTO: 0.04 K/UL (ref 0–0.2)
BASOPHILS NFR BLD: 0.6 % (ref 0–1.9)
BASOPHILS NFR BLD: 0.6 % (ref 0–1.9)
BILIRUB SERPL-MCNC: 0.8 MG/DL (ref 0.1–1)
BILIRUB SERPL-MCNC: 0.8 MG/DL (ref 0.1–1)
BUN SERPL-MCNC: 15 MG/DL (ref 8–23)
BUN SERPL-MCNC: 15 MG/DL (ref 8–23)
CALCIUM SERPL-MCNC: 9.4 MG/DL (ref 8.7–10.5)
CALCIUM SERPL-MCNC: 9.4 MG/DL (ref 8.7–10.5)
CHLORIDE SERPL-SCNC: 105 MMOL/L (ref 95–110)
CHLORIDE SERPL-SCNC: 105 MMOL/L (ref 95–110)
CHOLEST SERPL-MCNC: 198 MG/DL (ref 120–199)
CHOLEST/HDLC SERPL: 2.3 {RATIO} (ref 2–5)
CO2 SERPL-SCNC: 23 MMOL/L (ref 23–29)
CO2 SERPL-SCNC: 23 MMOL/L (ref 23–29)
CREAT SERPL-MCNC: 0.7 MG/DL (ref 0.5–1.4)
CREAT SERPL-MCNC: 0.7 MG/DL (ref 0.5–1.4)
DIFFERENTIAL METHOD: ABNORMAL
DIFFERENTIAL METHOD: ABNORMAL
EOSINOPHIL # BLD AUTO: 0.2 K/UL (ref 0–0.5)
EOSINOPHIL # BLD AUTO: 0.2 K/UL (ref 0–0.5)
EOSINOPHIL NFR BLD: 2.7 % (ref 0–8)
EOSINOPHIL NFR BLD: 2.7 % (ref 0–8)
ERYTHROCYTE [DISTWIDTH] IN BLOOD BY AUTOMATED COUNT: 13.6 % (ref 11.5–14.5)
ERYTHROCYTE [DISTWIDTH] IN BLOOD BY AUTOMATED COUNT: 13.6 % (ref 11.5–14.5)
EST. GFR  (AFRICAN AMERICAN): >60 ML/MIN/1.73 M^2
EST. GFR  (AFRICAN AMERICAN): >60 ML/MIN/1.73 M^2
EST. GFR  (NON AFRICAN AMERICAN): >60 ML/MIN/1.73 M^2
EST. GFR  (NON AFRICAN AMERICAN): >60 ML/MIN/1.73 M^2
GLUCOSE SERPL-MCNC: 92 MG/DL (ref 70–110)
GLUCOSE SERPL-MCNC: 92 MG/DL (ref 70–110)
HCT VFR BLD AUTO: 42.8 % (ref 37–48.5)
HCT VFR BLD AUTO: 42.8 % (ref 37–48.5)
HDLC SERPL-MCNC: 88 MG/DL (ref 40–75)
HDLC SERPL: 44.4 % (ref 20–50)
HGB BLD-MCNC: 13.7 G/DL (ref 12–16)
HGB BLD-MCNC: 13.7 G/DL (ref 12–16)
IMM GRANULOCYTES # BLD AUTO: 0.03 K/UL (ref 0–0.04)
IMM GRANULOCYTES # BLD AUTO: 0.03 K/UL (ref 0–0.04)
IMM GRANULOCYTES NFR BLD AUTO: 0.4 % (ref 0–0.5)
IMM GRANULOCYTES NFR BLD AUTO: 0.4 % (ref 0–0.5)
LDLC SERPL CALC-MCNC: 98 MG/DL (ref 63–159)
LYMPHOCYTES # BLD AUTO: 3.4 K/UL (ref 1–4.8)
LYMPHOCYTES # BLD AUTO: 3.4 K/UL (ref 1–4.8)
LYMPHOCYTES NFR BLD: 49.9 % (ref 18–48)
LYMPHOCYTES NFR BLD: 49.9 % (ref 18–48)
MCH RBC QN AUTO: 32.6 PG (ref 27–31)
MCH RBC QN AUTO: 32.6 PG (ref 27–31)
MCHC RBC AUTO-ENTMCNC: 32 G/DL (ref 32–36)
MCHC RBC AUTO-ENTMCNC: 32 G/DL (ref 32–36)
MCV RBC AUTO: 102 FL (ref 82–98)
MCV RBC AUTO: 102 FL (ref 82–98)
MONOCYTES # BLD AUTO: 0.4 K/UL (ref 0.3–1)
MONOCYTES # BLD AUTO: 0.4 K/UL (ref 0.3–1)
MONOCYTES NFR BLD: 5.5 % (ref 4–15)
MONOCYTES NFR BLD: 5.5 % (ref 4–15)
NEUTROPHILS # BLD AUTO: 2.7 K/UL (ref 1.8–7.7)
NEUTROPHILS # BLD AUTO: 2.7 K/UL (ref 1.8–7.7)
NEUTROPHILS NFR BLD: 40.9 % (ref 38–73)
NEUTROPHILS NFR BLD: 40.9 % (ref 38–73)
NONHDLC SERPL-MCNC: 110 MG/DL
NRBC BLD-RTO: 0 /100 WBC
NRBC BLD-RTO: 0 /100 WBC
PLATELET # BLD AUTO: 273 K/UL (ref 150–450)
PLATELET # BLD AUTO: 273 K/UL (ref 150–450)
PMV BLD AUTO: 11.3 FL (ref 9.2–12.9)
PMV BLD AUTO: 11.3 FL (ref 9.2–12.9)
POTASSIUM SERPL-SCNC: 5.6 MMOL/L (ref 3.5–5.1)
POTASSIUM SERPL-SCNC: 5.6 MMOL/L (ref 3.5–5.1)
PROT SERPL-MCNC: 6.9 G/DL (ref 6–8.4)
PROT SERPL-MCNC: 6.9 G/DL (ref 6–8.4)
RBC # BLD AUTO: 4.2 M/UL (ref 4–5.4)
RBC # BLD AUTO: 4.2 M/UL (ref 4–5.4)
SODIUM SERPL-SCNC: 140 MMOL/L (ref 136–145)
SODIUM SERPL-SCNC: 140 MMOL/L (ref 136–145)
T4 FREE SERPL-MCNC: 0.89 NG/DL (ref 0.71–1.51)
T4 FREE SERPL-MCNC: 0.89 NG/DL (ref 0.71–1.51)
TRIGL SERPL-MCNC: 60 MG/DL (ref 30–150)
TSH SERPL DL<=0.005 MIU/L-ACNC: 2.33 UIU/ML (ref 0.4–4)
TSH SERPL DL<=0.005 MIU/L-ACNC: 2.33 UIU/ML (ref 0.4–4)
VIT B12 SERPL-MCNC: 572 PG/ML (ref 210–950)
VIT B12 SERPL-MCNC: 572 PG/ML (ref 210–950)
WBC # BLD AUTO: 6.71 K/UL (ref 3.9–12.7)
WBC # BLD AUTO: 6.71 K/UL (ref 3.9–12.7)

## 2021-12-06 PROCEDURE — 36415 COLL VENOUS BLD VENIPUNCTURE: CPT | Mod: PO | Performed by: FAMILY MEDICINE

## 2021-12-06 PROCEDURE — 84443 ASSAY THYROID STIM HORMONE: CPT | Performed by: FAMILY MEDICINE

## 2021-12-06 PROCEDURE — 80061 LIPID PANEL: CPT | Performed by: FAMILY MEDICINE

## 2021-12-06 PROCEDURE — 82607 VITAMIN B-12: CPT | Performed by: FAMILY MEDICINE

## 2021-12-06 PROCEDURE — 84439 ASSAY OF FREE THYROXINE: CPT | Performed by: FAMILY MEDICINE

## 2021-12-06 PROCEDURE — 80053 COMPREHEN METABOLIC PANEL: CPT | Performed by: FAMILY MEDICINE

## 2021-12-06 PROCEDURE — 85025 COMPLETE CBC W/AUTO DIFF WBC: CPT | Performed by: FAMILY MEDICINE

## 2021-12-13 ENCOUNTER — HOSPITAL ENCOUNTER (OUTPATIENT)
Dept: RADIOLOGY | Facility: HOSPITAL | Age: 71
Discharge: HOME | End: 2021-12-13
Attending: STUDENT IN AN ORGANIZED HEALTH CARE EDUCATION/TRAINING PROGRAM
Payer: MEDICARE

## 2021-12-13 DIAGNOSIS — N95.8 OTHER SPECIFIED MENOPAUSAL AND PERIMENOPAUSAL DISORDERS: ICD-10-CM

## 2021-12-13 DIAGNOSIS — Z01.419 WELL WOMAN EXAM: ICD-10-CM

## 2021-12-13 PROCEDURE — 77080 DEXA BONE DENSITY SPINE HIP: ICD-10-PCS | Mod: 26,,, | Performed by: RADIOLOGY

## 2021-12-13 PROCEDURE — 77080 DXA BONE DENSITY AXIAL: CPT | Mod: 26,,, | Performed by: RADIOLOGY

## 2021-12-13 PROCEDURE — 77080 DXA BONE DENSITY AXIAL: CPT | Mod: TC,PO

## 2022-02-24 ENCOUNTER — PES CALL (OUTPATIENT)
Dept: ADMINISTRATIVE | Facility: CLINIC | Age: 72
End: 2022-02-24
Payer: MEDICARE

## 2022-03-21 ENCOUNTER — OFFICE VISIT (OUTPATIENT)
Dept: HOME HEALTH SERVICES | Facility: CLINIC | Age: 72
End: 2022-03-21
Payer: MEDICARE

## 2022-03-21 VITALS
WEIGHT: 157 LBS | SYSTOLIC BLOOD PRESSURE: 109 MMHG | HEART RATE: 78 BPM | HEIGHT: 67 IN | DIASTOLIC BLOOD PRESSURE: 76 MMHG | BODY MASS INDEX: 24.64 KG/M2 | OXYGEN SATURATION: 98 %

## 2022-03-21 DIAGNOSIS — E55.9 VITAMIN D INSUFFICIENCY: ICD-10-CM

## 2022-03-21 DIAGNOSIS — J45.20 MILD INTERMITTENT ASTHMA WITHOUT COMPLICATION: ICD-10-CM

## 2022-03-21 DIAGNOSIS — I51.89 DIASTOLIC DYSFUNCTION: ICD-10-CM

## 2022-03-21 DIAGNOSIS — I70.0 ATHEROSCLEROSIS OF AORTA: ICD-10-CM

## 2022-03-21 DIAGNOSIS — I77.9 MILD CAROTID ARTERY DISEASE: ICD-10-CM

## 2022-03-21 DIAGNOSIS — M85.80 OSTEOPENIA, UNSPECIFIED LOCATION: ICD-10-CM

## 2022-03-21 DIAGNOSIS — I10 ESSENTIAL HYPERTENSION: ICD-10-CM

## 2022-03-21 DIAGNOSIS — Z00.00 ENCOUNTER FOR PREVENTIVE HEALTH EXAMINATION: Primary | ICD-10-CM

## 2022-03-21 DIAGNOSIS — Z72.0 TOBACCO USE: ICD-10-CM

## 2022-03-21 PROCEDURE — G0439 PR MEDICARE ANNUAL WELLNESS SUBSEQUENT VISIT: ICD-10-PCS | Mod: S$GLB,,, | Performed by: NURSE PRACTITIONER

## 2022-03-21 PROCEDURE — G0439 PPPS, SUBSEQ VISIT: HCPCS | Mod: S$GLB,,, | Performed by: NURSE PRACTITIONER

## 2022-03-21 RX ORDER — MELATONIN 5 MG
5 CAPSULE ORAL DAILY
COMMUNITY
End: 2022-09-16

## 2022-03-21 NOTE — PROGRESS NOTES
"Bev Miguel presented for a  Medicare AWV and comprehensive Health Risk Assessment today. The following components were reviewed and updated:    · Medical history  · Family History  · Social history  · Allergies and Current Medications  · Health Risk Assessment  · Health Maintenance  · Care Team         ** See Completed Assessments for Annual Wellness Visit within the encounter summary.**         The following assessments were completed:  · Living Situation  · CAGE  · Depression Screening  · Timed Get Up and Go  · Whisper Test  · Cognitive Function Screening  · Nutrition Screening  · ADL Screening  · PAQ Screening        Vitals:    03/21/22 0839   BP: 109/76   Pulse: 78   SpO2: 98%   Weight: 71.2 kg (157 lb)   Height: 5' 7" (1.702 m)     Body mass index is 24.59 kg/m².  Physical Exam  Constitutional:       Appearance: Normal appearance.   HENT:      Head: Normocephalic and atraumatic.      Nose: Nose normal.   Eyes:      Extraocular Movements: Extraocular movements intact.      Pupils: Pupils are equal, round, and reactive to light.   Cardiovascular:      Rate and Rhythm: Normal rate and regular rhythm.      Pulses: Normal pulses.      Heart sounds: Normal heart sounds.   Pulmonary:      Effort: Pulmonary effort is normal.      Breath sounds: Normal breath sounds.   Musculoskeletal:      Cervical back: Normal range of motion and neck supple.   Neurological:      General: No focal deficit present.      Mental Status: She is alert and oriented to person, place, and time.               Diagnoses and health risks identified today and associated recommendations/orders:    1. Encounter for preventive health examination  AWV completed      2. Mild carotid artery disease  Chronic and stable. Continue current treatment. Follow with PCP.  On asa    3. Diastolic dysfunction  Chronic and stable. Continue current treatment. Follow with PCP.      4. Essential hypertension  Chronic and stable. Continue current treatment. Follow " with PCP.  On BP meds      5. Atherosclerosis of aorta  Chronic and stable. Continue current treatment. Follow with PCP.      6. Vitamin D insufficiency  Chronic and stable. Continue current treatment. Follow with PCP.  On replacement      7. Osteopenia, unspecified location  Chronic and stable. Continue current treatment. Follow with PCP.  On fosamax      8. Tobacco use  Chronic and stable. Continue current treatment. Follow with PCP.  Cessation encouraged    9. Mild intermittent asthma without complication  Chronic and stable. Continue current treatment. Follow with PCP.  Allergic - using PRN albuterol       Provided Bev with a 5-10 year written screening schedule and personal prevention plan. Recommendations were developed using the USPSTF age appropriate recommendations. Education, counseling, and referrals were provided as needed. After Visit Summary printed and given to patient which includes a list of additional screenings\tests needed.    Fu in 1yr for IVONE Hsu NP    I offered to discuss advanced care planning, including how to pick a person who would make decisions for you if you were unable to make them for yourself, called a health care power of , and what kind of decisions you might make such as use of life sustaining treatments such as ventilators and tube feeding when faced with a life limiting illness recorded on a living will that they will need to know. (How you want to be cared for as you near the end of your natural life)     X Patient is interested in learning more about how to make advanced directives.  I provided them paperwork and offered to discuss this with them.

## 2022-03-24 PROBLEM — Z71.6 ENCOUNTER FOR TOBACCO USE CESSATION COUNSELING: Status: RESOLVED | Noted: 2020-02-14 | Resolved: 2022-03-24

## 2022-03-24 PROBLEM — I70.0 ATHEROSCLEROSIS OF AORTA: Status: ACTIVE | Noted: 2022-03-24

## 2022-03-25 NOTE — PATIENT INSTRUCTIONS
Counseling and Referral of Other Preventative  (Italic type indicates deductible and co-insurance are waived)    Patient Name: Bev Miguel  Today's Date: 3/24/2022    Health Maintenance       Date Due Completion Date    Lipid Panel 12/06/2022 12/6/2021    DEXA Scan 12/13/2024 12/13/2021    Colorectal Cancer Screening 04/15/2027 4/15/2017 (Done)    Override on 4/15/2017: Done (Dr March GI; no polyps; repeat 5 yrs.)    TETANUS VACCINE 06/18/2028 6/18/2018        No orders of the defined types were placed in this encounter.    The following information is provided to all patients.  This information is to help you find resources for any of the problems found today that may be affecting your health:                Living healthy guide: www.[facility].[place].gov      Understanding Diabetes: www.diabetes.org      Eating healthy: www.cdc.gov/healthyweight      CDC home safety checklist: www.cdc.gov/steadi/patient.html      Agency on Aging: www.goea.[place].[facility]      Alcoholics anonymous (AA): www.aa.org      Physical Activity: www.[place].nih.gov/lw7ubes      Tobacco use: www.quitwithusla.org

## 2022-04-26 ENCOUNTER — LAB VISIT (OUTPATIENT)
Dept: LAB | Facility: HOSPITAL | Age: 72
End: 2022-04-26
Attending: INTERNAL MEDICINE
Payer: MEDICARE

## 2022-04-26 ENCOUNTER — OFFICE VISIT (OUTPATIENT)
Dept: CARDIOLOGY | Facility: CLINIC | Age: 72
End: 2022-04-26
Payer: MEDICARE

## 2022-04-26 VITALS
SYSTOLIC BLOOD PRESSURE: 128 MMHG | BODY MASS INDEX: 25.33 KG/M2 | DIASTOLIC BLOOD PRESSURE: 75 MMHG | HEART RATE: 82 BPM | HEIGHT: 67 IN | WEIGHT: 161.38 LBS

## 2022-04-26 DIAGNOSIS — Z72.0 TOBACCO USE: Chronic | ICD-10-CM

## 2022-04-26 DIAGNOSIS — I77.9 MILD CAROTID ARTERY DISEASE: Chronic | ICD-10-CM

## 2022-04-26 DIAGNOSIS — I70.0 ATHEROSCLEROSIS OF AORTA: Chronic | ICD-10-CM

## 2022-04-26 DIAGNOSIS — E87.5 HYPERKALEMIA: ICD-10-CM

## 2022-04-26 DIAGNOSIS — E87.5 HYPERKALEMIA: Primary | ICD-10-CM

## 2022-04-26 DIAGNOSIS — I73.00 RAYNAUD'S DISEASE WITHOUT GANGRENE: Chronic | ICD-10-CM

## 2022-04-26 DIAGNOSIS — I34.0 NON-RHEUMATIC MITRAL REGURGITATION: Chronic | ICD-10-CM

## 2022-04-26 LAB — POTASSIUM SERPL-SCNC: 5.1 MMOL/L (ref 3.5–5.1)

## 2022-04-26 PROCEDURE — 36415 COLL VENOUS BLD VENIPUNCTURE: CPT | Mod: PO | Performed by: INTERNAL MEDICINE

## 2022-04-26 PROCEDURE — 99999 PR PBB SHADOW E&M-EST. PATIENT-LVL III: CPT | Mod: PBBFAC,,, | Performed by: INTERNAL MEDICINE

## 2022-04-26 PROCEDURE — 84132 ASSAY OF SERUM POTASSIUM: CPT | Performed by: INTERNAL MEDICINE

## 2022-04-26 PROCEDURE — 99214 PR OFFICE/OUTPT VISIT, EST, LEVL IV, 30-39 MIN: ICD-10-PCS | Mod: S$PBB,,, | Performed by: INTERNAL MEDICINE

## 2022-04-26 PROCEDURE — 99213 OFFICE O/P EST LOW 20 MIN: CPT | Mod: PBBFAC,PO | Performed by: INTERNAL MEDICINE

## 2022-04-26 PROCEDURE — 99999 PR PBB SHADOW E&M-EST. PATIENT-LVL III: ICD-10-PCS | Mod: PBBFAC,,, | Performed by: INTERNAL MEDICINE

## 2022-04-26 PROCEDURE — 99214 OFFICE O/P EST MOD 30 MIN: CPT | Mod: S$PBB,,, | Performed by: INTERNAL MEDICINE

## 2022-04-26 RX ORDER — PRAVASTATIN SODIUM 10 MG/1
10 TABLET ORAL DAILY
Qty: 90 TABLET | Refills: 1 | Status: SHIPPED | OUTPATIENT
Start: 2022-04-26 | End: 2022-10-24 | Stop reason: SDUPTHER

## 2022-04-26 RX ORDER — AMLODIPINE BESYLATE 5 MG/1
5 TABLET ORAL DAILY
Qty: 90 TABLET | Refills: 1 | Status: SHIPPED | OUTPATIENT
Start: 2022-04-26 | End: 2022-10-24 | Stop reason: SDUPTHER

## 2022-04-26 NOTE — PROGRESS NOTES
Subjective:    Patient ID:  Bev Miguel is a 71 y.o. female who presents for Hypertension        HPI  RECENT LABS IN December,  LDL 98 HDL 88, CMP OK EXCEPT POTASSIUM WAS 5.6, CBC OK, DOING WELL,STABLE RAYNAUD'S, +++ TOMATO,  STABLE     Past Medical History:   Diagnosis Date    Abnormal EKG     Allergy     perennial; sees Dr Jose ENT for injections    Asthma     mild interm    Breast CA     Cancer     ovarian    Chest pain     Chronic insomnia     Colonic polyp     Heart murmur     Hyperlipidemia     Hypoglycemia     Macrocytosis     Mild carotid artery disease 4/29/2019    Mitral valve regurgitation     Osteopenia     Ovarian cancer     SOB (shortness of breath)     Uterine cancer     Valvular regurgitation     Varicose vein of leg     Venous insufficiency     Vitamin C deficiency      Past Surgical History:   Procedure Laterality Date    BREAST SURGERY  05/28/2010    bilateral mastectomy/breast reconstruction; R breast ca.; no XRT/chemo. [facility] for 5 yrs.    hip replacement Right 2021    OOPHORECTOMY      sinuplasty      TC  02/20/2017    no polyps; GI/Dr March. 5 yr repeat.    TONSILLECTOMY      URETEROTOMY       Family History   Problem Relation Age of Onset    Early death Mother     Miscarriages / Stillbirths Mother     Cancer Father     Early death Father     Asthma Brother     Early death Brother      Social History     Socioeconomic History    Marital status:     Number of children: 2   Occupational History    Occupation: retired sales management   Tobacco Use    Smoking status: Current Every Day Smoker     Packs/day: 0.30     Years: 45.00     Pack years: 13.50     Types: Cigarettes    Smokeless tobacco: Never Used   Substance and Sexual Activity    Alcohol use: Yes     Alcohol/week: 2.0 standard drinks     Types: 2 Shots of liquor per week     Comment: 2 cocktails daily; wine 5 glasses a week.    Drug use: No     Social Determinants of Health      Financial Resource Strain: Low Risk     Difficulty of Paying Living Expenses: Not very hard   Food Insecurity: No Food Insecurity    Worried About Running Out of Food in the Last Year: Never true    Ran Out of Food in the Last Year: Never true   Transportation Needs: No Transportation Needs    Lack of Transportation (Medical): No    Lack of Transportation (Non-Medical): No   Physical Activity: Inactive    Days of Exercise per Week: 0 days    Minutes of Exercise per Session: 0 min   Stress: Stress Concern Present    Feeling of Stress : To some extent   Social Connections: Moderately Isolated    Frequency of Communication with Friends and Family: More than three times a week    Frequency of Social Gatherings with Friends and Family: Once a week    Attends Mosque Services: Never    Active Member of Clubs or Organizations: No    Attends Club or Organization Meetings: Never    Marital Status:        Review of patient's allergies indicates:   Allergen Reactions    Benadryl [diphenhydramine hcl]     Biaxin [clarithromycin]     Iodine and iodide containing products     Levaquin [levofloxacin]     Sulfa (sulfonamide antibiotics)     Trazodone     Zyban [bupropion hcl (smoking deter)]     Bactrim [sulfamethoxazole-trimethoprim] Rash    Gadolinium-containing contrast media Rash       Current Outpatient Medications:     alendronate (FOSAMAX) 70 MG tablet, Take 1 tablet (70 mg total) by mouth every 7 days., Disp: 15 tablet, Rfl: 1    ascorbate calcium-bioflavonoid 500-200 mg Tab, Take 1 tablet by mouth once daily., Disp: , Rfl:     aspirin (ECOTRIN) 81 MG EC tablet, Take 81 mg by mouth once daily., Disp: , Rfl:     CALCIUM CARB,CIT/D3/PHYTOSTROL (CITRACAL D + HEART HEALTH ORAL), Take 600 mg by mouth once daily., Disp: , Rfl:     cyanocobalamin (VITAMIN B-12) 1000 MCG tablet, Take 3,000 mcg by mouth once daily., Disp: , Rfl:     ibuprofen (ADVIL,MOTRIN) 200 MG tablet, Take 200 mg by  mouth every 6 (six) hours as needed for Pain., Disp: , Rfl:     Lactobacillus rhamnosus GG (CULTURELLE) 10 billion cell capsule, Take 1 capsule by mouth once daily., Disp: , Rfl:     melatonin 5 mg Cap, Take 5 mg by mouth once daily., Disp: , Rfl:     MULTIVIT-MIN/IRON/FOLIC/LUTEIN (CENTRUM SILVER WOMEN ORAL), Take 1 capsule by mouth once daily., Disp: , Rfl:     tumeric-ging-olive-oreg-capryl 100 mg-150 mg- 50 mg-150 mg Cap, Take by mouth., Disp: , Rfl:     vitamin D 1000 units Tab, Take 2,000 mg by mouth 2 (two) times a day., Disp: , Rfl:     vitamin E 400 UNIT capsule, Take 1,000 Units by mouth once daily. , Disp: , Rfl:     amLODIPine (NORVASC) 5 MG tablet, Take 1 tablet (5 mg total) by mouth once daily., Disp: 90 tablet, Rfl: 1    pravastatin (PRAVACHOL) 10 MG tablet, Take 1 tablet (10 mg total) by mouth once daily., Disp: 90 tablet, Rfl: 1    Review of Systems   Constitutional: Negative for chills, diaphoresis, fever, malaise/fatigue and night sweats.   HENT: Negative for sore throat.    Eyes: Negative.    Cardiovascular: Negative for chest pain, claudication, cyanosis, dyspnea on exertion, irregular heartbeat, leg swelling, near-syncope, orthopnea, palpitations, paroxysmal nocturnal dyspnea and syncope.   Respiratory: Negative for cough, hemoptysis, shortness of breath and wheezing.    Hematologic/Lymphatic: Negative for adenopathy. Does not bruise/bleed easily.   Skin: Negative for color change (TOES) and rash.   Musculoskeletal: Negative for back pain, falls and muscle weakness.   Gastrointestinal: Negative for abdominal pain, change in bowel habit, jaundice, melena and nausea.   Genitourinary: Negative for dysuria and flank pain.   Neurological: Negative for focal weakness, headaches, light-headedness, loss of balance, numbness and weakness.   Psychiatric/Behavioral: Negative for altered mental status and depression.   Allergic/Immunologic: Negative.         Objective:      Vitals:    04/26/22 1445  "  BP: 128/75   Pulse: 82   Weight: 73.2 kg (161 lb 6 oz)   Height: 5' 7" (1.702 m)   PainSc: 0-No pain     Body mass index is 25.28 kg/m².    Physical Exam  Constitutional:       Appearance: Normal appearance. She is well-developed.   HENT:      Head: Normocephalic and atraumatic.   Eyes:      Extraocular Movements: Extraocular movements intact.      Conjunctiva/sclera: Conjunctivae normal.      Pupils: Pupils are equal, round, and reactive to light.   Neck:      Vascular: Normal carotid pulses. No carotid bruit, hepatojugular reflux or JVD.      Trachea: No tracheal deviation.   Cardiovascular:      Rate and Rhythm: Normal rate and regular rhythm.      Pulses:           Carotid pulses are 2+ on the right side and 2+ on the left side.       Radial pulses are 2+ on the right side and 2+ on the left side.        Posterior tibial pulses are 2+ on the right side and 2+ on the left side.      Heart sounds: Murmur heard.    Systolic murmur is present with a grade of 1/6 at the lower left sternal border and apex.    No friction rub. No gallop.   Pulmonary:      Effort: Pulmonary effort is normal.      Breath sounds: Normal breath sounds. No rales.   Abdominal:      Palpations: Abdomen is soft. There is no hepatomegaly.      Tenderness: There is no abdominal tenderness.   Musculoskeletal:      Cervical back: Neck supple.      Right lower leg: No edema.      Left lower leg: No edema.   Skin:     General: Skin is warm and dry.      Capillary Refill: Capillary refill takes less than 2 seconds.      Comments:     Neurological:      General: No focal deficit present.      Mental Status: She is alert and oriented to person, place, and time.      Cranial Nerves: Cranial nerves are intact.   Psychiatric:         Mood and Affect: Mood normal.         Speech: Speech normal.         Behavior: Behavior normal.                 ..    Chemistry        Component Value Date/Time     12/06/2021 0730     12/06/2021 0730    K 5.1 " 04/26/2022 1517     12/06/2021 0730     12/06/2021 0730    CO2 23 12/06/2021 0730    CO2 23 12/06/2021 0730    BUN 15 12/06/2021 0730    BUN 15 12/06/2021 0730    CREATININE 0.7 12/06/2021 0730    CREATININE 0.7 12/06/2021 0730    GLU 92 12/06/2021 0730    GLU 92 12/06/2021 0730        Component Value Date/Time    CALCIUM 9.4 12/06/2021 0730    CALCIUM 9.4 12/06/2021 0730    ALKPHOS 68 12/06/2021 0730    ALKPHOS 68 12/06/2021 0730    AST 19 12/06/2021 0730    AST 19 12/06/2021 0730    AST 27 03/14/2016 1523    ALT 15 12/06/2021 0730    ALT 15 12/06/2021 0730    BILITOT 0.8 12/06/2021 0730    BILITOT 0.8 12/06/2021 0730    ESTGFRAFRICA >60.0 12/06/2021 0730    ESTGFRAFRICA >60.0 12/06/2021 0730    EGFRNONAA >60.0 12/06/2021 0730    EGFRNONAA >60.0 12/06/2021 0730            ..  Lab Results   Component Value Date    CHOL 198 12/06/2021    CHOL 213 (H) 07/09/2019    CHOL 177 04/05/2018     Lab Results   Component Value Date    HDL 88 (H) 12/06/2021    HDL 97 (H) 07/09/2019     (H) 04/05/2018     Lab Results   Component Value Date    LDLCALC 98.0 12/06/2021    LDLCALC 106.0 07/09/2019    LDLCALC 61.2 (L) 04/05/2018     Lab Results   Component Value Date    TRIG 60 12/06/2021    TRIG 50 07/09/2019    TRIG 74 04/05/2018     Lab Results   Component Value Date    CHOLHDL 44.4 12/06/2021    CHOLHDL 45.5 07/09/2019    CHOLHDL 57.1 (H) 04/05/2018     ..  Lab Results   Component Value Date    WBC 6.71 12/06/2021    WBC 6.71 12/06/2021    HGB 13.7 12/06/2021    HGB 13.7 12/06/2021    HCT 42.8 12/06/2021    HCT 42.8 12/06/2021     (H) 12/06/2021     (H) 12/06/2021     12/06/2021     12/06/2021       Test(s) Reviewed  I have reviewed the following in detail:  [] Stress test   [] Angiography   [] Echocardiogram   [x] Labs   [] Other:       Assessment:         ICD-10-CM ICD-9-CM   1. Hyperkalemia  E87.5 276.7   2. Atherosclerosis of aorta  I70.0 440.0   3. Non-rheumatic mitral  regurgitation  I34.0 424.0   4. Raynaud's disease without gangrene  I73.00 443.0   5. Mild carotid artery disease  I77.9 447.9   6. Tobacco use  Z72.0 305.1     Problem List Items Addressed This Visit        Cardiac/Vascular    Raynaud's disease without gangrene    Non-rheumatic mitral regurgitation    Mild carotid artery disease    Atherosclerosis of aorta    Overview     cxr 2019              Renal/    Hyperkalemia - Primary    Relevant Orders    POTASSIUM (Completed)       Other    Tobacco use           Plan:     K LEVEL, DECREASE INTAKE, EXPLAINED CONSIDER STATIN, WILL START LOW-DOSE, TOBACCO CESSATION, ALL CV CLINICALLY STABLE, NO ANGINA, NO HF, NO TIA, NO CLINICAL ARRHYTHMIA,CONTINUE CURRENT MEDS, EDUCATION, DIET, EXERCISE WALKING, AVOID SUDDEN CHANGE IN TEMPERATURE EXPOSURE TO COLD, EXCESSIVE TOBACCO CESSATION COUNSELING, RETURN TO 6 MO WE      Hyperkalemia  Comments:  RE-CHECK  Orders:  -     POTASSIUM; Future; Expected date: 04/26/2022    Atherosclerosis of aorta  Comments:  NO EMBOLIZATION    Non-rheumatic mitral regurgitation  Comments:  STABLE    Raynaud's disease without gangrene  Comments:  STABLE, PRECAUTION    Mild carotid artery disease  Comments:  NO TIA SYMPTOMS    Tobacco use  Comments:  COUNSELING    Other orders  -     pravastatin (PRAVACHOL) 10 MG tablet; Take 1 tablet (10 mg total) by mouth once daily.  Dispense: 90 tablet; Refill: 1  -     amLODIPine (NORVASC) 5 MG tablet; Take 1 tablet (5 mg total) by mouth once daily.  Dispense: 90 tablet; Refill: 1    RTC Low level/low impact aerobic exercise 5x's/wk. Heart healthy diet and risk factor modification.    See labs and med orders.    Aerobic exercise 5x's/wk. Heart healthy diet and risk factor modification.    See labs and med orders.

## 2022-05-09 ENCOUNTER — PATIENT MESSAGE (OUTPATIENT)
Dept: SMOKING CESSATION | Facility: CLINIC | Age: 72
End: 2022-05-09
Payer: MEDICARE

## 2022-07-01 ENCOUNTER — OFFICE VISIT (OUTPATIENT)
Dept: CARDIOLOGY | Facility: CLINIC | Age: 72
End: 2022-07-01
Payer: MEDICARE

## 2022-07-01 ENCOUNTER — LAB VISIT (OUTPATIENT)
Dept: LAB | Facility: HOSPITAL | Age: 72
End: 2022-07-01
Attending: INTERNAL MEDICINE
Payer: MEDICARE

## 2022-07-01 VITALS
DIASTOLIC BLOOD PRESSURE: 75 MMHG | SYSTOLIC BLOOD PRESSURE: 124 MMHG | HEART RATE: 87 BPM | WEIGHT: 163.13 LBS | BODY MASS INDEX: 25.6 KG/M2 | HEIGHT: 67 IN

## 2022-07-01 DIAGNOSIS — I73.00 RAYNAUD'S DISEASE WITHOUT GANGRENE: Chronic | ICD-10-CM

## 2022-07-01 DIAGNOSIS — Z71.6 TOBACCO ABUSE COUNSELING: Chronic | ICD-10-CM

## 2022-07-01 DIAGNOSIS — R55 NEAR SYNCOPE: Primary | ICD-10-CM

## 2022-07-01 DIAGNOSIS — I10 ESSENTIAL HYPERTENSION: ICD-10-CM

## 2022-07-01 DIAGNOSIS — I10 ESSENTIAL HYPERTENSION: Chronic | ICD-10-CM

## 2022-07-01 DIAGNOSIS — I34.0 NON-RHEUMATIC MITRAL REGURGITATION: Chronic | ICD-10-CM

## 2022-07-01 DIAGNOSIS — R55 NEAR SYNCOPE: ICD-10-CM

## 2022-07-01 DIAGNOSIS — I70.0 ATHEROSCLEROSIS OF AORTA: Chronic | ICD-10-CM

## 2022-07-01 LAB
BASOPHILS # BLD AUTO: 0.05 K/UL (ref 0–0.2)
BASOPHILS NFR BLD: 0.6 % (ref 0–1.9)
DIFFERENTIAL METHOD: ABNORMAL
EOSINOPHIL # BLD AUTO: 0.1 K/UL (ref 0–0.5)
EOSINOPHIL NFR BLD: 0.6 % (ref 0–8)
ERYTHROCYTE [DISTWIDTH] IN BLOOD BY AUTOMATED COUNT: 13.3 % (ref 11.5–14.5)
HCT VFR BLD AUTO: 40.7 % (ref 37–48.5)
HGB BLD-MCNC: 13.1 G/DL (ref 12–16)
IMM GRANULOCYTES # BLD AUTO: 0.03 K/UL (ref 0–0.04)
IMM GRANULOCYTES NFR BLD AUTO: 0.4 % (ref 0–0.5)
LYMPHOCYTES # BLD AUTO: 3.2 K/UL (ref 1–4.8)
LYMPHOCYTES NFR BLD: 37.9 % (ref 18–48)
MCH RBC QN AUTO: 32.6 PG (ref 27–31)
MCHC RBC AUTO-ENTMCNC: 32.2 G/DL (ref 32–36)
MCV RBC AUTO: 101 FL (ref 82–98)
MONOCYTES # BLD AUTO: 0.4 K/UL (ref 0.3–1)
MONOCYTES NFR BLD: 4.5 % (ref 4–15)
NEUTROPHILS # BLD AUTO: 4.7 K/UL (ref 1.8–7.7)
NEUTROPHILS NFR BLD: 56 % (ref 38–73)
NRBC BLD-RTO: 0 /100 WBC
PLATELET # BLD AUTO: 242 K/UL (ref 150–450)
PMV BLD AUTO: 11.8 FL (ref 9.2–12.9)
RBC # BLD AUTO: 4.02 M/UL (ref 4–5.4)
WBC # BLD AUTO: 8.37 K/UL (ref 3.9–12.7)

## 2022-07-01 PROCEDURE — 99999 PR PBB SHADOW E&M-EST. PATIENT-LVL III: ICD-10-PCS | Mod: PBBFAC,,, | Performed by: INTERNAL MEDICINE

## 2022-07-01 PROCEDURE — 99999 PR PBB SHADOW E&M-EST. PATIENT-LVL III: CPT | Mod: PBBFAC,,, | Performed by: INTERNAL MEDICINE

## 2022-07-01 PROCEDURE — 36415 COLL VENOUS BLD VENIPUNCTURE: CPT | Mod: PO | Performed by: INTERNAL MEDICINE

## 2022-07-01 PROCEDURE — 99213 OFFICE O/P EST LOW 20 MIN: CPT | Mod: PBBFAC,PO | Performed by: INTERNAL MEDICINE

## 2022-07-01 PROCEDURE — 85025 COMPLETE CBC W/AUTO DIFF WBC: CPT | Performed by: INTERNAL MEDICINE

## 2022-07-01 PROCEDURE — 99214 PR OFFICE/OUTPT VISIT, EST, LEVL IV, 30-39 MIN: ICD-10-PCS | Mod: S$PBB,,, | Performed by: INTERNAL MEDICINE

## 2022-07-01 PROCEDURE — 99214 OFFICE O/P EST MOD 30 MIN: CPT | Mod: S$PBB,,, | Performed by: INTERNAL MEDICINE

## 2022-07-01 NOTE — PROGRESS NOTES
Subjective:    Patient ID:  Bev Miguel is a 71 y.o. female who presents for Loss of Consciousness and Hypertension        HPI    REQUESTED OV, GOING TO [place], WORRIED, FEELS LIKE NEAR SYNCOPE AT TIME, LIGHTHEADEDNESS, NOT TAKING PRAVACHOL,STILL SMOKES,  POTASSIUM NORMALIZED WAS HIGH, SEE ROS  Past Medical History:   Diagnosis Date    Abnormal EKG     Allergy     perennial; sees Dr Jose ENT for injections    Asthma     mild interm    Breast CA     Cancer     ovarian    Chest pain     Chronic insomnia     Colonic polyp     Heart murmur     Hyperlipidemia     Hypoglycemia     Macrocytosis     Mild carotid artery disease 4/29/2019    Mitral valve regurgitation     Osteopenia     Ovarian cancer     SOB (shortness of breath)     Uterine cancer     Valvular regurgitation     Varicose vein of leg     Venous insufficiency     Vitamin C deficiency      Past Surgical History:   Procedure Laterality Date    BREAST SURGERY  05/28/2010    bilateral mastectomy/breast reconstruction; R breast ca.; no XRT/chemo. [facility] for 5 yrs.    hip replacement Right 2021    OOPHORECTOMY      sinuplasty      TC  02/20/2017    no polyps; GI/Dr March. 5 yr repeat.    TONSILLECTOMY      URETEROTOMY       Family History   Problem Relation Age of Onset    Early death Mother     Miscarriages / Stillbirths Mother     Cancer Father     Early death Father     Asthma Brother     Early death Brother      Social History     Socioeconomic History    Marital status:     Number of children: 2   Occupational History    Occupation: retired sales management   Tobacco Use    Smoking status: Current Every Day Smoker     Packs/day: 0.30     Years: 45.00     Pack years: 13.50     Types: Cigarettes    Smokeless tobacco: Never Used   Substance and Sexual Activity    Alcohol use: Yes     Alcohol/week: 2.0 standard drinks     Types: 2 Shots of liquor per week     Comment: 2 cocktails daily; wine 5 glasses a  week.    Drug use: No     Social Determinants of Health     Financial Resource Strain: Low Risk     Difficulty of Paying Living Expenses: Not very hard   Food Insecurity: No Food Insecurity    Worried About Running Out of Food in the Last Year: Never true    Ran Out of Food in the Last Year: Never true   Transportation Needs: No Transportation Needs    Lack of Transportation (Medical): No    Lack of Transportation (Non-Medical): No   Physical Activity: Inactive    Days of Exercise per Week: 0 days    Minutes of Exercise per Session: 0 min   Stress: Stress Concern Present    Feeling of Stress : To some extent   Social Connections: Moderately Isolated    Frequency of Communication with Friends and Family: More than three times a week    Frequency of Social Gatherings with Friends and Family: Once a week    Attends Nondenominational Services: Never    Active Member of Clubs or Organizations: No    Attends Club or Organization Meetings: Never    Marital Status:        Review of patient's allergies indicates:   Allergen Reactions    Benadryl [diphenhydramine hcl]     Biaxin [clarithromycin]     Iodine and iodide containing products     Levaquin [levofloxacin]     Sulfa (sulfonamide antibiotics)     Trazodone     Zyban [bupropion hcl (smoking deter)]     Bactrim [sulfamethoxazole-trimethoprim] Rash    Gadolinium-containing contrast media Rash       Current Outpatient Medications:     alendronate (FOSAMAX) 70 MG tablet, Take 1 tablet (70 mg total) by mouth every 7 days., Disp: 15 tablet, Rfl: 1    amLODIPine (NORVASC) 5 MG tablet, Take 1 tablet (5 mg total) by mouth once daily., Disp: 90 tablet, Rfl: 1    ascorbate calcium-bioflavonoid 500-200 mg Tab, Take 1 tablet by mouth once daily., Disp: , Rfl:     aspirin (ECOTRIN) 81 MG EC tablet, Take 81 mg by mouth once daily., Disp: , Rfl:     CALCIUM CARB,CIT/D3/PHYTOSTROL (CITRACAL D + HEART HEALTH ORAL), Take 600 mg by mouth once daily., Disp: ,  Rfl:     cyanocobalamin (VITAMIN B-12) 1000 MCG tablet, Take 3,000 mcg by mouth once daily., Disp: , Rfl:     ibuprofen (ADVIL,MOTRIN) 200 MG tablet, Take 200 mg by mouth every 6 (six) hours as needed for Pain., Disp: , Rfl:     Lactobacillus rhamnosus GG (CULTURELLE) 10 billion cell capsule, Take 1 capsule by mouth once daily., Disp: , Rfl:     melatonin 5 mg Cap, Take 5 mg by mouth once daily., Disp: , Rfl:     MULTIVIT-MIN/IRON/FOLIC/LUTEIN (CENTRUM SILVER WOMEN ORAL), Take 1 capsule by mouth once daily., Disp: , Rfl:     tumeric-ging-olive-oreg-capryl 100 mg-150 mg- 50 mg-150 mg Cap, Take by mouth., Disp: , Rfl:     vitamin D 1000 units Tab, Take 2,000 mg by mouth 2 (two) times a day., Disp: , Rfl:     vitamin E 400 UNIT capsule, Take 1,000 Units by mouth once daily. , Disp: , Rfl:     pravastatin (PRAVACHOL) 10 MG tablet, Take 1 tablet (10 mg total) by mouth once daily. (Patient not taking: Reported on 7/1/2022), Disp: 90 tablet, Rfl: 1    Review of Systems   Constitutional: Positive for malaise/fatigue. Negative for chills, diaphoresis, fever and night sweats.   HENT: Negative for sore throat.    Eyes: Negative for blurred vision and visual disturbance.   Cardiovascular: Positive for near-syncope. Negative for chest pain, claudication, cyanosis, dyspnea on exertion, irregular heartbeat, leg swelling, orthopnea, palpitations, paroxysmal nocturnal dyspnea and syncope.   Respiratory: Negative for cough, hemoptysis, shortness of breath and wheezing.    Hematologic/Lymphatic: Negative for adenopathy. Does not bruise/bleed easily.   Skin: Negative for color change (TOES,FINGERS) and rash.   Musculoskeletal: Negative for back pain, falls and muscle weakness.   Gastrointestinal: Negative for abdominal pain, change in bowel habit, jaundice, melena and nausea.   Genitourinary: Negative for dysuria and flank pain.   Neurological: Positive for light-headedness. Negative for focal weakness, headaches, loss of  "balance, numbness and weakness.   Psychiatric/Behavioral: Negative for altered mental status and depression.   Allergic/Immunologic: Negative for environmental allergies and persistent infections.        Objective:      Vitals:    07/01/22 1002   BP: 124/75   Pulse: 87   Weight: 74 kg (163 lb 2.3 oz)   Height: 5' 7" (1.702 m)   PainSc: 0-No pain     Body mass index is 25.55 kg/m².    Physical Exam  Constitutional:       Appearance: Normal appearance. She is well-developed.   HENT:      Head: Normocephalic and atraumatic.   Eyes:      General: No scleral icterus.     Extraocular Movements:      Right eye: Normal extraocular motion.      Pupils: Pupils are equal, round, and reactive to light.   Neck:      Vascular: Normal carotid pulses. No JVD.   Cardiovascular:      Rate and Rhythm: Normal rate and regular rhythm.  No extrasystoles are present.     Pulses:           Carotid pulses are 2+ on the right side and 2+ on the left side.       Radial pulses are 2+ on the right side and 2+ on the left side.        Dorsalis pedis pulses are 2+ on the right side and 2+ on the left side.        Posterior tibial pulses are 2+ on the right side and 2+ on the left side.      Heart sounds: Murmur heard.    Systolic murmur is present with a grade of 1/6 at the lower left sternal border.    No friction rub. No gallop. No S4 sounds.   Pulmonary:      Effort: Pulmonary effort is normal. No respiratory distress.      Breath sounds: Normal breath sounds. No rales.   Abdominal:      Palpations: Abdomen is soft. There is no hepatomegaly.      Tenderness: There is no abdominal tenderness.   Musculoskeletal:      Cervical back: Neck supple.      Right lower leg: No edema.      Left lower leg: No edema.   Skin:     General: Skin is warm and dry.      Capillary Refill: Capillary refill takes 2 to 3 seconds.      Comments:     Neurological:      General: No focal deficit present.      Mental Status: She is alert and oriented to person, place, " and time.   Psychiatric:         Mood and Affect: Mood normal.         Speech: Speech normal.         Behavior: Behavior normal.                 ..    Chemistry        Component Value Date/Time     12/06/2021 0730     12/06/2021 0730    K 5.1 04/26/2022 1517     12/06/2021 0730     12/06/2021 0730    CO2 23 12/06/2021 0730    CO2 23 12/06/2021 0730    BUN 15 12/06/2021 0730    BUN 15 12/06/2021 0730    CREATININE 0.7 12/06/2021 0730    CREATININE 0.7 12/06/2021 0730    GLU 92 12/06/2021 0730    GLU 92 12/06/2021 0730        Component Value Date/Time    CALCIUM 9.4 12/06/2021 0730    CALCIUM 9.4 12/06/2021 0730    ALKPHOS 68 12/06/2021 0730    ALKPHOS 68 12/06/2021 0730    AST 19 12/06/2021 0730    AST 19 12/06/2021 0730    AST 27 03/14/2016 1523    ALT 15 12/06/2021 0730    ALT 15 12/06/2021 0730    BILITOT 0.8 12/06/2021 0730    BILITOT 0.8 12/06/2021 0730    ESTGFRAFRICA >60.0 12/06/2021 0730    ESTGFRAFRICA >60.0 12/06/2021 0730    EGFRNONAA >60.0 12/06/2021 0730    EGFRNONAA >60.0 12/06/2021 0730            ..  Lab Results   Component Value Date    CHOL 198 12/06/2021    CHOL 213 (H) 07/09/2019    CHOL 177 04/05/2018     Lab Results   Component Value Date    HDL 88 (H) 12/06/2021    HDL 97 (H) 07/09/2019     (H) 04/05/2018     Lab Results   Component Value Date    LDLCALC 98.0 12/06/2021    LDLCALC 106.0 07/09/2019    LDLCALC 61.2 (L) 04/05/2018     Lab Results   Component Value Date    TRIG 60 12/06/2021    TRIG 50 07/09/2019    TRIG 74 04/05/2018     Lab Results   Component Value Date    CHOLHDL 44.4 12/06/2021    CHOLHDL 45.5 07/09/2019    CHOLHDL 57.1 (H) 04/05/2018     ..  Lab Results   Component Value Date    WBC 6.71 12/06/2021    WBC 6.71 12/06/2021    HGB 13.7 12/06/2021    HGB 13.7 12/06/2021    HCT 42.8 12/06/2021    HCT 42.8 12/06/2021     (H) 12/06/2021     (H) 12/06/2021     12/06/2021     12/06/2021       Test(s) Reviewed  I have reviewed  the following in detail:  [] Stress test   [] Angiography   [] Echocardiogram   [] Labs   [x] Other:       Assessment:         ICD-10-CM ICD-9-CM   1. Near syncope  R55 780.2   2. Atherosclerosis of aorta  I70.0 440.0   3. Non-rheumatic mitral regurgitation  I34.0 424.0   4. Raynaud's disease without gangrene  I73.00 443.0   5. Tobacco abuse counseling  Z71.6 V65.42     305.1   6. Essential hypertension  I10 401.9     Problem List Items Addressed This Visit        Cardiac/Vascular    Raynaud's disease without gangrene    Non-rheumatic mitral regurgitation    Relevant Orders    Stress Echo Which stress agent will be used? Treadmill Exercise; Color Flow Doppler? Yes    Essential hypertension    Relevant Orders    Comprehensive Metabolic Panel    Atherosclerosis of aorta    Overview     cxr 2019              Other    Tobacco use    Near syncope - Primary    Relevant Orders    Stress Echo Which stress agent will be used? Treadmill Exercise; Color Flow Doppler? Yes    CV Ultrasound Bilateral Doppler Carotid    Cardiac event monitor    Tilt table    Comprehensive Metabolic Panel    CBC Auto Differential           Plan:         WILL NEED EVALUATION CHECK STRESS ECHO SE, EVENT MONITOR, TILT, CAROTIDS, LABS, DEFINITE ELEMENT OF ANXIETY SPECIALLY BEFORE TRAVEL, NEEDS TO RESTART STATIN, TOBACCO CESSATION COUNSELING, NO OVERT HEART FAILURE NO ANGINA NO TYPE SYMPTOMS , RETURN TO CLINIC IN FEW WEEKS AFTER TESTS  Near syncope  Comments:  JUAREZ  Orders:  -     Stress Echo Which stress agent will be used? Treadmill Exercise; Color Flow Doppler? Yes; Future  -     CV Ultrasound Bilateral Doppler Carotid; Future  -     Cardiac event monitor; Future; Expected date: 07/08/2022  -     Tilt table; Future  -     Comprehensive Metabolic Panel; Future; Expected date: 07/01/2022  -     CBC Auto Differential; Future; Expected date: 07/01/2022    Atherosclerosis of aorta  Comments:  NO EMBOLIZATION    Non-rheumatic mitral regurgitation  -      Stress Echo Which stress agent will be used? Treadmill Exercise; Color Flow Doppler? Yes; Future    Raynaud's disease without gangrene    Tobacco abuse counseling    Essential hypertension  -     Comprehensive Metabolic Panel; Future; Expected date: 07/01/2022    RTC Low level/low impact aerobic exercise 5x's/wk. Heart healthy diet and risk factor modification.    See labs and med orders.    Aerobic exercise 5x's/wk. Heart healthy diet and risk factor modification.    See labs and med orders.

## 2022-07-07 ENCOUNTER — TELEPHONE (OUTPATIENT)
Dept: FAMILY MEDICINE | Facility: CLINIC | Age: 72
End: 2022-07-07
Payer: MEDICARE

## 2022-07-07 ENCOUNTER — CLINICAL SUPPORT (OUTPATIENT)
Dept: CARDIOLOGY | Facility: HOSPITAL | Age: 72
End: 2022-07-07
Attending: INTERNAL MEDICINE
Payer: MEDICARE

## 2022-07-07 DIAGNOSIS — R55 NEAR SYNCOPE: ICD-10-CM

## 2022-07-07 DIAGNOSIS — I34.0 NON-RHEUMATIC MITRAL REGURGITATION: ICD-10-CM

## 2022-07-07 NOTE — TELEPHONE ENCOUNTER
----- Message from Gillian Leo sent at 7/7/2022  3:25 PM CDT -----  Type:  Patient Returning Call         Who Called:  LYNDSAY BAXTER [[id number]]         Who Left Message for Patient: Raisa          Does the patient know what this is regarding?: no         Would the patient rather a call back or a response via [facility]?  Call back         Best Call Back Number:[phone]         Additional Information:

## 2022-07-11 ENCOUNTER — PATIENT MESSAGE (OUTPATIENT)
Dept: CARDIOLOGY | Facility: CLINIC | Age: 72
End: 2022-07-11
Payer: MEDICARE

## 2022-07-12 ENCOUNTER — OFFICE VISIT (OUTPATIENT)
Dept: FAMILY MEDICINE | Facility: CLINIC | Age: 72
End: 2022-07-12
Payer: MEDICARE

## 2022-07-12 VITALS
WEIGHT: 159.31 LBS | HEIGHT: 67 IN | SYSTOLIC BLOOD PRESSURE: 118 MMHG | DIASTOLIC BLOOD PRESSURE: 76 MMHG | HEART RATE: 85 BPM | OXYGEN SATURATION: 99 % | RESPIRATION RATE: 18 BRPM | BODY MASS INDEX: 25 KG/M2

## 2022-07-12 DIAGNOSIS — R55 SYNCOPE, CARDIOGENIC: Primary | ICD-10-CM

## 2022-07-12 DIAGNOSIS — J01.10 ACUTE FRONTAL SINUSITIS, RECURRENCE NOT SPECIFIED: ICD-10-CM

## 2022-07-12 PROCEDURE — 99214 OFFICE O/P EST MOD 30 MIN: CPT | Mod: PBBFAC,PO | Performed by: FAMILY MEDICINE

## 2022-07-12 PROCEDURE — 99999 PR PBB SHADOW E&M-EST. PATIENT-LVL IV: ICD-10-PCS | Mod: PBBFAC,,, | Performed by: FAMILY MEDICINE

## 2022-07-12 PROCEDURE — 99213 OFFICE O/P EST LOW 20 MIN: CPT | Mod: S$PBB,,, | Performed by: FAMILY MEDICINE

## 2022-07-12 PROCEDURE — 99213 PR OFFICE/OUTPT VISIT, EST, LEVL III, 20-29 MIN: ICD-10-PCS | Mod: S$PBB,,, | Performed by: FAMILY MEDICINE

## 2022-07-12 PROCEDURE — 99999 PR PBB SHADOW E&M-EST. PATIENT-LVL IV: CPT | Mod: PBBFAC,,, | Performed by: FAMILY MEDICINE

## 2022-07-12 RX ORDER — CALCIUM CARBONATE 600 MG
600 TABLET ORAL ONCE
COMMUNITY

## 2022-07-12 RX ORDER — IBUPROFEN 100 MG/5ML
1000 SUSPENSION, ORAL (FINAL DOSE FORM) ORAL DAILY
COMMUNITY

## 2022-07-12 NOTE — PROGRESS NOTES
"Subjective:       Patient ID: Bev Miguel is a 71 y.o. female.    Chief Complaint: Dizziness (Recent sinusitis) and Anxiety (Trip to [place] in August.)    Pleasant 71-year-old patient presents today to be sure she is doing okay for a trip coming up bottle of wine.  She has just been diagnosed with cardiogenic syncope by tilt-table test.  She has a stress test she or next week and a carotid ultrasound scheduled for August.  She is asymptomatic in terms of CAD or CHF.  She does have a cough and treated for sinus infection with steroid injectable and amoxicillin.  She is wondering if she should go on the trip.  She is not experiencing any significant CAD or CHF right now.  No signs of active cardiac disease.  In my opinion with reasonable medical certainty she should go.    Review of Systems   Constitutional: Negative.    HENT: Negative.    Eyes: Negative.    Respiratory: Negative.    Cardiovascular: Negative.    Gastrointestinal: Negative.    Endocrine: Negative.    Genitourinary: Negative.    Musculoskeletal: Negative.    Skin: Negative.    Allergic/Immunologic: Negative.    Neurological: Negative.    Hematological: Negative.    Psychiatric/Behavioral: Negative.        Objective:      Vitals:    07/12/22 1047   BP: 118/76   BP Location: Left arm   Patient Position: Sitting   Pulse: 85   Resp: 18   SpO2: 99%   Weight: 72.3 kg (159 lb 4.5 oz)   Height: 5' 7" (1.702 m)      Physical Exam  Vitals and nursing note reviewed.   Constitutional:       Appearance: Normal appearance. She is normal weight.   HENT:      Head: Normocephalic and atraumatic.      Nose: Nose normal.      Mouth/Throat:      Mouth: Mucous membranes are moist.      Pharynx: Oropharynx is clear.   Eyes:      Extraocular Movements: Extraocular movements intact.      Conjunctiva/sclera: Conjunctivae normal.      Pupils: Pupils are equal, round, and reactive to light.   Cardiovascular:      Rate and Rhythm: Normal rate and regular rhythm.      Pulses: " Normal pulses.      Heart sounds: Normal heart sounds.   Pulmonary:      Effort: Pulmonary effort is normal.      Breath sounds: Normal breath sounds.   Abdominal:      General: Abdomen is flat. Bowel sounds are normal.      Palpations: Abdomen is soft.   Musculoskeletal:         General: Normal range of motion.      Cervical back: Normal range of motion and neck supple.   Skin:     General: Skin is warm and dry.      Capillary Refill: Capillary refill takes less than 2 seconds.   Neurological:      General: No focal deficit present.      Mental Status: She is alert and oriented to person, place, and time. Mental status is at baseline.   Psychiatric:         Mood and Affect: Mood normal.         Behavior: Behavior normal.         Thought Content: Thought content normal.         Judgment: Judgment normal.         Results for orders placed or performed in visit on 07/01/22   CBC Auto Differential   Result Value Ref Range    WBC 8.37 3.90 - 12.70 K/uL    RBC 4.02 4.00 - 5.40 M/uL    Hemoglobin 13.1 12.0 - 16.0 g/dL    Hematocrit 40.7 37.0 - 48.5 %     (H) 82 - 98 fL    MCH 32.6 (H) 27.0 - 31.0 pg    MCHC 32.2 32.0 - 36.0 g/dL    RDW 13.3 11.5 - 14.5 %    Platelets 242 150 - 450 K/uL    MPV 11.8 9.2 - 12.9 fL    Immature Granulocytes 0.4 0.0 - 0.5 %    Gran # (ANC) 4.7 1.8 - 7.7 K/uL    Immature Grans (Abs) 0.03 0.00 - 0.04 K/uL    Lymph # 3.2 1.0 - 4.8 K/uL    Mono # 0.4 0.3 - 1.0 K/uL    Eos # 0.1 0.0 - 0.5 K/uL    Baso # 0.05 0.00 - 0.20 K/uL    nRBC 0 0 /100 WBC    Gran % 56.0 38.0 - 73.0 %    Lymph % 37.9 18.0 - 48.0 %    Mono % 4.5 4.0 - 15.0 %    Eosinophil % 0.6 0.0 - 8.0 %    Basophil % 0.6 0.0 - 1.9 %    Differential Method Automated       Assessment:       1. Syncope, cardiogenic    2. Acute frontal sinusitis, recurrence not specified        Plan:       Syncope, cardiogenic  Comments:  Stable, Cardiac work up ongoing.     Acute frontal sinusitis, recurrence not specified  Comments:  Under treatment  now.          Medication List with Changes/Refills   Current Medications    ALENDRONATE (FOSAMAX) 70 MG TABLET    Take 1 tablet (70 mg total) by mouth every 7 days.    AMLODIPINE (NORVASC) 5 MG TABLET    Take 1 tablet (5 mg total) by mouth once daily.    ASCORBIC ACID, VITAMIN C, (VITAMIN C) 1000 MG TABLET    Take 1,000 mg by mouth once daily.    ASPIRIN (ECOTRIN) 81 MG EC TABLET    Take 81 mg by mouth once daily.    CALCIUM CARBONATE (OS-ALEK) 600 MG CALCIUM (1,500 MG) TAB    Take 600 mg by mouth once.    CYANOCOBALAMIN (VITAMIN B-12) 1000 MCG TABLET    Take 3,000 mcg by mouth once daily.    IBUPROFEN (ADVIL,MOTRIN) 200 MG TABLET    Take 200 mg by mouth every 6 (six) hours as needed for Pain.    LACTOBACILLUS RHAMNOSUS GG (CULTURELLE) 10 BILLION CELL CAPSULE    Take 1 capsule by mouth once daily.    MELATONIN 5 MG CAP    Take 5 mg by mouth once daily.    MULTIVIT-MIN/IRON/FOLIC/LUTEIN (CENTRUM SILVER WOMEN ORAL)    Take 1 capsule by mouth once daily.    PRAVASTATIN (PRAVACHOL) 10 MG TABLET    Take 1 tablet (10 mg total) by mouth once daily.    UNABLE TO FIND    medication name:collagen    VITAMIN D 1000 UNITS TAB    Take 2,000 mg by mouth 2 (two) times a day.    VITAMIN E 400 UNIT CAPSULE    Take 1,000 Units by mouth once daily.    Discontinued Medications    ASCORBATE CALCIUM-BIOFLAVONOID 500-200 MG TAB    Take 1 tablet by mouth once daily.    CALCIUM CARB,CIT/D3/PHYTOSTROL (CITRACAL D + HEART HEALTH ORAL)    Take 600 mg by mouth once daily.    TUMERIC-GING-OLIVE-OREG-CAPRYL 100 MG-150 MG- 50 MG-150 MG CAP    Take by mouth.

## 2022-07-18 ENCOUNTER — CLINICAL SUPPORT (OUTPATIENT)
Dept: CARDIOLOGY | Facility: HOSPITAL | Age: 72
End: 2022-07-18
Attending: INTERNAL MEDICINE
Payer: MEDICARE

## 2022-07-18 ENCOUNTER — OFFICE VISIT (OUTPATIENT)
Dept: CARDIOLOGY | Facility: CLINIC | Age: 72
End: 2022-07-18
Payer: MEDICARE

## 2022-07-18 VITALS
BODY MASS INDEX: 24.96 KG/M2 | SYSTOLIC BLOOD PRESSURE: 126 MMHG | WEIGHT: 159 LBS | DIASTOLIC BLOOD PRESSURE: 72 MMHG | HEIGHT: 67 IN

## 2022-07-18 VITALS
WEIGHT: 160.06 LBS | BODY MASS INDEX: 25.12 KG/M2 | HEART RATE: 82 BPM | HEIGHT: 67 IN | SYSTOLIC BLOOD PRESSURE: 120 MMHG | DIASTOLIC BLOOD PRESSURE: 69 MMHG

## 2022-07-18 DIAGNOSIS — I34.0 NON-RHEUMATIC MITRAL REGURGITATION: ICD-10-CM

## 2022-07-18 DIAGNOSIS — I10 ESSENTIAL HYPERTENSION: ICD-10-CM

## 2022-07-18 DIAGNOSIS — Z72.0 TOBACCO USE: ICD-10-CM

## 2022-07-18 DIAGNOSIS — R94.09 ABNORMAL TILT TABLE TEST: Primary | ICD-10-CM

## 2022-07-18 DIAGNOSIS — I70.0 ATHEROSCLEROSIS OF AORTA: ICD-10-CM

## 2022-07-18 LAB
ASCENDING AORTA: 2.87 CM
AV INDEX (PROSTH): 0.69
AV MEAN GRADIENT: 3 MMHG
AV PEAK GRADIENT: 6 MMHG
AV VALVE AREA: 2.33 CM2
AV VELOCITY RATIO: 0.72
BSA FOR ECHO PROCEDURE: 1.85 M2
CV ECHO LV RWT: 0.38 CM
CV STRESS BASE HR: 87 BPM
DIASTOLIC BLOOD PRESSURE: 72 MMHG
DOP CALC AO PEAK VEL: 1.18 M/S
DOP CALC AO VTI: 27.97 CM
DOP CALC LVOT AREA: 3.4 CM2
DOP CALC LVOT DIAMETER: 2.08 CM
DOP CALC LVOT PEAK VEL: 0.85 M/S
DOP CALC LVOT STROKE VOLUME: 65.17 CM3
DOP CALCLVOT PEAK VEL VTI: 19.19 CM
E WAVE DECELERATION TIME: 193.26 MSEC
E/A RATIO: 0.85
E/E' RATIO: 10.53 M/S
ECHO LV POSTERIOR WALL: 0.96 CM (ref 0.6–1.1)
EJECTION FRACTION: 55 %
FRACTIONAL SHORTENING: 26 % (ref 28–44)
INTERVENTRICULAR SEPTUM: 0.98 CM (ref 0.6–1.1)
IVRT: 106.57 MSEC
LA MAJOR: 5.68 CM
LA MINOR: 5.42 CM
LA WIDTH: 3.26 CM
LEFT ATRIUM SIZE: 3.02 CM
LEFT ATRIUM VOLUME INDEX: 25.4 ML/M2
LEFT ATRIUM VOLUME: 46.42 CM3
LEFT INTERNAL DIMENSION IN SYSTOLE: 3.74 CM (ref 2.1–4)
LEFT VENTRICLE DIASTOLIC VOLUME INDEX: 66.4 ML/M2
LEFT VENTRICLE DIASTOLIC VOLUME: 121.52 ML
LEFT VENTRICLE MASS INDEX: 97 G/M2
LEFT VENTRICLE SYSTOLIC VOLUME INDEX: 32.5 ML/M2
LEFT VENTRICLE SYSTOLIC VOLUME: 59.49 ML
LEFT VENTRICULAR INTERNAL DIMENSION IN DIASTOLE: 5.06 CM (ref 3.5–6)
LEFT VENTRICULAR MASS: 178.19 G
LV LATERAL E/E' RATIO: 9.88 M/S
LV SEPTAL E/E' RATIO: 11.29 M/S
MV A" WAVE DURATION": 5.9 MSEC
MV PEAK A VEL: 0.93 M/S
MV PEAK E VEL: 0.79 M/S
MV STENOSIS PRESSURE HALF TIME: 56.04 MS
MV VALVE AREA P 1/2 METHOD: 3.93 CM2
OHS CV CPX 1 MINUTE RECOVERY HEART RATE: 106 BPM
OHS CV CPX 85 PERCENT MAX PREDICTED HEART RATE MALE: 123
OHS CV CPX ESTIMATED METS: 9
OHS CV CPX MAX PREDICTED HEART RATE: 145
OHS CV CPX PATIENT IS FEMALE: 1
OHS CV CPX PATIENT IS MALE: 0
OHS CV CPX PEAK DIASTOLIC BLOOD PRESSURE: 68 MMHG
OHS CV CPX PEAK HEAR RATE: 141 BPM
OHS CV CPX PEAK RATE PRESSURE PRODUCT: NORMAL
OHS CV CPX PEAK SYSTOLIC BLOOD PRESSURE: 168 MMHG
OHS CV CPX PERCENT MAX PREDICTED HEART RATE ACHIEVED: 97
OHS CV CPX RATE PRESSURE PRODUCT PRESENTING: NORMAL
PISA TR MAX VEL: 2.31 M/S
PULM VEIN S/D RATIO: 1.7
PV PEAK D VEL: 0.2 M/S
PV PEAK S VEL: 0.34 M/S
RA MAJOR: 4.59 CM
RA PRESSURE: 3 MMHG
RA WIDTH: 4.16 CM
RIGHT VENTRICULAR END-DIASTOLIC DIMENSION: 3.79 CM
RV TISSUE DOPPLER FREE WALL SYSTOLIC VELOCITY 1 (APICAL 4 CHAMBER VIEW): 12.69 CM/S
SINUS: 3.02 CM
STJ: 2.7 CM
STRESS ECHO POST EXERCISE DUR MIN: 5 MINUTES
STRESS ECHO POST EXERCISE DUR SEC: 17 SECONDS
SYSTOLIC BLOOD PRESSURE: 126 MMHG
TDI LATERAL: 0.08 M/S
TDI SEPTAL: 0.07 M/S
TDI: 0.08 M/S
TR MAX PG: 21 MMHG
TRICUSPID ANNULAR PLANE SYSTOLIC EXCURSION: 1.93 CM
TV REST PULMONARY ARTERY PRESSURE: 24 MMHG

## 2022-07-18 PROCEDURE — 99999 PR PBB SHADOW E&M-EST. PATIENT-LVL III: CPT | Mod: PBBFAC,,, | Performed by: INTERNAL MEDICINE

## 2022-07-18 PROCEDURE — 99999 PR PBB SHADOW E&M-EST. PATIENT-LVL III: ICD-10-PCS | Mod: PBBFAC,,, | Performed by: INTERNAL MEDICINE

## 2022-07-18 PROCEDURE — 99213 OFFICE O/P EST LOW 20 MIN: CPT | Mod: S$PBB,,, | Performed by: INTERNAL MEDICINE

## 2022-07-18 PROCEDURE — 99213 PR OFFICE/OUTPT VISIT, EST, LEVL III, 20-29 MIN: ICD-10-PCS | Mod: S$PBB,,, | Performed by: INTERNAL MEDICINE

## 2022-07-18 PROCEDURE — 99213 OFFICE O/P EST LOW 20 MIN: CPT | Mod: PBBFAC,25,PO | Performed by: INTERNAL MEDICINE

## 2022-07-18 PROCEDURE — 93325 DOPPLER ECHO COLOR FLOW MAPG: CPT | Mod: PO

## 2022-07-18 RX ORDER — MIDODRINE HYDROCHLORIDE 2.5 MG/1
2.5 TABLET ORAL EVERY 12 HOURS PRN
Qty: 60 TABLET | Refills: 1 | Status: SHIPPED | OUTPATIENT
Start: 2022-07-18 | End: 2022-11-02

## 2022-07-18 NOTE — PROGRESS NOTES
Subjective:    Patient ID:  Bev Miguel is a 71 y.o. female who presents for Near syncope        HPI  ABNORMAL TILT-TABLE TEST WITH DROP IN THE BLOOD PRESSURE, STRESS ECHO NOT DONE, DOING OK, SEE ROS    Past Medical History:   Diagnosis Date    Abnormal EKG     Allergy     perennial; sees Dr Jose ENT for injections    Asthma     mild interm    Breast CA     Cancer     ovarian    Chest pain     Chronic insomnia     Colonic polyp     Heart murmur     Hyperlipidemia     Hypoglycemia     Macrocytosis     Mild carotid artery disease 4/29/2019    Mitral valve regurgitation     Osteopenia     Ovarian cancer     SOB (shortness of breath)     Uterine cancer     Valvular regurgitation     Varicose vein of leg     Venous insufficiency     Vitamin C deficiency      Past Surgical History:   Procedure Laterality Date    BREAST SURGERY  05/28/2010    bilateral mastectomy/breast reconstruction; R breast ca.; no XRT/chemo. [facility] for 5 yrs.    hip replacement Right 2021    OOPHORECTOMY      sinuplasty      TC  02/20/2017    no polyps; GI/Dr March. 5 yr repeat.    TONSILLECTOMY      URETEROTOMY       Family History   Problem Relation Age of Onset    Early death Mother     Miscarriages / Stillbirths Mother     Cancer Father     Early death Father     Asthma Brother     Early death Brother      Social History     Socioeconomic History    Marital status:     Number of children: 2   Occupational History    Occupation: retired [employer] management   Tobacco Use    Smoking status: Current Every Day Smoker     Packs/day: 0.25     Years: 45.00     Pack years: 11.25     Types: Cigarettes    Smokeless tobacco: Never Used   Substance and Sexual Activity    Alcohol use: Yes     Alcohol/week: 2.0 standard drinks     Types: 2 Shots of liquor per week     Comment: 2 cocktails daily; wine 5 glasses a week.    Drug use: No     Social Determinants of Health     Financial Resource Strain: Low Risk      Difficulty of Paying Living Expenses: Not very hard   Food Insecurity: No Food Insecurity    Worried About Running Out of Food in the Last Year: Never true    Ran Out of Food in the Last Year: Never true   Transportation Needs: No Transportation Needs    Lack of Transportation (Medical): No    Lack of Transportation (Non-Medical): No   Physical Activity: Inactive    Days of Exercise per Week: 0 days    Minutes of Exercise per Session: 0 min   Stress: Stress Concern Present    Feeling of Stress : To some extent   Social Connections: Moderately Isolated    Frequency of Communication with Friends and Family: More than three times a week    Frequency of Social Gatherings with Friends and Family: Once a week    Attends Evangelical Services: Never    Active Member of Clubs or Organizations: No    Attends Club or Organization Meetings: Never    Marital Status:        Review of patient's allergies indicates:   Allergen Reactions    Benadryl [diphenhydramine hcl]     Biaxin [clarithromycin]     Iodine and iodide containing products     Levaquin [levofloxacin]     Sulfa (sulfonamide antibiotics)     Trazodone     Zyban [bupropion hcl (smoking deter)]     Bactrim [sulfamethoxazole-trimethoprim] Rash    Gadolinium-containing contrast media Rash       Current Outpatient Medications:     alendronate (FOSAMAX) 70 MG tablet, Take 1 tablet (70 mg total) by mouth every 7 days., Disp: 15 tablet, Rfl: 1    amLODIPine (NORVASC) 5 MG tablet, Take 1 tablet (5 mg total) by mouth once daily., Disp: 90 tablet, Rfl: 1    ascorbic acid, vitamin C, (VITAMIN C) 1000 MG tablet, Take 1,000 mg by mouth once daily., Disp: , Rfl:     aspirin (ECOTRIN) 81 MG EC tablet, Take 81 mg by mouth once daily., Disp: , Rfl:     calcium carbonate (OS-ALEK) 600 mg calcium (1,500 mg) Tab, Take 600 mg by mouth once., Disp: , Rfl:     cyanocobalamin (VITAMIN B-12) 1000 MCG tablet, Take 3,000 mcg by mouth once daily., Disp: , Rfl:      ibuprofen (ADVIL,MOTRIN) 200 MG tablet, Take 200 mg by mouth every 6 (six) hours as needed for Pain., Disp: , Rfl:     Lactobacillus rhamnosus GG (CULTURELLE) 10 billion cell capsule, Take 1 capsule by mouth once daily., Disp: , Rfl:     melatonin 5 mg Cap, Take 5 mg by mouth once daily., Disp: , Rfl:     MULTIVIT-MIN/IRON/FOLIC/LUTEIN (CENTRUM SILVER WOMEN ORAL), Take 1 capsule by mouth once daily., Disp: , Rfl:     pravastatin (PRAVACHOL) 10 MG tablet, Take 1 tablet (10 mg total) by mouth once daily., Disp: 90 tablet, Rfl: 1    UNABLE TO FIND, medication name:collagen, Disp: , Rfl:     vitamin D 1000 units Tab, Take 2,000 mg by mouth 2 (two) times a day., Disp: , Rfl:     vitamin E 400 UNIT capsule, Take 1,000 Units by mouth once daily. , Disp: , Rfl:     midodrine (PROAMATINE) 2.5 MG Tab, Take 1 tablet (2.5 mg total) by mouth every 12 (twelve) hours as needed (LOW BP)., Disp: 60 tablet, Rfl: 1    Review of Systems   Constitutional: Negative for chills, diaphoresis, fever, malaise/fatigue and night sweats.   HENT: Negative for congestion (BETTER, SINUS) and sore throat.    Eyes: Negative for blurred vision and visual disturbance.   Cardiovascular: Negative for chest pain, claudication, cyanosis, dyspnea on exertion, irregular heartbeat, leg swelling, near-syncope, orthopnea, palpitations, paroxysmal nocturnal dyspnea and syncope.   Respiratory: Negative for cough, hemoptysis, shortness of breath and wheezing.    Hematologic/Lymphatic: Negative for adenopathy. Does not bruise/bleed easily.   Skin: Negative for color change (TOES,FINGERS) and rash.   Musculoskeletal: Negative for back pain, falls and muscle weakness.   Gastrointestinal: Negative for abdominal pain, change in bowel habit, dysphagia, jaundice, melena and nausea.   Genitourinary: Negative for dysuria and flank pain.   Neurological: Negative for focal weakness, headaches, light-headedness, loss of balance, numbness and weakness.  "  Psychiatric/Behavioral: Negative for altered mental status and depression.   Allergic/Immunologic: Negative for hives and persistent infections.        Objective:      Vitals:    07/18/22 1332   BP: 120/69   Pulse: 82   Weight: 72.6 kg (160 lb 0.9 oz)   Height: 5' 7" (1.702 m)   PainSc: 0-No pain     Body mass index is 25.07 kg/m².    Physical Exam  Constitutional:       Appearance: Normal appearance. She is well-developed.   HENT:      Head: Normocephalic and atraumatic.   Eyes:      Extraocular Movements:      Right eye: Normal extraocular motion.      Conjunctiva/sclera: Conjunctivae normal.      Pupils: Pupils are equal, round, and reactive to light.   Neck:      Vascular: Normal carotid pulses. No carotid bruit or JVD.   Cardiovascular:      Rate and Rhythm: Normal rate and regular rhythm.  No extrasystoles are present.     Pulses:           Carotid pulses are 2+ on the right side and 2+ on the left side.       Radial pulses are 2+ on the right side and 2+ on the left side.        Posterior tibial pulses are 2+ on the right side and 2+ on the left side.      Heart sounds: Murmur heard.    Systolic murmur is present with a grade of 1/6 at the lower left sternal border and apex.    No friction rub. No gallop. No S4 sounds.   Pulmonary:      Effort: Pulmonary effort is normal.      Breath sounds: Normal breath sounds. No rales.   Abdominal:      Palpations: Abdomen is soft. There is no hepatomegaly.      Tenderness: There is no abdominal tenderness.   Musculoskeletal:      Cervical back: Neck supple.      Right lower leg: No edema.      Left lower leg: No edema.   Skin:     General: Skin is warm and dry.      Comments:     Neurological:      General: No focal deficit present.      Mental Status: She is alert and oriented to person, place, and time.   Psychiatric:         Mood and Affect: Mood normal.         Speech: Speech normal.         Behavior: Behavior normal.                 ..    Chemistry      "   Component Value Date/Time     12/06/2021 0730     12/06/2021 0730    K 5.1 04/26/2022 1517     12/06/2021 0730     12/06/2021 0730    CO2 23 12/06/2021 0730    CO2 23 12/06/2021 0730    BUN 15 12/06/2021 0730    BUN 15 12/06/2021 0730    CREATININE 0.7 12/06/2021 0730    CREATININE 0.7 12/06/2021 0730    GLU 92 12/06/2021 0730    GLU 92 12/06/2021 0730        Component Value Date/Time    CALCIUM 9.4 12/06/2021 0730    CALCIUM 9.4 12/06/2021 0730    ALKPHOS 68 12/06/2021 0730    ALKPHOS 68 12/06/2021 0730    AST 19 12/06/2021 0730    AST 19 12/06/2021 0730    AST 27 03/14/2016 1523    ALT 15 12/06/2021 0730    ALT 15 12/06/2021 0730    BILITOT 0.8 12/06/2021 0730    BILITOT 0.8 12/06/2021 0730    ESTGFRAFRICA >60.0 12/06/2021 0730    ESTGFRAFRICA >60.0 12/06/2021 0730    EGFRNONAA >60.0 12/06/2021 0730    EGFRNONAA >60.0 12/06/2021 0730            ..  Lab Results   Component Value Date    CHOL 198 12/06/2021    CHOL 213 (H) 07/09/2019    CHOL 177 04/05/2018     Lab Results   Component Value Date    HDL 88 (H) 12/06/2021    HDL 97 (H) 07/09/2019     (H) 04/05/2018     Lab Results   Component Value Date    LDLCALC 98.0 12/06/2021    LDLCALC 106.0 07/09/2019    LDLCALC 61.2 (L) 04/05/2018     Lab Results   Component Value Date    TRIG 60 12/06/2021    TRIG 50 07/09/2019    TRIG 74 04/05/2018     Lab Results   Component Value Date    CHOLHDL 44.4 12/06/2021    CHOLHDL 45.5 07/09/2019    CHOLHDL 57.1 (H) 04/05/2018     ..  Lab Results   Component Value Date    WBC 8.37 07/01/2022    HGB 13.1 07/01/2022    HCT 40.7 07/01/2022     (H) 07/01/2022     07/01/2022       Test(s) Reviewed  I have reviewed the following in detail:  [x] Stress test   [] Angiography   [x] Echocardiogram   [] Labs   [x] Other:       Assessment:         ICD-10-CM ICD-9-CM   1. Abnormal tilt table test  R94.09 794.09   2. Non-rheumatic mitral regurgitation  I34.0 424.0   3. Atherosclerosis of aorta  I70.0  440.0   4. Essential hypertension  I10 401.9   5. Tobacco use  Z72.0 305.1     Problem List Items Addressed This Visit        Neuro    Abnormal tilt table test - Primary       Cardiac/Vascular    Non-rheumatic mitral regurgitation    Essential hypertension    Atherosclerosis of aorta    Overview     cxr 2019              Other    Tobacco use           Plan:         HYDRATION, DECREASE ETOH, TOBACCO CESSATION, PRN MIDODRINE PRN , TRAVELING, DO A YEAR AGO, NO ANGINA NO HEART FAILURE NO APPARENT ARRHYTHMIA NO TIA TYPE SYMPTOMS, RTC IN 3 MO  Abnormal tilt table test    Non-rheumatic mitral regurgitation    Atherosclerosis of aorta    Essential hypertension    Tobacco use    Other orders  -     midodrine (PROAMATINE) 2.5 MG Tab; Take 1 tablet (2.5 mg total) by mouth every 12 (twelve) hours as needed (LOW BP).  Dispense: 60 tablet; Refill: 1    RTC Low level/low impact aerobic exercise 5x's/wk. Heart healthy diet and risk factor modification.    See labs and med orders.    Aerobic exercise 5x's/wk. Heart healthy diet and risk factor modification.    See labs and med orders.

## 2022-07-19 ENCOUNTER — CLINICAL SUPPORT (OUTPATIENT)
Dept: CARDIOLOGY | Facility: HOSPITAL | Age: 72
End: 2022-07-19
Attending: INTERNAL MEDICINE
Payer: MEDICARE

## 2022-07-19 DIAGNOSIS — R55 NEAR SYNCOPE: ICD-10-CM

## 2022-07-19 LAB
LEFT ARM DIASTOLIC BLOOD PRESSURE: 69 MMHG
LEFT ARM SYSTOLIC BLOOD PRESSURE: 120 MMHG
LEFT CBA DIAS: 21 CM/S
LEFT CBA SYS: 78 CM/S
LEFT CCA DIST DIAS: 21 CM/S
LEFT CCA DIST SYS: 81 CM/S
LEFT CCA MID DIAS: 17 CM/S
LEFT CCA MID SYS: 84 CM/S
LEFT CCA PROX DIAS: 10 CM/S
LEFT CCA PROX SYS: 66 CM/S
LEFT ECA DIAS: 3 CM/S
LEFT ECA SYS: 56 CM/S
LEFT ICA DIST DIAS: 19 CM/S
LEFT ICA DIST SYS: 73 CM/S
LEFT ICA MID DIAS: 18 CM/S
LEFT ICA MID SYS: 85 CM/S
LEFT ICA PROX DIAS: 14 CM/S
LEFT ICA PROX SYS: 57 CM/S
LEFT VERTEBRAL DIAS: 10 CM/S
LEFT VERTEBRAL SYS: 44 CM/S
OHS CV CAROTID RIGHT ICA EDV HIGHEST: 21
OHS CV CAROTID ULTRASOUND LEFT ICA/CCA RATIO: 1.05
OHS CV CAROTID ULTRASOUND RIGHT ICA/CCA RATIO: 1.28
OHS CV PV CAROTID LEFT HIGHEST CCA: 84
OHS CV PV CAROTID LEFT HIGHEST ICA: 85
OHS CV PV CAROTID RIGHT HIGHEST CCA: 109
OHS CV PV CAROTID RIGHT HIGHEST ICA: 78
OHS CV US CAROTID LEFT HIGHEST EDV: 19
RIGHT ARM DIASTOLIC BLOOD PRESSURE: 59 MMHG
RIGHT ARM SYSTOLIC BLOOD PRESSURE: 120 MMHG
RIGHT CBA DIAS: 17 CM/S
RIGHT CBA SYS: 70 CM/S
RIGHT CCA DIST DIAS: 13 CM/S
RIGHT CCA DIST SYS: 61 CM/S
RIGHT CCA MID DIAS: 14 CM/S
RIGHT CCA MID SYS: 109 CM/S
RIGHT CCA PROX DIAS: 13 CM/S
RIGHT CCA PROX SYS: 74 CM/S
RIGHT ECA DIAS: 10 CM/S
RIGHT ECA SYS: 64 CM/S
RIGHT ICA DIST DIAS: 13 CM/S
RIGHT ICA DIST SYS: 52 CM/S
RIGHT ICA MID DIAS: 21 CM/S
RIGHT ICA MID SYS: 78 CM/S
RIGHT ICA PROX DIAS: 14 CM/S
RIGHT ICA PROX SYS: 60 CM/S
RIGHT VERTEBRAL DIAS: 13 CM/S
RIGHT VERTEBRAL SYS: 59 CM/S

## 2022-07-19 PROCEDURE — 93880 EXTRACRANIAL BILAT STUDY: CPT | Mod: 26,,, | Performed by: INTERNAL MEDICINE

## 2022-07-19 PROCEDURE — 93880 EXTRACRANIAL BILAT STUDY: CPT | Mod: PO

## 2022-07-19 PROCEDURE — 93880 CV US DOPPLER CAROTID (CUPID ONLY): ICD-10-PCS | Mod: 26,,, | Performed by: INTERNAL MEDICINE

## 2022-09-16 ENCOUNTER — OFFICE VISIT (OUTPATIENT)
Dept: FAMILY MEDICINE | Facility: CLINIC | Age: 72
End: 2022-09-16
Payer: MEDICARE

## 2022-09-16 VITALS
BODY MASS INDEX: 25.1 KG/M2 | WEIGHT: 160.25 LBS | DIASTOLIC BLOOD PRESSURE: 60 MMHG | OXYGEN SATURATION: 99 % | HEART RATE: 92 BPM | SYSTOLIC BLOOD PRESSURE: 116 MMHG

## 2022-09-16 DIAGNOSIS — I10 ESSENTIAL HYPERTENSION: ICD-10-CM

## 2022-09-16 DIAGNOSIS — F41.8 DEPRESSION WITH ANXIETY: Primary | ICD-10-CM

## 2022-09-16 PROCEDURE — 99999 PR PBB SHADOW E&M-EST. PATIENT-LVL IV: CPT | Mod: PBBFAC,,, | Performed by: FAMILY MEDICINE

## 2022-09-16 PROCEDURE — 99999 PR PBB SHADOW E&M-EST. PATIENT-LVL IV: ICD-10-PCS | Mod: PBBFAC,,, | Performed by: FAMILY MEDICINE

## 2022-09-16 PROCEDURE — 99214 OFFICE O/P EST MOD 30 MIN: CPT | Mod: S$PBB,,, | Performed by: FAMILY MEDICINE

## 2022-09-16 PROCEDURE — 99214 PR OFFICE/OUTPT VISIT, EST, LEVL IV, 30-39 MIN: ICD-10-PCS | Mod: S$PBB,,, | Performed by: FAMILY MEDICINE

## 2022-09-16 PROCEDURE — 99214 OFFICE O/P EST MOD 30 MIN: CPT | Mod: PBBFAC,PO | Performed by: FAMILY MEDICINE

## 2022-09-16 NOTE — PROGRESS NOTES
Subjective:       Patient ID: Bev Miguel is a 72 y.o. female.    Chief Complaint: Dizziness (Review test results, poor circulation in her feet, fatigue, anxiety)    Pt is known to me.   The pt is having trouble with anxiety and depression.  She will sometimes start to feel dizzy.  She has no nausea nor vision change.  She denies suicidal ideation.  She agrees to see a psychologist.  We reviewed recent labs and tests per Dr. Mae.    Review of Systems   Constitutional:  Positive for fatigue. Negative for activity change and unexpected weight change.   HENT:  Negative for hearing loss, rhinorrhea and trouble swallowing.    Eyes:  Negative for discharge.   Respiratory:  Negative for chest tightness and wheezing.    Cardiovascular:  Negative for chest pain and palpitations.   Gastrointestinal:  Negative for blood in stool, constipation, diarrhea and vomiting.   Endocrine: Negative for polydipsia and polyuria.   Genitourinary:  Negative for difficulty urinating, dysuria, hematuria and menstrual problem.   Musculoskeletal:  Negative for arthralgias, joint swelling and neck pain.   Neurological:  Negative for weakness and headaches.   Psychiatric/Behavioral:  Positive for dysphoric mood. Negative for confusion. The patient is nervous/anxious.      Objective:      Physical Exam  Constitutional:       General: She is not in acute distress.     Appearance: Normal appearance. She is well-developed. She is not ill-appearing.   HENT:      Head: Normocephalic.      Mouth/Throat:      Mouth: Mucous membranes are moist.      Pharynx: Oropharynx is clear.      Comments: Tongue looks normal  Eyes:      Conjunctiva/sclera: Conjunctivae normal.      Pupils: Pupils are equal, round, and reactive to light.   Neck:      Thyroid: No thyromegaly.   Cardiovascular:      Rate and Rhythm: Normal rate and regular rhythm.      Heart sounds: Normal heart sounds.   Pulmonary:      Effort: Pulmonary effort is normal.      Breath sounds:  Normal breath sounds.   Abdominal:      General: Bowel sounds are normal.      Palpations: Abdomen is soft.      Tenderness: There is no abdominal tenderness.   Musculoskeletal:         General: No tenderness or deformity. Normal range of motion.      Cervical back: Normal range of motion and neck supple.   Lymphadenopathy:      Cervical: No cervical adenopathy.   Skin:     General: Skin is warm and dry.   Neurological:      General: No focal deficit present.      Mental Status: She is alert and oriented to person, place, and time.      Cranial Nerves: No cranial nerve deficit.      Motor: No abnormal muscle tone.      Coordination: Coordination normal.      Deep Tendon Reflexes: Reflexes normal.   Psychiatric:         Behavior: Behavior normal.      Comments: Midly anxious mood       Assessment:       1. Depression with anxiety    2. Essential hypertension        Plan:       Bev was seen today for dizziness.    Diagnoses and all orders for this visit:    Depression with anxiety  -     Ambulatory referral/consult to Psychology; Future    Essential hypertension    During this visit, I reviewed the pt's history, medications, allergies, and problem list.        Greater than 50% of visit spent counseling pt regarding her symptoms, diagnosis and management plans.

## 2022-09-20 ENCOUNTER — PATIENT MESSAGE (OUTPATIENT)
Dept: FAMILY MEDICINE | Facility: CLINIC | Age: 72
End: 2022-09-20
Payer: MEDICARE

## 2022-09-21 ENCOUNTER — TELEPHONE (OUTPATIENT)
Dept: PSYCHIATRY | Facility: CLINIC | Age: 72
End: 2022-09-21
Payer: MEDICARE

## 2022-09-21 ENCOUNTER — PATIENT MESSAGE (OUTPATIENT)
Dept: PSYCHIATRY | Facility: CLINIC | Age: 72
End: 2022-09-21
Payer: MEDICARE

## 2022-10-23 NOTE — PROGRESS NOTES
Subjective:    Patient ID:  Bev Miguel is a 72 y.o. female who presents for Valvular Heart Disease and Hypertension        HPI  CBC WITH ELEVATED MCV, POTASSIUM OK, GETTING , STRESS, NEGATIVE SE, MILD MR, MILD CAROTID PLAQUE BILATERALLY, SEE ROS    Past Medical History:   Diagnosis Date    Abnormal EKG     Allergy     perennial; sees Dr Jose ENT for injections    Asthma     mild interm    Breast CA     Cancer     ovarian    Chest pain     Chronic insomnia     Colonic polyp     Heart murmur     Hyperlipidemia     Hypoglycemia     Macrocytosis     Mild carotid artery disease 4/29/2019    Mitral valve regurgitation     Osteopenia     Ovarian cancer     SOB (shortness of breath)     Uterine cancer     Valvular regurgitation     Varicose vein of leg     Venous insufficiency     Vitamin C deficiency      Past Surgical History:   Procedure Laterality Date    BREAST SURGERY  05/28/2010    bilateral mastectomy/breast reconstruction; R breast ca.; no XRT/chemo. [facility] for 5 yrs.    hip replacement Right 2021    OOPHORECTOMY      sinuplasty      TC  02/20/2017    no polyps; GI/Dr March. 5 yr repeat.    TONSILLECTOMY      URETEROTOMY       Family History   Problem Relation Age of Onset    Early death Mother     Miscarriages / Stillbirths Mother     Cancer Father     Early death Father     Asthma Brother     Early death Brother      Social History     Socioeconomic History    Marital status:     Number of children: 2   Occupational History    Occupation: retired [employer]   Tobacco Use    Smoking status: Every Day     Packs/day: 0.25     Years: 45.00     Pack years: 11.25     Types: Cigarettes    Smokeless tobacco: Never   Substance and Sexual Activity    Alcohol use: Yes     Alcohol/week: 2.0 standard drinks     Types: 2 Shots of liquor per week     Comment: 2 cocktails daily; wine 5 glasses a week.    Drug use: No     Social Determinants of Health     Financial Resource Strain: Low Risk      Difficulty of Paying Living Expenses: Not very hard   Food Insecurity: No Food Insecurity    Worried About Running Out of Food in the Last Year: Never true    Ran Out of Food in the Last Year: Never true   Transportation Needs: No Transportation Needs    Lack of Transportation (Medical): No    Lack of Transportation (Non-Medical): No   Physical Activity: Inactive    Days of Exercise per Week: 0 days    Minutes of Exercise per Session: 0 min   Stress: Stress Concern Present    Feeling of Stress : To some extent   Social Connections: Moderately Isolated    Frequency of Communication with Friends and Family: More than three times a week    Frequency of Social Gatherings with Friends and Family: Once a week    Attends Jehov[employer] witness Services: Never    Active Member of Clubs or Organizations: No    Attends Club or Organization Meetings: Never    Marital Status:        Review of patient's allergies indicates:   Allergen Reactions    Benadryl [diphenhydramine hcl]     Biaxin [clarithromycin]     Iodine and iodide containing products     Levaquin [levofloxacin]     Sulfa (sulfonamide antibiotics)     Trazodone     Zyban [bupropion hcl (smoking deter)]     Bactrim [sulfamethoxazole-trimethoprim] Rash    Gadolinium-containing contrast media Rash       Current Outpatient Medications:     alendronate (FOSAMAX) 70 MG tablet, Take 1 tablet (70 mg total) by mouth every 7 days., Disp: 15 tablet, Rfl: 1    ascorbic acid, vitamin C, (VITAMIN C) 1000 MG tablet, Take 1,000 mg by mouth once daily., Disp: , Rfl:     aspirin (ECOTRIN) 81 MG EC tablet, Take 81 mg by mouth once daily., Disp: , Rfl:     calcium carbonate (OS-ALEK) 600 mg calcium (1,500 mg) Tab, Take 600 mg by mouth once., Disp: , Rfl:     cyanocobalamin (VITAMIN B-12) 1000 MCG tablet, Take 3,000 mcg by mouth once daily., Disp: , Rfl:     ibuprofen (ADVIL,MOTRIN) 200 MG tablet, Take 200 mg by mouth every 6 (six) hours as needed for Pain., Disp: , Rfl:     Lactobacillus  rhamnosus GG (CULTURELLE) 10 billion cell capsule, Take 1 capsule by mouth once daily., Disp: , Rfl:     MULTIVIT-MIN/IRON/FOLIC/LUTEIN (CENTRUM SILVER WOMEN ORAL), Take 1 capsule by mouth once daily., Disp: , Rfl:     tumeric-ging-olive-oreg-capryl 100 mg-150 mg- 50 mg-150 mg Cap, Take by mouth., Disp: , Rfl:     UNABLE TO FIND, medication name:collagen, Disp: , Rfl:     vitamin D 1000 units Tab, Take 2,000 mg by mouth 2 (two) times a day., Disp: , Rfl:     vitamin E 400 UNIT capsule, Take 1,000 Units by mouth once daily. , Disp: , Rfl:     amLODIPine (NORVASC) 5 MG tablet, Take 1 tablet (5 mg total) by mouth once daily., Disp: 90 tablet, Rfl: 1    midodrine (PROAMATINE) 2.5 MG Tab, Take 1 tablet (2.5 mg total) by mouth every 12 (twelve) hours as needed (LOW BP). (Patient not taking: No sig reported), Disp: 60 tablet, Rfl: 1    pravastatin (PRAVACHOL) 10 MG tablet, Take 1 tablet (10 mg total) by mouth once daily., Disp: 90 tablet, Rfl: 1    Review of Systems   Constitutional: Negative for chills, diaphoresis, fever, malaise/fatigue and night sweats.   HENT:  Negative for congestion and sore throat.    Eyes:  Negative for blurred vision and visual disturbance.   Cardiovascular:  Negative for chest pain, claudication, cyanosis, dyspnea on exertion, irregular heartbeat, leg swelling, near-syncope, orthopnea, palpitations, paroxysmal nocturnal dyspnea and syncope.   Respiratory:  Negative for cough, hemoptysis, shortness of breath and wheezing.    Endocrine: Negative for polyphagia and polyuria.   Hematologic/Lymphatic: Negative for adenopathy. Does not bruise/bleed easily.   Skin:  Negative for color change (TOES,FINGERS) and rash.   Musculoskeletal:  Negative for back pain, falls and muscle weakness.   Gastrointestinal:  Negative for abdominal pain, change in bowel habit, dysphagia, jaundice, melena and nausea.   Genitourinary:  Negative for dysuria and flank pain.   Neurological:  Negative for focal weakness,  "headaches, light-headedness, loss of balance, numbness and weakness.   Psychiatric/Behavioral:  Negative for altered mental status and depression. The patient is nervous/anxious.    Allergic/Immunologic: Negative for hives and persistent infections.      Objective:      Vitals:    10/24/22 1305   BP: 135/70   Pulse: 79   Weight: 71.1 kg (156 lb 12 oz)   Height: 5' 7" (1.702 m)   PainSc: 0-No pain     Body mass index is 24.55 kg/m².    Physical Exam  Constitutional:       Appearance: Normal appearance. She is well-developed.   HENT:      Head: Normocephalic and atraumatic.   Eyes:      General: No scleral icterus.     Extraocular Movements: Extraocular movements intact.      Right eye: Normal extraocular motion.   Neck:      Vascular: Normal carotid pulses. No JVD.   Cardiovascular:      Rate and Rhythm: Normal rate and regular rhythm.      Pulses:           Carotid pulses are 2+ on the right side and 2+ on the left side.       Radial pulses are 2+ on the right side and 2+ on the left side.        Posterior tibial pulses are 2+ on the right side and 2+ on the left side.      Heart sounds: Murmur heard.   Systolic murmur is present with a grade of 1/6 at the lower left sternal border.     No friction rub. No gallop. No S4 sounds.   Pulmonary:      Effort: Pulmonary effort is normal.      Breath sounds: Normal breath sounds. No rales.   Abdominal:      Palpations: Abdomen is soft. There is no hepatomegaly.      Tenderness: There is no abdominal tenderness.   Musculoskeletal:      Cervical back: Neck supple.      Right lower leg: No edema.      Left lower leg: No edema.   Skin:     General: Skin is warm and dry.      Comments:     Neurological:      General: No focal deficit present.      Mental Status: She is alert and oriented to person, place, and time.      Cranial Nerves: Cranial nerves 2-12 are intact.   Psychiatric:         Mood and Affect: Mood normal.         Speech: Speech normal.         Behavior: Behavior " normal.               ..    Chemistry        Component Value Date/Time     12/06/2021 0730     12/06/2021 0730    K 5.1 04/26/2022 1517     12/06/2021 0730     12/06/2021 0730    CO2 23 12/06/2021 0730    CO2 23 12/06/2021 0730    BUN 15 12/06/2021 0730    BUN 15 12/06/2021 0730    CREATININE 0.7 12/06/2021 0730    CREATININE 0.7 12/06/2021 0730    GLU 92 12/06/2021 0730    GLU 92 12/06/2021 0730        Component Value Date/Time    CALCIUM 9.4 12/06/2021 0730    CALCIUM 9.4 12/06/2021 0730    ALKPHOS 68 12/06/2021 0730    ALKPHOS 68 12/06/2021 0730    AST 19 12/06/2021 0730    AST 19 12/06/2021 0730    AST 27 03/14/2016 1523    ALT 15 12/06/2021 0730    ALT 15 12/06/2021 0730    BILITOT 0.8 12/06/2021 0730    BILITOT 0.8 12/06/2021 0730    ESTGFRAFRICA >60.0 12/06/2021 0730    ESTGFRAFRICA >60.0 12/06/2021 0730    EGFRNONAA >60.0 12/06/2021 0730    EGFRNONAA >60.0 12/06/2021 0730            ..  Lab Results   Component Value Date    CHOL 198 12/06/2021    CHOL 213 (H) 07/09/2019    CHOL 177 04/05/2018     Lab Results   Component Value Date    HDL 88 (H) 12/06/2021    HDL 97 (H) 07/09/2019     (H) 04/05/2018     Lab Results   Component Value Date    LDLCALC 98.0 12/06/2021    LDLCALC 106.0 07/09/2019    LDLCALC 61.2 (L) 04/05/2018     Lab Results   Component Value Date    TRIG 60 12/06/2021    TRIG 50 07/09/2019    TRIG 74 04/05/2018     Lab Results   Component Value Date    CHOLHDL 44.4 12/06/2021    CHOLHDL 45.5 07/09/2019    CHOLHDL 57.1 (H) 04/05/2018     ..  Lab Results   Component Value Date    WBC 8.37 07/01/2022    HGB 13.1 07/01/2022    HCT 40.7 07/01/2022     (H) 07/01/2022     07/01/2022       Test(s) Reviewed  I have reviewed the following in detail:  [] Stress test   [] Angiography   [] Echocardiogram   [x] Labs   [] Other:       Assessment:         ICD-10-CM ICD-9-CM   1. Non-rheumatic mitral regurgitation  I34.0 424.0   2. Thoracic aortic atherosclerosis   I70.0 440.0   3. Raynaud's disease without gangrene  I73.00 443.0   4. Essential hypertension  I10 401.9   5. Carotid artery plaque, bilateral  I65.23 433.10     433.30   6. Tobacco use  Z72.0 305.1   7. Anxiety  F41.9 300.00     Problem List Items Addressed This Visit          Psychiatric    Anxiety       Cardiac/Vascular    Raynaud's disease without gangrene    Non-rheumatic mitral regurgitation - Primary    Essential hypertension    Thoracic aortic atherosclerosis    Overview     cxr 2019         Carotid artery plaque, bilateral       Other    Tobacco use    Relevant Orders    X-Ray Chest PA And Lateral (Completed)        Plan:     OK FOR ANXIETY MEDS, LIKE LEXAPRO--, CHECK CXR, TOBACCO CESSATION COUNSELING FOLLOW-UP WITH PCP,ALL CV CLINICALLY STABLE, NO ANGINA, NO HF, NO TIA, NO CLINICAL ARRHYTHMIA,CONTINUE CURRENT MEDS, EDUCATION, DIET, EXERCISE , DECREASE ETOH, AVOID SUDDEN CHANGE IN TEMPERATURE EXPOSURE TO COLD, RETURN TO CLINIC IN,6 MO      Non-rheumatic mitral regurgitation    Thoracic aortic atherosclerosis  Comments:  NO EMBOLIZATION    Raynaud's disease without gangrene    Essential hypertension  Comments:  CONTROLLED    Carotid artery plaque, bilateral  Comments:  NO TIA    Tobacco use  Comments:  COUNSELING  Orders:  -     X-Ray Chest PA And Lateral; Future; Expected date: 10/24/2022    Anxiety    Other orders  -     amLODIPine (NORVASC) 5 MG tablet; Take 1 tablet (5 mg total) by mouth once daily.  Dispense: 90 tablet; Refill: 1  -     pravastatin (PRAVACHOL) 10 MG tablet; Take 1 tablet (10 mg total) by mouth once daily.  Dispense: 90 tablet; Refill: 1  RTC Low level/low impact aerobic exercise 5x's/wk. Heart healthy diet and risk factor modification.    See labs and med orders.    Aerobic exercise 5x's/wk. Heart healthy diet and risk factor modification.    See labs and med orders.

## 2022-10-24 ENCOUNTER — PATIENT MESSAGE (OUTPATIENT)
Dept: FAMILY MEDICINE | Facility: CLINIC | Age: 72
End: 2022-10-24
Payer: MEDICARE

## 2022-10-24 ENCOUNTER — HOSPITAL ENCOUNTER (OUTPATIENT)
Dept: RADIOLOGY | Facility: HOSPITAL | Age: 72
Discharge: HOME | End: 2022-10-24
Attending: INTERNAL MEDICINE
Payer: MEDICARE

## 2022-10-24 ENCOUNTER — OFFICE VISIT (OUTPATIENT)
Dept: CARDIOLOGY | Facility: CLINIC | Age: 72
End: 2022-10-24
Payer: MEDICARE

## 2022-10-24 VITALS
SYSTOLIC BLOOD PRESSURE: 135 MMHG | DIASTOLIC BLOOD PRESSURE: 70 MMHG | BODY MASS INDEX: 24.6 KG/M2 | WEIGHT: 156.75 LBS | HEIGHT: 67 IN | HEART RATE: 79 BPM

## 2022-10-24 DIAGNOSIS — I73.00 RAYNAUD'S DISEASE WITHOUT GANGRENE: Chronic | ICD-10-CM

## 2022-10-24 DIAGNOSIS — F41.9 ANXIETY: Chronic | ICD-10-CM

## 2022-10-24 DIAGNOSIS — I10 ESSENTIAL HYPERTENSION: Chronic | ICD-10-CM

## 2022-10-24 DIAGNOSIS — Z72.0 TOBACCO USE: ICD-10-CM

## 2022-10-24 DIAGNOSIS — I34.0 NON-RHEUMATIC MITRAL REGURGITATION: Primary | Chronic | ICD-10-CM

## 2022-10-24 DIAGNOSIS — Z72.0 TOBACCO USE: Chronic | ICD-10-CM

## 2022-10-24 DIAGNOSIS — I70.0 THORACIC AORTIC ATHEROSCLEROSIS: Chronic | ICD-10-CM

## 2022-10-24 DIAGNOSIS — I65.23 CAROTID ARTERY PLAQUE, BILATERAL: Chronic | ICD-10-CM

## 2022-10-24 PROCEDURE — 71046 X-RAY EXAM CHEST 2 VIEWS: CPT | Mod: 26,,, | Performed by: RADIOLOGY

## 2022-10-24 PROCEDURE — 99213 OFFICE O/P EST LOW 20 MIN: CPT | Mod: PBBFAC,25,PO | Performed by: INTERNAL MEDICINE

## 2022-10-24 PROCEDURE — 71046 XR CHEST PA AND LATERAL: ICD-10-PCS | Mod: 26,,, | Performed by: RADIOLOGY

## 2022-10-24 PROCEDURE — 71046 X-RAY EXAM CHEST 2 VIEWS: CPT | Mod: TC,FY,PO

## 2022-10-24 PROCEDURE — 99214 PR OFFICE/OUTPT VISIT, EST, LEVL IV, 30-39 MIN: ICD-10-PCS | Mod: S$PBB,,, | Performed by: INTERNAL MEDICINE

## 2022-10-24 PROCEDURE — 99999 PR PBB SHADOW E&M-EST. PATIENT-LVL III: ICD-10-PCS | Mod: PBBFAC,,, | Performed by: INTERNAL MEDICINE

## 2022-10-24 PROCEDURE — 99999 PR PBB SHADOW E&M-EST. PATIENT-LVL III: CPT | Mod: PBBFAC,,, | Performed by: INTERNAL MEDICINE

## 2022-10-24 PROCEDURE — 99214 OFFICE O/P EST MOD 30 MIN: CPT | Mod: S$PBB,,, | Performed by: INTERNAL MEDICINE

## 2022-10-24 RX ORDER — AMLODIPINE BESYLATE 5 MG/1
5 TABLET ORAL DAILY
Qty: 90 TABLET | Refills: 1 | Status: SHIPPED | OUTPATIENT
Start: 2022-10-24 | End: 2023-03-23

## 2022-10-24 RX ORDER — PRAVASTATIN SODIUM 10 MG/1
10 TABLET ORAL DAILY
Qty: 90 TABLET | Refills: 1 | Status: SHIPPED | OUTPATIENT
Start: 2022-10-24 | End: 2023-04-04 | Stop reason: SDUPTHER

## 2022-11-01 ENCOUNTER — PATIENT MESSAGE (OUTPATIENT)
Dept: PSYCHIATRY | Facility: CLINIC | Age: 72
End: 2022-11-01
Payer: MEDICARE

## 2022-11-01 ENCOUNTER — TELEPHONE (OUTPATIENT)
Dept: PSYCHIATRY | Facility: CLINIC | Age: 72
End: 2022-11-01
Payer: MEDICARE

## 2022-11-02 ENCOUNTER — OFFICE VISIT (OUTPATIENT)
Dept: FAMILY MEDICINE | Facility: CLINIC | Age: 72
End: 2022-11-02
Payer: MEDICARE

## 2022-11-02 VITALS
HEART RATE: 85 BPM | WEIGHT: 156.5 LBS | OXYGEN SATURATION: 95 % | DIASTOLIC BLOOD PRESSURE: 60 MMHG | SYSTOLIC BLOOD PRESSURE: 120 MMHG | BODY MASS INDEX: 24.52 KG/M2

## 2022-11-02 DIAGNOSIS — I10 ESSENTIAL HYPERTENSION: ICD-10-CM

## 2022-11-02 DIAGNOSIS — F41.9 ANXIETY: Primary | ICD-10-CM

## 2022-11-02 DIAGNOSIS — I70.0 THORACIC AORTIC ATHEROSCLEROSIS: ICD-10-CM

## 2022-11-02 DIAGNOSIS — Z23 NEED FOR VACCINATION: ICD-10-CM

## 2022-11-02 PROCEDURE — 99214 PR OFFICE/OUTPT VISIT, EST, LEVL IV, 30-39 MIN: ICD-10-PCS | Mod: S$PBB,,, | Performed by: FAMILY MEDICINE

## 2022-11-02 PROCEDURE — 91312 COVID-19, MRNA, LNP-S, BIVALENT BOOSTER, PF, 30 MCG/0.3 ML DOSE: CPT | Mod: PBBFAC,PO

## 2022-11-02 PROCEDURE — 99999 PR PBB SHADOW E&M-EST. PATIENT-LVL IV: CPT | Mod: PBBFAC,,, | Performed by: FAMILY MEDICINE

## 2022-11-02 PROCEDURE — 0124A COVID-19, MRNA, LNP-S, BIVALENT BOOSTER, PF, 30 MCG/0.3 ML DOSE: CPT | Mod: PBBFAC,PO

## 2022-11-02 PROCEDURE — 99214 OFFICE O/P EST MOD 30 MIN: CPT | Mod: S$PBB,,, | Performed by: FAMILY MEDICINE

## 2022-11-02 PROCEDURE — 99999 PR PBB SHADOW E&M-EST. PATIENT-LVL IV: ICD-10-PCS | Mod: PBBFAC,,, | Performed by: FAMILY MEDICINE

## 2022-11-02 PROCEDURE — 99214 OFFICE O/P EST MOD 30 MIN: CPT | Mod: PBBFAC,PO | Performed by: FAMILY MEDICINE

## 2022-11-02 RX ORDER — BUSPIRONE HYDROCHLORIDE 7.5 MG/1
7.5 TABLET ORAL EVERY 8 HOURS PRN
Qty: 90 TABLET | Refills: 3 | Status: SHIPPED | OUTPATIENT
Start: 2022-11-02 | End: 2023-07-31

## 2022-11-02 NOTE — PROGRESS NOTES
Subjective:       Patient ID: Bev Miguel is a 72 y.o. female.    Chief Complaint: Anxiety (Going through a divorce)    Pt is known to me.  The pt is doing well in general.  However she is having some episodic anxiety due to her upcoming divorce.  She denies depression symptoms.  She does not think she needs something to take every day.  She is sleeping pretty well.  The pt gets good BP readings at home.      Anxiety  Symptoms include nervous/anxious behavior. Patient reports no chest pain, confusion, palpitations or suicidal ideas.       Review of Systems   Constitutional:  Positive for fatigue. Negative for activity change and unexpected weight change.   HENT:  Negative for hearing loss, rhinorrhea and trouble swallowing.    Eyes:  Negative for discharge.   Respiratory:  Negative for chest tightness and wheezing.    Cardiovascular:  Negative for chest pain and palpitations.   Gastrointestinal:  Negative for blood in stool, constipation, diarrhea and vomiting.   Endocrine: Negative for polydipsia and polyuria.   Genitourinary:  Negative for difficulty urinating, dysuria, hematuria and menstrual problem.   Musculoskeletal:  Negative for arthralgias, joint swelling and neck pain.   Neurological:  Negative for weakness and headaches.   Psychiatric/Behavioral:  Negative for confusion, dysphoric mood and suicidal ideas. The patient is nervous/anxious.      Objective:      Vitals:    11/02/22 1032   BP: 120/60   Pulse: 85   SpO2: 95%   Weight: 71 kg (156 lb 8.4 oz)      Physical Exam  Constitutional:       General: She is not in acute distress.     Appearance: Normal appearance. She is well-developed. She is not ill-appearing.   HENT:      Head: Normocephalic.      Mouth/Throat:      Mouth: Mucous membranes are moist.      Pharynx: Oropharynx is clear.      Comments: Tongue looks normal  Eyes:      Conjunctiva/sclera: Conjunctivae normal.      Pupils: Pupils are equal, round, and reactive to light.   Neck:       Thyroid: No thyromegaly.   Cardiovascular:      Rate and Rhythm: Normal rate and regular rhythm.      Heart sounds: Normal heart sounds.   Pulmonary:      Effort: Pulmonary effort is normal.      Breath sounds: Normal breath sounds.   Abdominal:      General: Bowel sounds are normal.      Palpations: Abdomen is soft.      Tenderness: There is no abdominal tenderness.   Musculoskeletal:         General: No tenderness or deformity. Normal range of motion.      Cervical back: Normal range of motion and neck supple.   Lymphadenopathy:      Cervical: No cervical adenopathy.   Skin:     General: Skin is warm and dry.   Neurological:      General: No focal deficit present.      Mental Status: She is alert and oriented to person, place, and time.      Cranial Nerves: No cranial nerve deficit.      Motor: No abnormal muscle tone.      Coordination: Coordination normal.      Deep Tendon Reflexes: Reflexes normal.   Psychiatric:         Behavior: Behavior normal.      Comments: Midly anxious mood       Assessment:       1. Anxiety    2. Essential hypertension    3. Thoracic aortic atherosclerosis    4. Need for vaccination          Plan:       Bev was seen today for anxiety.    Diagnoses and all orders for this visit:    Anxiety  -     busPIRone (BUSPAR) 7.5 MG tablet; Take 1 tablet (7.5 mg total) by mouth every 8 (eight) hours as needed (anxiety).    Essential hypertension  -     CBC Auto Differential; Future  -     Comprehensive Metabolic Panel; Future    Thoracic aortic atherosclerosis  -     Lipid Panel; Future    Need for vaccination  -     COVID-19-MRNA-(PF)(Pfizer Omicron) Vaccine    During this visit, I reviewed the pt's history, medications, allergies, and problem list.

## 2022-11-21 ENCOUNTER — PATIENT MESSAGE (OUTPATIENT)
Dept: PSYCHIATRY | Facility: CLINIC | Age: 72
End: 2022-11-21
Payer: MEDICARE

## 2022-11-22 ENCOUNTER — OFFICE VISIT (OUTPATIENT)
Dept: PSYCHIATRY | Facility: CLINIC | Age: 72
End: 2022-11-22
Payer: MEDICARE

## 2022-11-22 DIAGNOSIS — F43.22 ADJUSTMENT DISORDER WITH ANXIETY: Primary | ICD-10-CM

## 2022-11-22 DIAGNOSIS — F41.8 DEPRESSION WITH ANXIETY: ICD-10-CM

## 2022-11-22 PROCEDURE — 90791 PSYCH DIAGNOSTIC EVALUATION: CPT | Mod: ,,,

## 2022-11-22 PROCEDURE — 90791 PR PSYCHIATRIC DIAGNOSTIC EVALUATION: ICD-10-PCS | Mod: ,,,

## 2022-11-22 NOTE — PROGRESS NOTES
Primary Care Behavioral Health: Initial  Patient Name: Bev Miguel  Date:  11/22/2022  Site:  [facility]  Referral source: JOSÉ MIGUEL Leo MD    Chief complaint/reason for encounter:  anxiety    History of present illness:  Ms. Bev Miguel is a 72 y.o. White female referred by JOSÉ MIGUEL Leo MD.  Patient was seen, examined and chart was reviewed. Patient reviewed and agreed to informed consent and limits of confidentiality. Patient reports that in 2014 she discovered that her  of over 20 years had been having an ongoing affair. The discovery of this affair caused a lot of anxiety and grief. She reports that in 2014 they participated in marriage counseling in attempt to reconcile their differences. Since then, she has felt as if she is walking on eggshells, actively avoiding being together or having meaningful conversations. Patient reports that in the last year, she has experienced shortness of breath and chest tightness which she believed might have been due to a heart problem. Tests and labs ruled out heart and lung problems. Patient was then diagnosed with anxiety by PCP and referred to Psychology to discuss ongoing anxiety and adopt healthy ways of coping. Patient reports in the last month and a half, she has filed for divorce and has noticed a significant improvement in her symptoms including decrease in anxiety, improvement with sleep, improved mood and self-esteem.     Past Medical History:   Diagnosis Date    Abnormal EKG     Allergy     perennial; sees Dr Jose ENT for injections    Asthma     mild interm    Breast CA     Cancer     ovarian    Chest pain     Chronic insomnia     Colonic polyp     Heart murmur     Hyperlipidemia     Hypoglycemia     Macrocytosis     Mild carotid artery disease 4/29/2019    Mitral valve regurgitation     Osteopenia     Ovarian cancer     SOB (shortness of breath)     Uterine cancer     Valvular regurgitation     Varicose vein of leg     Venous  insufficiency     Vitamin C deficiency          Current Outpatient Medications:     alendronate (FOSAMAX) 70 MG tablet, Take 1 tablet (70 mg total) by mouth every 7 days., Disp: 15 tablet, Rfl: 1    amLODIPine (NORVASC) 5 MG tablet, Take 1 tablet (5 mg total) by mouth once daily., Disp: 90 tablet, Rfl: 1    ascorbic acid, vitamin C, (VITAMIN C) 1000 MG tablet, Take 1,000 mg by mouth once daily., Disp: , Rfl:     aspirin (ECOTRIN) 81 MG EC tablet, Take 81 mg by mouth once daily., Disp: , Rfl:     busPIRone (BUSPAR) 7.5 MG tablet, Take 1 tablet (7.5 mg total) by mouth every 8 (eight) hours as needed (anxiety)., Disp: 90 tablet, Rfl: 3    calcium carbonate (OS-ALEK) 600 mg calcium (1,500 mg) Tab, Take 600 mg by mouth once., Disp: , Rfl:     cyanocobalamin (VITAMIN B-12) 1000 MCG tablet, Take 3,000 mcg by mouth once daily., Disp: , Rfl:     ibuprofen (ADVIL,MOTRIN) 200 MG tablet, Take 200 mg by mouth every 6 (six) hours as needed for Pain., Disp: , Rfl:     Lactobacillus rhamnosus GG (CULTURELLE) 10 billion cell capsule, Take 1 capsule by mouth once daily., Disp: , Rfl:     MULTIVIT-MIN/IRON/FOLIC/LUTEIN (CENTRUM SILVER WOMEN ORAL), Take 1 capsule by mouth once daily., Disp: , Rfl:     pravastatin (PRAVACHOL) 10 MG tablet, Take 1 tablet (10 mg total) by mouth once daily., Disp: 90 tablet, Rfl: 1    tumeric-ging-olive-oreg-capryl 100 mg-150 mg- 50 mg-150 mg Cap, Take by mouth., Disp: , Rfl:     vitamin D 1000 units Tab, Take 2,000 mg by mouth 2 (two) times a day., Disp: , Rfl:     vitamin E 400 UNIT capsule, Take 1,000 Units by mouth once daily. , Disp: , Rfl:     Psychiatric history:  Previous diagnosis: patient denies  Previous medications: patient denies  Previous hospitalizations: Patient denies  History of outpatient treatment: Patient reports marriage counseling [id number]  Previous suicide attempt:  Patient denies   Family history of psychiatric illness:  Patient denies    Current and past substance use:  Alcohol:   1 cocktail at night, a glass of wine couple nights a week.  Drugs:  patient denies   Tobacco:  5 cigarettes a day;   Caffeine:  2 cups of coffee in the morning    Psychiatric symptoms:  Depression:  some difficulty falling asleep  Isabela/Hypomania:  Denied.  She denied periods of elevated mood or abnormally increased energy or goal-directed activity.  Anxiety:  She reports feeling on edge, worried, and difficulty relaxing.  Thoughts:  Denied delusions, hallucinations.  Suicidal thoughts/behaviors: Patient denied suicidal and homicidal ideation, plan and intent.  Patient noted agreement to call 911/and or present to the ED if she experienced suicidal or homicidal ideation, plan or intent.    Self-injury:  Patient denies.  Sleep: improved since filing for divorce. Some difficulty with falling asleep a few days a week.  Trauma: reports it was difficult to process discovering that her  was having an affair    Mental Status Exam:  General appearance:  appears stated age, neatly dressed, well groomed  Speech:  Clear and intelligible, normal rate, normal tone, normal pitch, normal volume  Level of cooperation:  cooperative  Thought processes:  Linear, logical, goal-directed  Mood:  Generally euthymic some nervousness and restlessness noted at times  Thought content:  Relevant and appropriate, no illusions, no visual hallucinations, no auditory hallucinations, no delusions, no active or passive homicidal thoughts, no active or passive suicidal ideation, no obsessions, no compulsions, no violence  Affect:  appropriate  Orientation:  oriented to person, place, situation and date  Memory/Attention/Concentration:  No gross cognitive deficits made evident during conversation.  Judgment and insight: fair  Language:  intact      Little interest or pleasure in doing things: Not at all  Feeling down, depressed, or hopeless: Not at all  Trouble falling or staying asleep, or sleeping too much: Several days  Feeling tired or  having little energy: Not at all  Poor appetite or overeating: Not at all  Feeling bad about yourself - or that you are a failure or have let yourself or your family down: Not at all  Trouble concentrating on things, such as reading the newspaper or watching television: Not at all  Moving or speaking so slowly that other people could have noticed. Or the opposite - being so fidgety or restless that you have been moving around a lot more than usual: Not at all  Thoughts that you would be better off dead, or of hurting yourself in some way: Not at all  PHQ-9 Total Score: 1  If you checked off any problems, how difficult have these problems made it for you to do your work, take care of things at home, or get along with other people?: Not difficult at all  PHQ-9 Total Score: 1     If you checked off any problems, how difficult have these problems made it for you to do your work, take care of things at home, or get along with other people?: Not difficult at all    GAD7 11/22/2022 4/11/2018   1. Feeling nervous, anxious, or on edge? 2 0   2. Not being able to stop or control worrying? 2 0   3. Worrying too much about different things? 2 -   4. Trouble relaxing? 2 -   5. Being so restless that it is hard to sit still? 1 -   6. Becoming easily annoyed or irritable? 1 -   7. Feeling afraid as if something awful might happen? 0 -   8. If you checked off any problems, how difficult have these problems made it for you to do your work, take care of things at home, or get along with other people? 0 -   ARACELI-7 Score 10 -       Impression: Patient presents today on referral from her PCP after multiple concerns about her health due to SOB and chest tightness. After multiple test results came back negative, it was determined that patient was experiencing anxiety due to the status of her marriage. Upon meeting patient, she presented with a cheerful and upbeat demeanor. She expresses that since her last visit with her PCP she has filed  for divorce and is feeling a great sense of relief resulting in a decrease in anxiety and distress. She reports improved mood and sleep. She describes feeling sad at times, but does not report any interference with her daily functioning. Patient denies taking prescribed medication, reporting she is feeling better without them. She has been relying on her friends for social support. She continues to engage in pleasurable activities and maintains her usual schedule. Patient expresses that although she is doing well right now, things are subject to change depending on the course of the divorce. She is agreeable to meet 1x a month to further discuss coping skills.    Diagnosis:  1. Adjustment disorder with anxiety        2. Depression with anxiety  Ambulatory referral/consult to Psychology           Intervention and Plan:    Patient noted agreement to call 911/and or present to the ED if she experienced suicidal or homicidal ideation, plan or intent.   Provided supportive environment for patient to discuss her thoughts and feelings  Provided psychoeducation on anxiety  Discussed coping skills to help manage anxiety  Will follow-up in 4 weeks      Length of Session: 60mins face to face         Ebony Weiss Psy.D.  Clinical Health Psychology Fellow

## 2022-12-21 ENCOUNTER — PATIENT MESSAGE (OUTPATIENT)
Dept: PSYCHIATRY | Facility: CLINIC | Age: 72
End: 2022-12-21
Payer: MEDICARE

## 2022-12-22 ENCOUNTER — OFFICE VISIT (OUTPATIENT)
Dept: PSYCHIATRY | Facility: CLINIC | Age: 72
End: 2022-12-22
Payer: MEDICARE

## 2022-12-22 DIAGNOSIS — F43.22 ADJUSTMENT DISORDER WITH ANXIETY: Primary | ICD-10-CM

## 2022-12-22 PROCEDURE — 90832 PSYTX W PT 30 MINUTES: CPT | Mod: ,,,

## 2022-12-22 PROCEDURE — 90832 PR PSYCHOTHERAPY W/PATIENT, 30 MIN: ICD-10-PCS | Mod: ,,,

## 2022-12-22 NOTE — PROGRESS NOTES
Primary Care Behavioral Health: Follow-up  Patient Name: Bev Miguel  Date:  12/22/2022  Site:  [facility]  Referral source: JOSÉ MIGUEL Leo MD    Chief complaint/reason for encounter: anxiety      History of present illness:  Ms. Bev Miguel is a 72 y.o. White female referred by JOSÉ MIGUEL Leo MD.  Patient was seen, examined and chart was reviewed. Patient reviewed and agreed to informed consent and limits of confidentiality. Patient reports that in 2014 she discovered that her  of over 20 years had been having an ongoing affair. The discovery of this affair caused a lot of anxiety and grief. She reports that in 2014 they participated in marriage counseling in attempt to reconcile their differences. Since then, she has felt as if she is walking on eggshells, actively avoiding being together or having meaningful conversations. Patient reports that in the last year, she has experienced shortness of breath and chest tightness which she believed might have been due to a heart problem. Tests and labs ruled out heart and lung problems. Patient was then diagnosed with anxiety by PCP and referred to Psychology to discuss ongoing anxiety and adopt healthy ways of coping. Patient reports since she has filed for divorce and has noticed a significant improvement in her symptoms including decrease in anxiety, improvement with sleep, improved mood and self-esteem.     Past Medical History:   Diagnosis Date    Abnormal EKG     Allergy     perennial; sees Dr Jose ENT for injections    Asthma     mild interm    Breast CA     Cancer     ovarian    Chest pain     Chronic insomnia     Colonic polyp     Heart murmur     Hyperlipidemia     Hypoglycemia     Macrocytosis     Mild carotid artery disease 4/29/2019    Mitral valve regurgitation     Osteopenia     Ovarian cancer     SOB (shortness of breath)     Uterine cancer     Valvular regurgitation     Varicose vein of leg     Venous insufficiency      Vitamin C deficiency          Current Outpatient Medications:     alendronate (FOSAMAX) 70 MG tablet, TAKE 1 TABLET EVERY 7 DAYS, Disp: 12 tablet, Rfl: 1    amLODIPine (NORVASC) 5 MG tablet, Take 1 tablet (5 mg total) by mouth once daily., Disp: 90 tablet, Rfl: 1    ascorbic acid, vitamin C, (VITAMIN C) 1000 MG tablet, Take 1,000 mg by mouth once daily., Disp: , Rfl:     aspirin (ECOTRIN) 81 MG EC tablet, Take 81 mg by mouth once daily., Disp: , Rfl:     busPIRone (BUSPAR) 7.5 MG tablet, Take 1 tablet (7.5 mg total) by mouth every 8 (eight) hours as needed (anxiety)., Disp: 90 tablet, Rfl: 3    calcium carbonate (OS-ALEK) 600 mg calcium (1,500 mg) Tab, Take 600 mg by mouth once., Disp: , Rfl:     cyanocobalamin (VITAMIN B-12) 1000 MCG tablet, Take 3,000 mcg by mouth once daily., Disp: , Rfl:     ibuprofen (ADVIL,MOTRIN) 200 MG tablet, Take 200 mg by mouth every 6 (six) hours as needed for Pain., Disp: , Rfl:     Lactobacillus rhamnosus GG (CULTURELLE) 10 billion cell capsule, Take 1 capsule by mouth once daily., Disp: , Rfl:     MULTIVIT-MIN/IRON/FOLIC/LUTEIN (CENTRUM SILVER WOMEN ORAL), Take 1 capsule by mouth once daily., Disp: , Rfl:     pravastatin (PRAVACHOL) 10 MG tablet, Take 1 tablet (10 mg total) by mouth once daily., Disp: 90 tablet, Rfl: 1    tumeric-ging-olive-oreg-capryl 100 mg-150 mg- 50 mg-150 mg Cap, Take by mouth., Disp: , Rfl:     vitamin D 1000 units Tab, Take 2,000 mg by mouth 2 (two) times a day., Disp: , Rfl:     vitamin E 400 UNIT capsule, Take 1,000 Units by mouth once daily. , Disp: , Rfl:     Psychiatric history:  Previous diagnosis: patient denies  Previous medications: patient denies  Previous hospitalizations: Patient denies  History of outpatient treatment: Patient reports marriage counseling [id number]  Previous suicide attempt:  Patient denies   Family history of psychiatric illness:  Patient denies    Current and past substance use:  Alcohol:  1 cocktail at night, a glass of wine  couple nights a week.  Drugs:  patient denies   Tobacco:  5 cigarettes a day;   Caffeine:  2 cups of coffee in the morning    Psychiatric symptoms:  Depression:  some difficulty falling asleep, having little interest in doing things (outside of home)  Isabela/Hypomania:  Denied.  She denied periods of elevated mood or abnormally increased energy or goal-directed activity.  Anxiety:  She reports feeling on edge, worried, and difficulty relaxing. Discussed in the context of impending divorce and settlement of assets.  Thoughts:  Denied delusions, hallucinations.  Suicidal thoughts/behaviors: Patient denied suicidal and homicidal ideation, plan and intent.  Patient noted agreement to call 911/and or present to the ED if she experienced suicidal or homicidal ideation, plan or intent.    Self-injury:  Patient denies.  Sleep: improved since filing for divorce. Reports 1x per week difficulty with falling asleep   Trauma: reports it was difficult to process discovering that her  was having an affair; denies flashbacks, nightmares.    Mental Status Exam:  General appearance:  appears stated age, neatly dressed, well groomed  Speech:  Clear and intelligible, normal rate, normal tone, normal pitch, normal volume  Level of cooperation:  cooperative  Thought processes:  Linear, logical, goal-directed  Mood:  Generally euthymic some nervousness and restlessness noted at times  Thought content:  Relevant and appropriate, no illusions, no visual hallucinations, no auditory hallucinations, no delusions, no active or passive homicidal thoughts, no active or passive suicidal ideation, no obsessions, no compulsions, no violence  Affect:  appropriate  Orientation:  oriented to person, place, situation and date  Memory/Attention/Concentration:  No gross cognitive deficits made evident during conversation.  Judgment and insight: fair  Language:  intact        Depression Patient Health Questionnaire (PHQ-9)  Over the last two weeks  how often have you been bothered by little interest or pleasure in doing things: Several days  Over the last two weeks how often have you been bothered by feeling down, depressed or hopeless: Not at all  Over the last two weeks how often have you been bothered by trouble falling or staying asleep, or sleeping too much: More than half the days  Over the last two weeks how often have you been bothered by feeling tired or having little energy: Several days  Over the last two weeks how often have you been bothered by a poor appetite or overeating: Not at all  Over the last two weeks how often have you been bothered by feeling bad about yourself - or that you are a failure or have let yourself or your family down: Not at all  Over the last two weeks how often have you been bothered by trouble concentrating on things, such as reading the newspaper or watching television: Not at all  Over the last two weeks how often have you been bothered by moving or speaking so slowly that other people could have noticed. Or the opposite - being so fidgety or restless that you have been moving around a lot more than usual.: Not at all  Over the last two weeks how often have you been bothered by thoughts that you would be better off dead, or of hurting yourself: Not at all  If you checked off any problems, how difficult have these problems made it for you to do your work, take care of things at home or get along with other people?: Not difficult at all  PHQ-9 Score: 4  PHQ-9 Interpretation: Minimal or None         GAD7 12/22/2022 11/22/2022 4/11/2018   1. Feeling nervous, anxious, or on edge? 3 2 0   2. Not being able to stop or control worrying? 1 2 0   3. Worrying too much about different things? 1 2 -   4. Trouble relaxing? 0 2 -   5. Being so restless that it is hard to sit still? 0 1 -   6. Becoming easily annoyed or irritable? 0 1 -   7. Feeling afraid as if something awful might happen? 0 0 -   8. If you checked off any problems,  how difficult have these problems made it for you to do your work, take care of things at home, or get along with other people? 0 0 -   ARACELI-7 Score 5 10 -       Impression: Patient is cheerful and pleasant upon arrival. She shares her anxiety continues to decrease and is manageable. Patient is now taking prescribed medication as prescribed and notes that the people in her life have noticed a difference in her mood and anxiety. Some anxiety and worry remains with the pending divorce and settling of assests however denies feeling completely overwhelmed She continues to have a strong social support. She continues to engage in pleasurable activities and maintains her usual schedule. She notes that weekend can sometimes be difficult because she is mostly alone. Discussed activities she could do alone. Patient was agreeable to start engaging in solo activities. Overall, patient has been successful in using the interventions to help manage her anxiety.     Diagnosis:  1. Adjustment disorder with anxiety             Interventions and Plan:    Patient noted agreement to call 911/and or present to the ED if they experienced suicidal or homicidal ideation, plan or intent.   Discussed the benefit of self-care   At this time, no follow up in necessary. Patient was advised to alert medical team if mood or anxiety worsen.   Patient aware of resources for future needs.    Length of Session:35mins         Ebony Weiss Psy.D  Clinical Health Psychology Fellow

## 2023-01-09 ENCOUNTER — OFFICE VISIT (OUTPATIENT)
Dept: OBSTETRICS AND GYNECOLOGY | Facility: CLINIC | Age: 73
End: 2023-01-09
Payer: MEDICARE

## 2023-01-09 VITALS
HEIGHT: 67 IN | BODY MASS INDEX: 24.63 KG/M2 | WEIGHT: 156.94 LBS | DIASTOLIC BLOOD PRESSURE: 80 MMHG | RESPIRATION RATE: 16 BRPM | SYSTOLIC BLOOD PRESSURE: 136 MMHG

## 2023-01-09 DIAGNOSIS — Z01.419 WELL WOMAN EXAM: Primary | ICD-10-CM

## 2023-01-09 DIAGNOSIS — R39.9 UTI SYMPTOMS: ICD-10-CM

## 2023-01-09 DIAGNOSIS — M81.0 AGE-RELATED OSTEOPOROSIS WITHOUT CURRENT PATHOLOGICAL FRACTURE: ICD-10-CM

## 2023-01-09 PROCEDURE — 87086 URINE CULTURE/COLONY COUNT: CPT | Performed by: STUDENT IN AN ORGANIZED HEALTH CARE EDUCATION/TRAINING PROGRAM

## 2023-01-09 PROCEDURE — 99999 PR PBB SHADOW E&M-EST. PATIENT-LVL III: ICD-10-PCS | Mod: PBBFAC,,, | Performed by: STUDENT IN AN ORGANIZED HEALTH CARE EDUCATION/TRAINING PROGRAM

## 2023-01-09 PROCEDURE — G0101 PR CA SCREEN;PELVIC/BREAST EXAM: ICD-10-PCS | Mod: S$PBB,,, | Performed by: STUDENT IN AN ORGANIZED HEALTH CARE EDUCATION/TRAINING PROGRAM

## 2023-01-09 PROCEDURE — 99999 PR PBB SHADOW E&M-EST. PATIENT-LVL III: CPT | Mod: PBBFAC,,, | Performed by: STUDENT IN AN ORGANIZED HEALTH CARE EDUCATION/TRAINING PROGRAM

## 2023-01-09 PROCEDURE — G0101 CA SCREEN;PELVIC/BREAST EXAM: HCPCS | Mod: S$PBB,,, | Performed by: STUDENT IN AN ORGANIZED HEALTH CARE EDUCATION/TRAINING PROGRAM

## 2023-01-09 PROCEDURE — 99213 OFFICE O/P EST LOW 20 MIN: CPT | Mod: PBBFAC,PN | Performed by: STUDENT IN AN ORGANIZED HEALTH CARE EDUCATION/TRAINING PROGRAM

## 2023-01-09 NOTE — PROGRESS NOTES
History & Physical  Gynecology      SUBJECTIVE:     Chief Complaint: Bone Density, Well Woman, and Poss UTI for 1 month       History of Present Illness:    72 y.o. here today for well woman exam. Getting  from  of 31 years, been stressful.     Gyn: no PMB. Hx of hyst/USO in 20's for endometrial/ovarian cancer? Left one ovary behind? No hx of abnormal pap smear prior to hyst. Breast cancer survivor, double mastectomy. Neg BRCA per patient.     No tobacco, etoh or other drugs        Review of patient's allergies indicates:   Allergen Reactions    Benadryl [diphenhydramine hcl]     Biaxin [clarithromycin]     Iodine and iodide containing products     Levaquin [levofloxacin]     Sulfa (sulfonamide antibiotics)     Trazodone     Zyban [bupropion hcl (smoking deter)]     Bactrim [sulfamethoxazole-trimethoprim] Rash    Gadolinium-containing contrast media Rash       Past Medical History:   Diagnosis Date    Abnormal EKG     Allergy     perennial; sees Dr Jose ENT for injections    Asthma     mild interm    Breast CA     Cancer     ovarian    Chest pain     Chronic insomnia     Colonic polyp     Heart murmur     Hyperlipidemia     Hypoglycemia     Macrocytosis     Mild carotid artery disease 2019    Mitral valve regurgitation     Osteopenia     Ovarian cancer     SOB (shortness of breath)     Uterine cancer     Valvular regurgitation     Varicose vein of leg     Venous insufficiency     Vitamin C deficiency      Past Surgical History:   Procedure Laterality Date    BREAST SURGERY  2010    bilateral mastectomy/breast reconstruction; R breast ca.; no XRT/chemo. Femara for 5 yrs.    hip replacement Right     OOPHORECTOMY      sinuplasty      TC  2017    no polyps; GI/Dr March. 5 yr repeat.    TONSILLECTOMY      URETEROTOMY       OB History          1    Para   1    Term   1            AB        Living             SAB        IAB        Ectopic        Multiple        Live  Births                   Family History   Problem Relation Age of Onset    Early death Mother     Miscarriages / Stillbirths Mother     Cancer Father     Early death Father     Asthma Brother     Early death Brother      Social History     Tobacco Use    Smoking status: Every Day     Packs/day: 0.25     Years: 45.00     Pack years: 11.25     Types: Cigarettes    Smokeless tobacco: Never   Substance Use Topics    Alcohol use: Yes     Alcohol/week: 2.0 standard drinks     Types: 2 Shots of liquor per week     Comment: 2 cocktails daily; wine 5 glasses a week.    Drug use: No       Current Outpatient Medications   Medication Sig    alendronate (FOSAMAX) 70 MG tablet TAKE 1 TABLET EVERY 7 DAYS    amLODIPine (NORVASC) 5 MG tablet Take 1 tablet (5 mg total) by mouth once daily.    ascorbic acid, vitamin C, (VITAMIN C) 1000 MG tablet Take 1,000 mg by mouth once daily.    aspirin (ECOTRIN) 81 MG EC tablet Take 81 mg by mouth once daily.    busPIRone (BUSPAR) 7.5 MG tablet Take 1 tablet (7.5 mg total) by mouth every 8 (eight) hours as needed (anxiety).    calcium carbonate (OS-ALEK) 600 mg calcium (1,500 mg) Tab Take 600 mg by mouth once.    cyanocobalamin (VITAMIN B-12) 1000 MCG tablet Take 3,000 mcg by mouth once daily.    ibuprofen (ADVIL,MOTRIN) 200 MG tablet Take 200 mg by mouth every 6 (six) hours as needed for Pain.    Lactobacillus rhamnosus GG (CULTURELLE) 10 billion cell capsule Take 1 capsule by mouth once daily.    MULTIVIT-MIN/IRON/FOLIC/LUTEIN (CENTRUM SILVER WOMEN ORAL) Take 1 capsule by mouth once daily.    pravastatin (PRAVACHOL) 10 MG tablet Take 1 tablet (10 mg total) by mouth once daily.    tumeric-ging-olive-oreg-capryl 100 mg-150 mg- 50 mg-150 mg Cap Take by mouth.    vitamin D 1000 units Tab Take 2,000 mg by mouth 2 (two) times a day.    vitamin E 400 UNIT capsule Take 1,000 Units by mouth once daily.      No current facility-administered medications for this visit.         Review of Systems:  Review of  Systems   Constitutional:  Negative for chills, fatigue and fever.   HENT:  Negative for congestion.    Eyes:  Negative for visual disturbance.   Respiratory:  Negative for cough and shortness of breath.    Cardiovascular:  Negative for chest pain and palpitations.   Gastrointestinal:  Negative for abdominal distention, abdominal pain, constipation, diarrhea, nausea and vomiting.   Genitourinary:  Negative for difficulty urinating, dysuria, hematuria, vaginal bleeding and vaginal discharge.   Skin:  Negative for rash.   Neurological:  Negative for dizziness, seizures, light-headedness and headaches.   Hematological:  Does not bruise/bleed easily.   Psychiatric/Behavioral:  Negative for dysphoric mood. The patient is not nervous/anxious.       OBJECTIVE:     Physical Exam:  Physical Exam  Vitals reviewed.   Constitutional:       General: She is not in acute distress.     Appearance: Normal appearance. She is well-developed.   HENT:      Head: Normocephalic and atraumatic.   Cardiovascular:      Rate and Rhythm: Normal rate.   Pulmonary:      Effort: Pulmonary effort is normal.   Abdominal:      General: There is no distension.      Palpations: Abdomen is soft.   Genitourinary:     Vagina: Normal.      Comments: Normal external female genitalia, normal hair distribution. Vaginal mucosa pink, moist, well rugated, scant white physiologic discharge. No blood in vault. No cervix. Vaginal cuff intact. Adnexa without fullness or tenderness.    Skin:     General: Skin is warm.   Neurological:      Mental Status: She is alert and oriented to person, place, and time.   Psychiatric:         Behavior: Behavior normal.         Thought Content: Thought content normal.         Judgment: Judgment normal.         ASSESSMENT:       ICD-10-CM ICD-9-CM    1. Well woman exam  Z01.419 V72.31 DXA Bone Density Spine And Hip      2. Age-related osteoporosis without current pathological fracture  M81.0 733.01 DXA Bone Density Spine And Hip       3. UTI symptoms  R39.9 788.99 Urine culture          Orders Placed This Encounter   Procedures    Urine culture          DXA Bone Density Spine And Hip     Standing Status:   Future     Standing Expiration Date:   1/9/2026     Order Specific Question:   May the Radiologist modify the order per protocol to meet the clinical needs of the patient?     Answer:   Yes     Order Specific Question:   Release to patient     Answer:   Immediate           Plan:      Well woman:  - Pap n/a unless had cervical cancer  - MMG n/a, s/p mastectomy   - colonoscopy has done with outside provider   - tobacco cessation n/an  - contraception /an  - DEXA 11/2023, on fosfamax  - Counseled to take daily multivitamin. If patient is of reproductive age and not on contraception, to take prenatal vitamin. Patient has been counseled on the vitamin D and calcium requirements per ACOG recommendations.  Age   Calcium(mg/day)   Vitamin D (IU/day)  9-18    1300                       600  19-50  1000                       600  51-70  1200                       600  >70      1,200                      800  Patient to continue supplements  - intrarosa vaginal suppositories for vaginal atrophy   Lizette Gambino M.D.  Obstetrics and Gynecology

## 2023-01-10 NOTE — ADDENDUM NOTE
Addended by: LAURI JORDAN on: 2/15/2018 02:50 PM     Modules accepted: Orders     Patient c/o runny nose, cough, congestion, headache and sore throat which started yesterday. He states he's had pain on the right side of his chest that hurts worse with movement. The patient's mom states he hasn't had a fever at home, but grandma gave him ibuprofen earlier today.

## 2023-01-11 LAB — BACTERIA UR CULT: NO GROWTH

## 2023-02-22 ENCOUNTER — LAB VISIT (OUTPATIENT)
Dept: LAB | Facility: HOSPITAL | Age: 73
End: 2023-02-22
Attending: FAMILY MEDICINE
Payer: MEDICARE

## 2023-02-22 DIAGNOSIS — I70.0 THORACIC AORTIC ATHEROSCLEROSIS: ICD-10-CM

## 2023-02-22 DIAGNOSIS — I10 ESSENTIAL HYPERTENSION: ICD-10-CM

## 2023-02-22 LAB
ALBUMIN SERPL BCP-MCNC: 4 G/DL (ref 3.5–5.2)
ALP SERPL-CCNC: 70 U/L (ref 55–135)
ALT SERPL W/O P-5'-P-CCNC: 18 U/L (ref 10–44)
ANION GAP SERPL CALC-SCNC: 9 MMOL/L (ref 8–16)
AST SERPL-CCNC: 21 U/L (ref 10–40)
BASOPHILS # BLD AUTO: 0.03 K/UL (ref 0–0.2)
BASOPHILS NFR BLD: 0.4 % (ref 0–1.9)
BILIRUB SERPL-MCNC: 0.8 MG/DL (ref 0.1–1)
BUN SERPL-MCNC: 12 MG/DL (ref 8–23)
CALCIUM SERPL-MCNC: 9.4 MG/DL (ref 8.7–10.5)
CHLORIDE SERPL-SCNC: 103 MMOL/L (ref 95–110)
CHOLEST SERPL-MCNC: 191 MG/DL (ref 120–199)
CHOLEST/HDLC SERPL: 2.1 {RATIO} (ref 2–5)
CO2 SERPL-SCNC: 25 MMOL/L (ref 23–29)
CREAT SERPL-MCNC: 0.8 MG/DL (ref 0.5–1.4)
DIFFERENTIAL METHOD: ABNORMAL
EOSINOPHIL # BLD AUTO: 0.2 K/UL (ref 0–0.5)
EOSINOPHIL NFR BLD: 2.1 % (ref 0–8)
ERYTHROCYTE [DISTWIDTH] IN BLOOD BY AUTOMATED COUNT: 13.4 % (ref 11.5–14.5)
EST. GFR  (NO RACE VARIABLE): >60 ML/MIN/1.73 M^2
GLUCOSE SERPL-MCNC: 94 MG/DL (ref 70–110)
HCT VFR BLD AUTO: 42.2 % (ref 37–48.5)
HDLC SERPL-MCNC: 91 MG/DL (ref 40–75)
HDLC SERPL: 47.6 % (ref 20–50)
HGB BLD-MCNC: 13.8 G/DL (ref 12–16)
IMM GRANULOCYTES # BLD AUTO: 0.08 K/UL (ref 0–0.04)
IMM GRANULOCYTES NFR BLD AUTO: 1.1 % (ref 0–0.5)
LDLC SERPL CALC-MCNC: 83.6 MG/DL (ref 63–159)
LYMPHOCYTES # BLD AUTO: 2.6 K/UL (ref 1–4.8)
LYMPHOCYTES NFR BLD: 34.7 % (ref 18–48)
MCH RBC QN AUTO: 33.4 PG (ref 27–31)
MCHC RBC AUTO-ENTMCNC: 32.7 G/DL (ref 32–36)
MCV RBC AUTO: 102 FL (ref 82–98)
MONOCYTES # BLD AUTO: 0.4 K/UL (ref 0.3–1)
MONOCYTES NFR BLD: 4.8 % (ref 4–15)
NEUTROPHILS # BLD AUTO: 4.3 K/UL (ref 1.8–7.7)
NEUTROPHILS NFR BLD: 56.9 % (ref 38–73)
NONHDLC SERPL-MCNC: 100 MG/DL
NRBC BLD-RTO: 0 /100 WBC
PLATELET # BLD AUTO: 288 K/UL (ref 150–450)
PMV BLD AUTO: 10.6 FL (ref 9.2–12.9)
POTASSIUM SERPL-SCNC: 4.8 MMOL/L (ref 3.5–5.1)
PROT SERPL-MCNC: 7 G/DL (ref 6–8.4)
RBC # BLD AUTO: 4.13 M/UL (ref 4–5.4)
SODIUM SERPL-SCNC: 137 MMOL/L (ref 136–145)
TRIGL SERPL-MCNC: 82 MG/DL (ref 30–150)
WBC # BLD AUTO: 7.55 K/UL (ref 3.9–12.7)

## 2023-02-22 PROCEDURE — 80061 LIPID PANEL: CPT | Performed by: FAMILY MEDICINE

## 2023-02-22 PROCEDURE — 85025 COMPLETE CBC W/AUTO DIFF WBC: CPT | Performed by: FAMILY MEDICINE

## 2023-02-22 PROCEDURE — 36415 COLL VENOUS BLD VENIPUNCTURE: CPT | Mod: PO | Performed by: FAMILY MEDICINE

## 2023-02-22 PROCEDURE — 80053 COMPREHEN METABOLIC PANEL: CPT | Performed by: FAMILY MEDICINE

## 2023-02-28 ENCOUNTER — OFFICE VISIT (OUTPATIENT)
Dept: FAMILY MEDICINE | Facility: CLINIC | Age: 73
End: 2023-02-28
Payer: MEDICARE

## 2023-02-28 VITALS
HEART RATE: 74 BPM | SYSTOLIC BLOOD PRESSURE: 128 MMHG | WEIGHT: 157 LBS | BODY MASS INDEX: 24.64 KG/M2 | HEIGHT: 67 IN | OXYGEN SATURATION: 99 % | DIASTOLIC BLOOD PRESSURE: 64 MMHG

## 2023-02-28 DIAGNOSIS — F41.9 ANXIETY: ICD-10-CM

## 2023-02-28 DIAGNOSIS — I10 ESSENTIAL HYPERTENSION: Primary | ICD-10-CM

## 2023-02-28 DIAGNOSIS — F51.01 PRIMARY INSOMNIA: ICD-10-CM

## 2023-02-28 DIAGNOSIS — I77.9 MILD CAROTID ARTERY DISEASE: ICD-10-CM

## 2023-02-28 DIAGNOSIS — F17.210 SMOKING GREATER THAN 25 PACK YEARS: ICD-10-CM

## 2023-02-28 PROCEDURE — 99214 PR OFFICE/OUTPT VISIT, EST, LEVL IV, 30-39 MIN: ICD-10-PCS | Mod: S$PBB,,, | Performed by: FAMILY MEDICINE

## 2023-02-28 PROCEDURE — 99214 OFFICE O/P EST MOD 30 MIN: CPT | Mod: S$PBB,,, | Performed by: FAMILY MEDICINE

## 2023-02-28 PROCEDURE — 99214 OFFICE O/P EST MOD 30 MIN: CPT | Mod: PBBFAC,PO | Performed by: FAMILY MEDICINE

## 2023-02-28 PROCEDURE — 99999 PR PBB SHADOW E&M-EST. PATIENT-LVL IV: ICD-10-PCS | Mod: PBBFAC,,, | Performed by: FAMILY MEDICINE

## 2023-02-28 PROCEDURE — 99999 PR PBB SHADOW E&M-EST. PATIENT-LVL IV: CPT | Mod: PBBFAC,,, | Performed by: FAMILY MEDICINE

## 2023-02-28 RX ORDER — ESZOPICLONE 2 MG/1
2 TABLET, FILM COATED ORAL NIGHTLY
Qty: 90 TABLET | Refills: 1 | Status: SHIPPED | OUTPATIENT
Start: 2023-02-28 | End: 2023-03-30

## 2023-02-28 NOTE — PROGRESS NOTES
Subjective:       Patient ID: Bev Miguel is a 72 y.o. female.    Chief Complaint: Follow-up    Pt is known to me.  Pt is here for followup of chronic medical issues.   The pt going through a divorce and is often anxious.  She is managing it well with the Buspar as needed.  She is not sleeping well.  She does not want Ambien that she took in the past.  She asks about Lunesta.  The pt sees a [employer] counselor next week for the first time.  Discussed health maintenance with pt and will schedule appropriate studies and/or immunizations.   We discussed her recent excellent labs.  The pt is followed by Dr. Reynolds for her valve disorders, HLD, and carotid artery disease.  The pt has smoked a half pack of cigarettes a day for the last 50+ years.  She agrees to LDCT scan for lung cancer screening.    Follow-up  Pertinent negatives include no abdominal pain, arthralgias, chest pain, coughing, headaches, nausea, numbness, rash or vomiting.   Review of Systems   Constitutional:  Negative for activity change, appetite change and unexpected weight change.   HENT:  Negative for trouble swallowing.    Eyes:  Negative for visual disturbance.   Respiratory:  Negative for cough, chest tightness and shortness of breath.    Cardiovascular:  Negative for chest pain, palpitations and leg swelling.   Gastrointestinal:  Negative for abdominal pain, constipation, diarrhea, nausea and vomiting.   Endocrine: Negative for cold intolerance, heat intolerance and polyuria.   Genitourinary:  Negative for decreased urine volume and dysuria.   Musculoskeletal:  Negative for arthralgias and back pain.   Skin:  Negative for rash.   Neurological:  Negative for numbness and headaches.   Psychiatric/Behavioral:  Positive for sleep disturbance. Negative for dysphoric mood. The patient is nervous/anxious.      Objective:      Physical Exam  Constitutional:       Appearance: She is well-developed.   HENT:      Head: Normocephalic.   Eyes:       Conjunctiva/sclera: Conjunctivae normal.      Pupils: Pupils are equal, round, and reactive to light.   Neck:      Thyroid: No thyromegaly.   Cardiovascular:      Rate and Rhythm: Normal rate and regular rhythm.      Heart sounds: Normal heart sounds.   Pulmonary:      Effort: Pulmonary effort is normal.      Breath sounds: Normal breath sounds.   Abdominal:      General: Bowel sounds are normal.      Palpations: Abdomen is soft.      Tenderness: There is no abdominal tenderness.   Musculoskeletal:         General: No tenderness or deformity. Normal range of motion.      Cervical back: Normal range of motion and neck supple.   Lymphadenopathy:      Cervical: No cervical adenopathy.   Skin:     General: Skin is warm and dry.   Neurological:      Mental Status: She is alert and oriented to person, place, and time.      Cranial Nerves: No cranial nerve deficit.      Motor: No abnormal muscle tone.      Coordination: Coordination normal.      Deep Tendon Reflexes: Reflexes normal.   Psychiatric:         Behavior: Behavior normal.       Assessment:       1. Essential hypertension    2. Primary insomnia    3. Anxiety    4. Smoking greater than 25 pack years    5. Mild carotid artery disease        Plan:       Bev was seen today for follow-up.    Diagnoses and all orders for this visit:    Essential hypertension    Primary insomnia  -     eszopiclone (LUNESTA) 2 MG Tab; Take 1 tablet (2 mg total) by mouth every evening.    Anxiety    Smoking greater than 25 pack years  -     CT Chest Lung Screening Low Dose; Future    Mild carotid artery disease      During this visit, I reviewed the pt's history, medications, allergies, and problem list.

## 2023-03-07 ENCOUNTER — PATIENT MESSAGE (OUTPATIENT)
Dept: FAMILY MEDICINE | Facility: CLINIC | Age: 73
End: 2023-03-07
Payer: MEDICARE

## 2023-03-07 ENCOUNTER — HOSPITAL ENCOUNTER (OUTPATIENT)
Dept: RADIOLOGY | Facility: HOSPITAL | Age: 73
Discharge: HOME | End: 2023-03-07
Attending: FAMILY MEDICINE
Payer: MEDICARE

## 2023-03-07 DIAGNOSIS — F17.210 SMOKING GREATER THAN 25 PACK YEARS: ICD-10-CM

## 2023-03-07 DIAGNOSIS — R91.8 LUNG MASS: Primary | ICD-10-CM

## 2023-03-07 PROCEDURE — 71271 CT THORAX LUNG CANCER SCR C-: CPT | Mod: TC,PO

## 2023-03-07 PROCEDURE — 71271 CT CHEST LUNG SCREENING LOW DOSE: ICD-10-PCS | Mod: 26,,, | Performed by: RADIOLOGY

## 2023-03-07 PROCEDURE — 71271 CT THORAX LUNG CANCER SCR C-: CPT | Mod: 26,,, | Performed by: RADIOLOGY

## 2023-03-08 ENCOUNTER — TELEPHONE (OUTPATIENT)
Dept: HEMATOLOGY/ONCOLOGY | Facility: CLINIC | Age: 73
End: 2023-03-08
Payer: MEDICARE

## 2023-03-08 NOTE — NURSING
Patient returned call. Appt schedule to see . Appt info provided to her and she verbalized understanding

## 2023-03-08 NOTE — TELEPHONE ENCOUNTER
Pulmonary referral received for Lung Rad 4A and patient meets criteria for lung clinic. Left msg on patient 's VM requesting call back to schedule 1st available appt. Awaiting return call      ----- Message from RT Doroteo sent at 3/8/2023  9:05 AM CST -----  Regarding: CT Chest Lung Screen  Heyy,     This is the second lung screen today. Came back as 4A.

## 2023-03-14 ENCOUNTER — OFFICE VISIT (OUTPATIENT)
Dept: PULMONOLOGY | Facility: CLINIC | Age: 73
End: 2023-03-14
Payer: MEDICARE

## 2023-03-14 ENCOUNTER — TELEPHONE (OUTPATIENT)
Dept: HEMATOLOGY/ONCOLOGY | Facility: CLINIC | Age: 73
End: 2023-03-14
Payer: MEDICARE

## 2023-03-14 VITALS
BODY MASS INDEX: 24.36 KG/M2 | WEIGHT: 155.19 LBS | OXYGEN SATURATION: 100 % | SYSTOLIC BLOOD PRESSURE: 132 MMHG | DIASTOLIC BLOOD PRESSURE: 70 MMHG | HEIGHT: 67 IN | RESPIRATION RATE: 16 BRPM | HEART RATE: 97 BPM | TEMPERATURE: 97 F

## 2023-03-14 DIAGNOSIS — R91.1 SOLITARY PULMONARY NODULE: Primary | ICD-10-CM

## 2023-03-14 DIAGNOSIS — Z72.0 TOBACCO ABUSE: ICD-10-CM

## 2023-03-14 DIAGNOSIS — F17.200 NEEDS SMOKING CESSATION EDUCATION: ICD-10-CM

## 2023-03-14 PROCEDURE — 99999 PR PBB SHADOW E&M-EST. PATIENT-LVL V: CPT | Mod: PBBFAC,,, | Performed by: INTERNAL MEDICINE

## 2023-03-14 PROCEDURE — 99999 PR PBB SHADOW E&M-EST. PATIENT-LVL V: ICD-10-PCS | Mod: PBBFAC,,, | Performed by: INTERNAL MEDICINE

## 2023-03-14 PROCEDURE — 99204 PR OFFICE/OUTPT VISIT, NEW, LEVL IV, 45-59 MIN: ICD-10-PCS | Mod: S$PBB,,, | Performed by: INTERNAL MEDICINE

## 2023-03-14 PROCEDURE — 99215 OFFICE O/P EST HI 40 MIN: CPT | Mod: PBBFAC,PN | Performed by: INTERNAL MEDICINE

## 2023-03-14 PROCEDURE — 99204 OFFICE O/P NEW MOD 45 MIN: CPT | Mod: S$PBB,,, | Performed by: INTERNAL MEDICINE

## 2023-03-14 RX ORDER — AMOXICILLIN 500 MG/1
TABLET, FILM COATED ORAL
COMMUNITY
Start: 2023-03-08 | End: 2023-07-31

## 2023-03-14 NOTE — NURSING
Met with patient in multi disciplinary clinic today.  Explained my role as navigator.  Reviewed plan of care and answered questions.    Dr. Gomez's plan includes to obtain a biopsy, but will present at tumor board for recommendations for approach.   My contact information was provided and pt was encouraged to call with any questions or concerns.  Pt and verbalized an understanding.

## 2023-03-14 NOTE — PROGRESS NOTES
[facility]   Initial Office Visit  Chief Complaint   Patient presents with    Lung Mass     New Patient       SUBJECTIVE:     History of Present Illness:  Patient is a 72 y.o. female presents for evaluation of suspicious lung nodule. She is a current smoker, not interested in quitting right now due to multiple life stressors. She has known she had this lung nodules since at least 2013, and stability was demonstrated in 2015. Her PCP recently decided to pursue lung cancer screening due to her smoking history. She reports she has never been diagnosed with COPD, had PFTs, or taken inhaler therapy. She denies significant dyspnea.    Smoking Hx: Patient reports about 5-7 cigarettes per day for 50 years.    Past Medical History:   Diagnosis Date    Abnormal EKG     Allergy     perennial; sees Dr Jose ENT for injections    Asthma     mild interm    Breast CA     Cancer     ovarian    Chest pain     Chronic insomnia     Colonic polyp     Heart murmur     Hyperlipidemia     Hypoglycemia     Macrocytosis     Mild carotid artery disease 4/29/2019    Mitral valve regurgitation     Osteopenia     Ovarian cancer     SOB (shortness of breath)     Uterine cancer     Valvular regurgitation     Varicose vein of leg     Venous insufficiency     Vitamin C deficiency      Past Surgical History:   Procedure Laterality Date    BREAST SURGERY  05/28/2010    bilateral mastectomy/breast reconstruction; R breast ca.; no XRT/chemo. Femara for 5 yrs.    hip replacement Right 2021    OOPHORECTOMY      sinuplasty      TC  02/20/2017    no polyps; GI/Dr March. 5 yr repeat.    TONSILLECTOMY      URETEROTOMY       Family History   Problem Relation Age of Onset    Early death Mother     Miscarriages / Stillbirths Mother     Cancer Father     Early death Father     Asthma Brother     Early death Brother      Social History     Tobacco Use    Smoking status: Every Day     Packs/day: 0.25     Years: 45.00     Pack years:  25     Types: Cigarettes    Smokeless tobacco: Never   Substance Use Topics    Alcohol use: Yes     Alcohol/week: 2.0 standard drinks     Types: 2 Shots of liquor per week     Comment: 2 cocktails daily; wine 5 glasses a week.    Drug use: No        Review of patient's allergies indicates:   Allergen Reactions    Benadryl [diphenhydramine hcl]     Biaxin [clarithromycin]     Iodine and iodide containing products     Levaquin [levofloxacin]     Sulfa (sulfonamide antibiotics)     Trazodone     Zyban [bupropion hcl (smoking deter)]     Bactrim [sulfamethoxazole-trimethoprim] Rash    Gadolinium-containing contrast media Rash       Current Outpatient Medications on File Prior to Visit   Medication Sig Dispense Refill    alendronate (FOSAMAX) 70 MG tablet TAKE 1 TABLET EVERY 7 DAYS 12 tablet 1    amLODIPine (NORVASC) 5 MG tablet Take 1 tablet (5 mg total) by mouth once daily. 90 tablet 1    amoxicillin (AMOXIL) 500 MG Tab SMARTSI Tablet(s) By Mouth      ascorbic acid, vitamin C, (VITAMIN C) 1000 MG tablet Take 1,000 mg by mouth once daily.      aspirin (ECOTRIN) 81 MG EC tablet Take 81 mg by mouth once daily.      busPIRone (BUSPAR) 7.5 MG tablet Take 1 tablet (7.5 mg total) by mouth every 8 (eight) hours as needed (anxiety). 90 tablet 3    calcium carbonate (OS-ALEK) 600 mg calcium (1,500 mg) Tab Take 600 mg by mouth once.      cyanocobalamin (VITAMIN B-12) 1000 MCG tablet Take 3,000 mcg by mouth once daily.      eszopiclone (LUNESTA) 2 MG Tab Take 1 tablet (2 mg total) by mouth every evening. 90 tablet 1    ibuprofen (ADVIL,MOTRIN) 200 MG tablet Take 200 mg by mouth every 6 (six) hours as needed for Pain.      Lactobacillus rhamnosus GG (CULTURELLE) 10 billion cell capsule Take 1 capsule by mouth once daily.      MULTIVIT-MIN/IRON/FOLIC/LUTEIN (CENTRUM SILVER WOMEN ORAL) Take 1 capsule by mouth once daily.      pravastatin (PRAVACHOL) 10 MG tablet Take 1 tablet (10 mg total) by mouth once daily. 90 tablet 1     "tumeric-ging-olive-oreg-capryl 100 mg-150 mg- 50 mg-150 mg Cap Take by mouth.      vitamin D 1000 units Tab Take 2,000 mg by mouth 2 (two) times a day.      vitamin E 400 UNIT capsule Take 1,000 Units by mouth once daily.       estradioL (VAGIFEM) 10 mcg Tab Place 1 tablet (10 mcg total) vaginally twice a week. 90 tablet 3     No current facility-administered medications on file prior to visit.     Review of Systems   See HPI for pertinent.    OBJECTIVE:     Vital Signs (Most Recent)  Visit Vitals  /70 (BP Location: Right arm, Patient Position: Sitting, BP Method: Medium (Manual))   Pulse 97   Temp 96.8 °F (36 °C) (Temporal)   Resp 16   Ht 5' 7" (1.702 m)   Wt 70.4 kg (155 lb 3.3 oz)   SpO2 100%   BMI 24.31 kg/m²     5' 7" (1.702 m)  70.4 kg (155 lb 3.3 oz)     Physical Exam:    General: no distress   Eye: Normal conjunctiva.  No scleral icterus  HENT: Normocephalic, atraumatic, normal hearing  Neck: Supple, Non-tender, No jugular venous distention.  Respiratory: normal rate and effort, no wheezing   Cardiovascular: RRR   Extremities: no c/c/e  Integumentary: Warm, Dry.   Neurologic: Alert, Oriented, Normal motor function, No focal defects.       Diagnostic Results:    PFT Results    SpO2: 100 %      Significant Imaging:  CT images personally reviewed: 2010, 2011, 2015, 2023. Study from 2013 will not load but 2015 radiology report notes the 5 mm ground glass nodule in the later RUL is unchanged at 5 mm. The nodule is not present on my review of 2010 and 2011 CT scans. The current scan shows enlargement and development of a 4 mm solid component. The appearance is compatible with minimally invasive adenocarcinoma     Test(s) Reviewed  I have personally reviewed the following in detail:  [] Chest Xray Images   [x] CT Images   [] Echocardiogram   [] Labs   [] Other:       Assessment:         ICD-10-CM ICD-9-CM   1. Solitary pulmonary nodule  R91.1 793.11   2. Tobacco abuse  Z72.0 305.1        Plan:   Solitary " pulmonary nodule  -     Ambulatory referral/consult to Pulmonology  -     Complete PFT w/ bronchodilator; Future  -     CT Biopsy Lung w/ guidance; Future; Expected date: 03/15/2023    Tobacco abuse       Discussed at tumor conference. Will proceed with IR biopsy. Probable minimally invasive adenocarcinoma based on radiographic appearance.    F/u pending results of biopys. If negative, RTC 6 months for repeat CT.    See labs and med orders.    Medications have been reviewed and reconciled.      Portions of this note may have been created with voice recognition software.  Grammatical, syntax and spelling errors may be inevitable.

## 2023-03-15 ENCOUNTER — TUMOR BOARD CONFERENCE (OUTPATIENT)
Dept: HEMATOLOGY/ONCOLOGY | Facility: CLINIC | Age: 73
End: 2023-03-15
Payer: MEDICARE

## 2023-03-15 NOTE — PROGRESS NOTES
[facility]      THORACIC MULTIDISCIPLINARY TUMOR BOARD  PATIENT REVIEW FORM  ___________________________________________________________________    CLINIC #: [id number]  DATE: 03/15/2023    TUMOR SITE:   RIght Lung Nodule      Presenting Hospital / Clinic: [facility]     Virtual Tumor Board Conference: In person       PRESENTER:   Presenter: Dr. Ramone Gomez     Specialties Present: Medical Oncology; Hematology; Radiation Oncology; Navigation; Pulmonology; Radiology; Surgical Oncology; Pathology       PATIENT SUMMARY:   Patient is a 72 y.o. female presents for evaluation of suspicious lung nodule. She is a current smoker, not interested in quitting right now due to multiple life stressors. She has known she had this lung nodules since at least 2013, and stability was demonstrated in 2015. Her PCP recently decided to pursue lung cancer screening due to her smoking history. She reports she has never been diagnosed with COPD, had PFTs, or taken inhaler therapy. She denies significant dyspnea.     Smoking Hx: Patient reports about 5-7 cigarettes per day for 50 years.     Background Information  Patient Status: a new patient  Reason for Consultation: Initial Presentation  Biopsy Date:  (N/A)  Treatment to Date: None         BOARD RECOMMENDATIONS:   -Discuss with IR if amendable to biopsy, if not, then follow up with CT chest in 6 months        Cancer Staging   No matching staging information was found for the patient.             [x] [facility] Treatment Guidelines reviewed and care planned is consistent with guidelines.         (i.e., NCCN, NCI, PD, ACO, AUA, etc.)    PRESENTATION AT CANCER CONFERENCE:   Presentation at Cancer Conference: Prospective       CLINICAL TRIAL ELIGIBILITY:   No data recorded     Mary Talbert

## 2023-04-02 NOTE — PROGRESS NOTES
Subjective:    Patient ID:  Bev Miguel is a 72 y.o. female who presents for Non-rheumatic mitral regurgitation        HPI    RECENT LABS NOTED, CMP NORMAL, CBC , LDL 83,HDL 91, TG 82, TO HAVE LUNG BX, SPOT ON LUNG DR MATHEWS, , GOING THROUGH [facility], DECREASED TOBACCO, SEE ROS  Past Medical History:   Diagnosis Date    Abnormal EKG     Allergy     perennial; sees Dr Jose ENT for injections    Asthma     mild interm    Breast CA     Cancer     ovarian    Chest pain     Chronic insomnia     Colonic polyp     Heart murmur     Hyperlipidemia     Hypoglycemia     Macrocytosis     Mild carotid artery disease 4/29/2019    Mitral valve regurgitation     Osteopenia     Ovarian cancer     SOB (shortness of breath)     Uterine cancer     Valvular regurgitation     Varicose vein of leg     Venous insufficiency     Vitamin C deficiency      Past Surgical History:   Procedure Laterality Date    BREAST SURGERY  05/28/2010    bilateral mastectomy/breast reconstruction; R breast ca.; no XRT/chemo. [facility] for 5 yrs.    hip replacement Right 2021    OOPHORECTOMY      sinuplasty      TC  02/20/2017    no polyps; GI/Dr March. 5 yr repeat.    TONSILLECTOMY      URETEROTOMY       Family History   Problem Relation Age of Onset    Early death Mother     Miscarriages / Stillbirths Mother     Cancer Father     Early death Father     Asthma Brother     Early death Brother      Social History     Socioeconomic History    Marital status:     Number of children: 2   Occupational History    Occupation: retired sales management   Tobacco Use    Smoking status: Every Day     Packs/day: 0.25     Years: 45.00     Pack years: 11.25     Types: Cigarettes    Smokeless tobacco: Never   Substance and Sexual Activity    Alcohol use: Yes     Alcohol/week: 2.0 standard drinks     Types: 2 Shots of liquor per week     Comment: 2 cocktails daily; wine 5 glasses a week.    Drug use: No       Review of patient's allergies indicates:    Allergen Reactions    Benadryl [diphenhydramine hcl]     Biaxin [clarithromycin]     Iodine and iodide containing products     Levaquin [levofloxacin]     Sulfa (sulfonamide antibiotics)     Trazodone     Zyban [bupropion hcl (smoking deter)]     Bactrim [sulfamethoxazole-trimethoprim] Rash    Gadolinium-containing contrast media Rash       Current Outpatient Medications:     alendronate (FOSAMAX) 70 MG tablet, TAKE 1 TABLET EVERY 7 DAYS, Disp: 12 tablet, Rfl: 1    amoxicillin (AMOXIL) 500 MG Tab, SMARTSI Tablet(s) By Mouth, Disp: , Rfl:     ascorbic acid, vitamin C, (VITAMIN C) 1000 MG tablet, Take 1,000 mg by mouth once daily., Disp: , Rfl:     aspirin (ECOTRIN) 81 MG EC tablet, Take 81 mg by mouth once daily., Disp: , Rfl:     busPIRone (BUSPAR) 7.5 MG tablet, Take 1 tablet (7.5 mg total) by mouth every 8 (eight) hours as needed (anxiety)., Disp: 90 tablet, Rfl: 3    calcium carbonate (OS-ALEK) 600 mg calcium (1,500 mg) Tab, Take 600 mg by mouth once., Disp: , Rfl:     cyanocobalamin (VITAMIN B-12) 1000 MCG tablet, Take 3,000 mcg by mouth once daily., Disp: , Rfl:     ibuprofen (ADVIL,MOTRIN) 200 MG tablet, Take 200 mg by mouth every 6 (six) hours as needed for Pain., Disp: , Rfl:     Lactobacillus rhamnosus GG (CULTURELLE) 10 billion cell capsule, Take 1 capsule by mouth once daily., Disp: , Rfl:     MULTIVIT-MIN/IRON/FOLIC/LUTEIN (CENTRUM SILVER WOMEN ORAL), Take 1 capsule by mouth once daily., Disp: , Rfl:     tumeric-ging-olive-oreg-capryl 100 mg-150 mg- 50 mg-150 mg Cap, Take by mouth., Disp: , Rfl:     vitamin D 1000 units Tab, Take 2,000 mg by mouth 2 (two) times a day., Disp: , Rfl:     vitamin E 400 UNIT capsule, Take 1,000 Units by mouth once daily. , Disp: , Rfl:     amLODIPine (NORVASC) 5 MG tablet, Take 1 tablet (5 mg total) by mouth once daily., Disp: 90 tablet, Rfl: 2    estradioL (VAGIFEM) 10 mcg Tab, Place 1 tablet (10 mcg total) vaginally twice a week., Disp: 90 tablet, Rfl: 3    pravastatin  "(PRAVACHOL) 10 MG tablet, Take 1 tablet (10 mg total) by mouth once daily., Disp: 90 tablet, Rfl: 2    Review of Systems   Constitutional: Negative for chills, diaphoresis, fever, malaise/fatigue and night sweats.   HENT:  Negative for congestion and sore throat.         TEETH ISSUES   Eyes: Negative.    Cardiovascular:  Negative for chest pain, claudication, cyanosis, dyspnea on exertion, irregular heartbeat, leg swelling, near-syncope, orthopnea, palpitations, paroxysmal nocturnal dyspnea and syncope.   Respiratory:  Negative for cough, hemoptysis, shortness of breath and wheezing.    Endocrine: Negative for polyphagia and polyuria.   Hematologic/Lymphatic: Negative for adenopathy. Does not bruise/bleed easily.   Skin:  Negative for color change (TOES,LESS) and rash.   Musculoskeletal:  Negative for back pain, falls and muscle weakness.   Gastrointestinal:  Negative for abdominal pain, dysphagia, jaundice, melena and nausea.   Genitourinary:  Negative for dysuria and flank pain.   Neurological:  Negative for focal weakness, headaches, light-headedness, loss of balance, paresthesias and weakness.   Psychiatric/Behavioral:  Negative for altered mental status and depression.    Allergic/Immunologic: Negative for persistent infections.      Objective:      Vitals:    04/04/23 1337   BP: 132/65   Pulse: 77   Weight: 70.9 kg (156 lb 4.9 oz)   Height: 5' 7" (1.702 m)   PainSc: 0-No pain     Body mass index is 24.48 kg/m².    Physical Exam  Constitutional:       Appearance: Normal appearance. She is well-developed.   HENT:      Head: Normocephalic and atraumatic.   Eyes:      Extraocular Movements: Extraocular movements intact.      Right eye: Normal extraocular motion.      Conjunctiva/sclera: Conjunctivae normal.   Neck:      Vascular: Normal carotid pulses. No JVD.   Cardiovascular:      Rate and Rhythm: Normal rate and regular rhythm.      Pulses:           Carotid pulses are 2+ on the right side and 2+ on the left " side.       Radial pulses are 2+ on the right side and 2+ on the left side.        Posterior tibial pulses are 2+ on the right side and 2+ on the left side.      Heart sounds: Murmur heard.   Systolic murmur is present with a grade of 1/6.     No friction rub. No gallop. No S4 sounds.   Pulmonary:      Effort: Pulmonary effort is normal.      Breath sounds: Normal breath sounds and air entry. No rales.   Abdominal:      Palpations: Abdomen is soft. There is no hepatomegaly.      Tenderness: There is no abdominal tenderness.   Musculoskeletal:      Cervical back: Neck supple.      Right lower leg: No edema.      Left lower leg: No edema.   Skin:     General: Skin is warm and dry.      Capillary Refill: Capillary refill takes 2 to 3 seconds.      Comments:     Neurological:      General: No focal deficit present.      Mental Status: She is alert and oriented to person, place, and time.      Cranial Nerves: Cranial nerves 2-12 are intact.   Psychiatric:         Mood and Affect: Mood normal.         Speech: Speech normal.         Behavior: Behavior normal.               ..    Chemistry        Component Value Date/Time     02/22/2023 0734    K 4.8 02/22/2023 0734     02/22/2023 0734    CO2 25 02/22/2023 0734    BUN 12 02/22/2023 0734    CREATININE 0.8 02/22/2023 0734    GLU 94 02/22/2023 0734        Component Value Date/Time    CALCIUM 9.4 02/22/2023 0734    ALKPHOS 70 02/22/2023 0734    AST 21 02/22/2023 0734    AST 27 03/14/2016 1523    ALT 18 02/22/2023 0734    BILITOT 0.8 02/22/2023 0734    ESTGFRAFRICA >60.0 12/06/2021 0730    ESTGFRAFRICA >60.0 12/06/2021 0730    EGFRNONAA >60.0 12/06/2021 0730    EGFRNONAA >60.0 12/06/2021 0730            ..  Lab Results   Component Value Date    CHOL 191 02/22/2023    CHOL 198 12/06/2021    CHOL 213 (H) 07/09/2019     Lab Results   Component Value Date    HDL 91 (H) 02/22/2023    HDL 88 (H) 12/06/2021    HDL 97 (H) 07/09/2019     Lab Results   Component Value Date     LDLCALC 83.6 02/22/2023    LDLCALC 98.0 12/06/2021    LDLCALC 106.0 07/09/2019     Lab Results   Component Value Date    TRIG 82 02/22/2023    TRIG 60 12/06/2021    TRIG 50 07/09/2019     Lab Results   Component Value Date    CHOLHDL 47.6 02/22/2023    CHOLHDL 44.4 12/06/2021    CHOLHDL 45.5 07/09/2019     ..  Lab Results   Component Value Date    WBC 7.55 02/22/2023    HGB 13.8 02/22/2023    HCT 42.2 02/22/2023     (H) 02/22/2023     02/22/2023       Test(s) Reviewed  I have reviewed the following in detail:  [] Stress test   [] Angiography   [] Echocardiogram   [x] Labs   [x] Other:       Assessment:         ICD-10-CM ICD-9-CM   1. Non-rheumatic mitral regurgitation  I34.0 424.0   2. Raynaud's disease without gangrene  I73.00 443.0   3. Mild carotid artery disease  I77.9 447.9   4. Thoracic aortic atherosclerosis  I70.0 440.0   5. Essential hypertension  I10 401.9   6. Tobacco use  Z72.0 305.1     Problem List Items Addressed This Visit          Cardiac/Vascular    Raynaud's disease without gangrene    Non-rheumatic mitral regurgitation - Primary    Essential hypertension    Mild carotid artery disease    Thoracic aortic atherosclerosis    Overview     cxr 2019              Other    Tobacco use        Plan:     TOBACCO CESSATION COUNSELING AVOID SUDDEN CHANGE IN TEMPERATURE EXPOSURE TO COLD,ALL CV CLINICALLY STABLE, NO ANGINA, NO HF, NO TIA, NO CLINICAL ARRHYTHMIA,CONTINUE CURRENT MEDS, EDUCATION, DIET, EXERCISE , RETURN TO CLINIC 9 MO      Non-rheumatic mitral regurgitation    Raynaud's disease without gangrene    Mild carotid artery disease    Thoracic aortic atherosclerosis  Comments:  NO EMBOLIZATION    Essential hypertension    Tobacco use    Other orders  -     amLODIPine (NORVASC) 5 MG tablet; Take 1 tablet (5 mg total) by mouth once daily.  Dispense: 90 tablet; Refill: 2  -     pravastatin (PRAVACHOL) 10 MG tablet; Take 1 tablet (10 mg total) by mouth once daily.  Dispense: 90 tablet; Refill:  2    RTC Low level/low impact aerobic exercise 5x's/wk. Heart healthy diet and risk factor modification.    See labs and med orders.    Aerobic exercise 5x's/wk. Heart healthy diet and risk factor modification.    See labs and med orders.

## 2023-04-04 ENCOUNTER — OFFICE VISIT (OUTPATIENT)
Dept: CARDIOLOGY | Facility: CLINIC | Age: 73
End: 2023-04-04
Payer: MEDICARE

## 2023-04-04 VITALS
HEART RATE: 77 BPM | HEIGHT: 67 IN | BODY MASS INDEX: 24.53 KG/M2 | DIASTOLIC BLOOD PRESSURE: 65 MMHG | SYSTOLIC BLOOD PRESSURE: 132 MMHG | WEIGHT: 156.31 LBS

## 2023-04-04 DIAGNOSIS — I77.9 MILD CAROTID ARTERY DISEASE: Chronic | ICD-10-CM

## 2023-04-04 DIAGNOSIS — I10 ESSENTIAL HYPERTENSION: Chronic | ICD-10-CM

## 2023-04-04 DIAGNOSIS — I34.0 NON-RHEUMATIC MITRAL REGURGITATION: Primary | Chronic | ICD-10-CM

## 2023-04-04 DIAGNOSIS — I73.00 RAYNAUD'S DISEASE WITHOUT GANGRENE: Chronic | ICD-10-CM

## 2023-04-04 DIAGNOSIS — Z72.0 TOBACCO USE: Chronic | ICD-10-CM

## 2023-04-04 DIAGNOSIS — I70.0 THORACIC AORTIC ATHEROSCLEROSIS: Chronic | ICD-10-CM

## 2023-04-04 PROCEDURE — 99999 PR PBB SHADOW E&M-EST. PATIENT-LVL III: ICD-10-PCS | Mod: PBBFAC,,, | Performed by: INTERNAL MEDICINE

## 2023-04-04 PROCEDURE — 99213 OFFICE O/P EST LOW 20 MIN: CPT | Mod: PBBFAC,PO | Performed by: INTERNAL MEDICINE

## 2023-04-04 PROCEDURE — 99999 PR PBB SHADOW E&M-EST. PATIENT-LVL III: CPT | Mod: PBBFAC,,, | Performed by: INTERNAL MEDICINE

## 2023-04-04 PROCEDURE — 99214 PR OFFICE/OUTPT VISIT, EST, LEVL IV, 30-39 MIN: ICD-10-PCS | Mod: S$PBB,,, | Performed by: INTERNAL MEDICINE

## 2023-04-04 PROCEDURE — 99214 OFFICE O/P EST MOD 30 MIN: CPT | Mod: S$PBB,,, | Performed by: INTERNAL MEDICINE

## 2023-04-04 RX ORDER — PRAVASTATIN SODIUM 10 MG/1
10 TABLET ORAL DAILY
Qty: 90 TABLET | Refills: 2 | Status: SHIPPED | OUTPATIENT
Start: 2023-04-04 | End: 2024-01-05 | Stop reason: SDUPTHER

## 2023-04-04 RX ORDER — AMLODIPINE BESYLATE 5 MG/1
5 TABLET ORAL DAILY
Qty: 90 TABLET | Refills: 2 | Status: SHIPPED | OUTPATIENT
Start: 2023-04-04

## 2023-04-06 ENCOUNTER — PES CALL (OUTPATIENT)
Dept: ADMINISTRATIVE | Facility: CLINIC | Age: 73
End: 2023-04-06
Payer: MEDICARE

## 2023-05-10 ENCOUNTER — TELEPHONE (OUTPATIENT)
Dept: HEMATOLOGY/ONCOLOGY | Facility: CLINIC | Age: 73
End: 2023-05-10
Payer: MEDICARE

## 2023-05-10 NOTE — NURSING
Chart reviewed. Dr. Gomez has spoken to patient with results of her recent biopsy and plan is to f/u with him at [facility] and CT chest in 6 months. No cancer navigation needs at this time

## 2023-07-31 ENCOUNTER — OFFICE VISIT (OUTPATIENT)
Dept: FAMILY MEDICINE | Facility: CLINIC | Age: 73
End: 2023-07-31
Payer: MEDICARE

## 2023-07-31 VITALS
WEIGHT: 155.19 LBS | SYSTOLIC BLOOD PRESSURE: 118 MMHG | BODY MASS INDEX: 24.36 KG/M2 | HEART RATE: 79 BPM | OXYGEN SATURATION: 99 % | DIASTOLIC BLOOD PRESSURE: 78 MMHG | HEIGHT: 67 IN

## 2023-07-31 DIAGNOSIS — M81.0 OSTEOPOROSIS, UNSPECIFIED OSTEOPOROSIS TYPE, UNSPECIFIED PATHOLOGICAL FRACTURE PRESENCE: ICD-10-CM

## 2023-07-31 DIAGNOSIS — R53.83 FATIGUE, UNSPECIFIED TYPE: ICD-10-CM

## 2023-07-31 DIAGNOSIS — F33.0 MILD EPISODE OF RECURRENT MAJOR DEPRESSIVE DISORDER: Primary | ICD-10-CM

## 2023-07-31 DIAGNOSIS — F51.01 PRIMARY INSOMNIA: ICD-10-CM

## 2023-07-31 PROCEDURE — 99999 PR PBB SHADOW E&M-EST. PATIENT-LVL III: ICD-10-PCS | Mod: PBBFAC,,, | Performed by: FAMILY MEDICINE

## 2023-07-31 PROCEDURE — 99214 PR OFFICE/OUTPT VISIT, EST, LEVL IV, 30-39 MIN: ICD-10-PCS | Mod: S$PBB,,, | Performed by: FAMILY MEDICINE

## 2023-07-31 PROCEDURE — 99999 PR PBB SHADOW E&M-EST. PATIENT-LVL III: CPT | Mod: PBBFAC,,, | Performed by: FAMILY MEDICINE

## 2023-07-31 PROCEDURE — 99214 OFFICE O/P EST MOD 30 MIN: CPT | Mod: S$PBB,,, | Performed by: FAMILY MEDICINE

## 2023-07-31 PROCEDURE — 99213 OFFICE O/P EST LOW 20 MIN: CPT | Mod: PBBFAC,PO | Performed by: FAMILY MEDICINE

## 2023-07-31 RX ORDER — ALENDRONATE SODIUM 70 MG/1
70 TABLET ORAL
Qty: 12 TABLET | Refills: 1 | Status: SHIPPED | OUTPATIENT
Start: 2023-07-31

## 2023-07-31 RX ORDER — ESZOPICLONE 3 MG/1
3 TABLET, FILM COATED ORAL NIGHTLY
Qty: 90 TABLET | Refills: 1 | Status: SHIPPED | OUTPATIENT
Start: 2023-07-31 | End: 2023-08-30

## 2023-07-31 RX ORDER — VENLAFAXINE HYDROCHLORIDE 37.5 MG/1
37.5 CAPSULE, EXTENDED RELEASE ORAL DAILY
Qty: 90 CAPSULE | Refills: 1 | Status: SHIPPED | OUTPATIENT
Start: 2023-07-31 | End: 2023-08-28 | Stop reason: SINTOL

## 2023-07-31 NOTE — PROGRESS NOTES
Subjective:       Patient ID: Bev Miguel is a 72 y.o. female.    Chief Complaint: medication changes and Fatigue    Pt is known to me.   The pt stopped the Buspar because she didn't like how it made her feel.  She is in the midst of a difficult divorce.  She is having fatigue, difficulty with motivation and anhedonia.  She is seeing a therapist monthly.    She falls asleep with the Lunesta but she awakens during the night 2-3 times but she can go back to sleep the first time but then she has difficulty staying asleep.    Fatigue  Associated symptoms include fatigue.     Review of Systems   Constitutional:  Positive for fatigue.   Psychiatric/Behavioral:  Positive for dysphoric mood and sleep disturbance. Negative for suicidal ideas. The patient is nervous/anxious.        Objective:      Physical Exam  Vitals and nursing note reviewed.   Constitutional:       General: She is not in acute distress.     Appearance: Normal appearance. She is well-developed. She is not ill-appearing.   HENT:      Head: Normocephalic.      Mouth/Throat:      Mouth: Mucous membranes are moist.      Pharynx: Oropharynx is clear.      Comments: Tongue looks normal  Eyes:      Conjunctiva/sclera: Conjunctivae normal.      Pupils: Pupils are equal, round, and reactive to light.   Neck:      Thyroid: No thyromegaly.   Cardiovascular:      Rate and Rhythm: Normal rate and regular rhythm.      Heart sounds: Normal heart sounds.   Pulmonary:      Effort: Pulmonary effort is normal.      Breath sounds: Normal breath sounds.   Abdominal:      General: Bowel sounds are normal.      Palpations: Abdomen is soft.      Tenderness: There is no abdominal tenderness.   Musculoskeletal:         General: No tenderness or deformity. Normal range of motion.      Cervical back: Normal range of motion and neck supple.   Lymphadenopathy:      Cervical: No cervical adenopathy.   Skin:     General: Skin is warm and dry.   Neurological:      General: No focal  deficit present.      Mental Status: She is alert and oriented to person, place, and time.      Cranial Nerves: No cranial nerve deficit.      Motor: No abnormal muscle tone.      Coordination: Coordination normal.      Deep Tendon Reflexes: Reflexes normal.   Psychiatric:         Behavior: Behavior normal.      Comments: Midly anxious mood         Assessment:       1. Mild episode of recurrent major depressive disorder    2. Osteoporosis, unspecified osteoporosis type, unspecified pathological fracture presence    3. Fatigue, unspecified type    4. Primary insomnia        Plan:       Bev was seen today for medication changes and fatigue.    Diagnoses and all orders for this visit:    Mild episode of recurrent major depressive disorder  -     venlafaxine (EFFEXOR-XR) 37.5 MG 24 hr capsule; Take 1 capsule (37.5 mg total) by mouth once daily.    Osteoporosis, unspecified osteoporosis type, unspecified pathological fracture presence  -     alendronate (FOSAMAX) 70 MG tablet; Take 1 tablet (70 mg total) by mouth every 7 days.    Fatigue, unspecified type  -     TSH; Future  -     T4, Free; Future    Primary insomnia  -     eszopiclone (LUNESTA) 3 mg Tab; Take 1 tablet (3 mg total) by mouth every evening.      During this visit, I reviewed the pt's history, medications, allergies, and problem list.

## 2023-08-16 ENCOUNTER — PATIENT MESSAGE (OUTPATIENT)
Dept: FAMILY MEDICINE | Facility: CLINIC | Age: 73
End: 2023-08-16
Payer: MEDICARE

## 2023-08-28 ENCOUNTER — OFFICE VISIT (OUTPATIENT)
Dept: FAMILY MEDICINE | Facility: CLINIC | Age: 73
End: 2023-08-28
Payer: MEDICARE

## 2023-08-28 VITALS
DIASTOLIC BLOOD PRESSURE: 82 MMHG | HEART RATE: 88 BPM | OXYGEN SATURATION: 99 % | BODY MASS INDEX: 24.05 KG/M2 | SYSTOLIC BLOOD PRESSURE: 130 MMHG | HEIGHT: 67 IN | WEIGHT: 153.25 LBS

## 2023-08-28 DIAGNOSIS — Z72.0 TOBACCO USE: ICD-10-CM

## 2023-08-28 DIAGNOSIS — R91.1 PULMONARY NODULE: ICD-10-CM

## 2023-08-28 DIAGNOSIS — F41.9 ANXIETY: Primary | ICD-10-CM

## 2023-08-28 DIAGNOSIS — I10 ESSENTIAL HYPERTENSION: ICD-10-CM

## 2023-08-28 PROCEDURE — 99213 OFFICE O/P EST LOW 20 MIN: CPT | Mod: PBBFAC,PO | Performed by: FAMILY MEDICINE

## 2023-08-28 PROCEDURE — 99999 PR PBB SHADOW E&M-EST. PATIENT-LVL III: CPT | Mod: PBBFAC,,, | Performed by: FAMILY MEDICINE

## 2023-08-28 PROCEDURE — 99213 OFFICE O/P EST LOW 20 MIN: CPT | Mod: S$PBB,,, | Performed by: FAMILY MEDICINE

## 2023-08-28 PROCEDURE — 99999 PR PBB SHADOW E&M-EST. PATIENT-LVL III: ICD-10-PCS | Mod: PBBFAC,,, | Performed by: FAMILY MEDICINE

## 2023-08-28 PROCEDURE — 99213 PR OFFICE/OUTPT VISIT, EST, LEVL III, 20-29 MIN: ICD-10-PCS | Mod: S$PBB,,, | Performed by: FAMILY MEDICINE

## 2023-08-28 RX ORDER — ESCITALOPRAM OXALATE 5 MG/1
5 TABLET ORAL DAILY
Qty: 30 TABLET | Refills: 1 | Status: SHIPPED | OUTPATIENT
Start: 2023-08-28 | End: 2023-09-20 | Stop reason: SDUPTHER

## 2023-08-28 NOTE — PROGRESS NOTES
Subjective:       Patient ID: Bev Miguel is a 72 y.o. female.    Chief Complaint: Follow-up    Pt is known to me.  Pt is here for followup of chronic medical issues.  The pt stopped the Effexor for blurred vision.  She had a neg eye exam and the blurriness is resolved.  She continues the anxiety and mild depression while in the process of a difficult divorce.  She sleeps okay but wakes up a lot.  She uses Lunesta.  She has good BP control.  She has an upcoming CT chest for Dr. Gomez to follow a pulmonary nodule.  She still is smoking.  Discussed health maintenance with pt and will schedule appropriate studies and/or immunizations.      Follow-up  Associated symptoms include fatigue.   Review of Systems   Constitutional:  Positive for fatigue.   Psychiatric/Behavioral:  Positive for dysphoric mood and sleep disturbance. Negative for suicidal ideas. The patient is nervous/anxious.    All other systems reviewed and are negative.      Objective:      Physical Exam  Vitals and nursing note reviewed.   Constitutional:       Appearance: She is well-developed.   HENT:      Head: Normocephalic.   Eyes:      Conjunctiva/sclera: Conjunctivae normal.      Pupils: Pupils are equal, round, and reactive to light.   Neck:      Thyroid: No thyromegaly.   Cardiovascular:      Rate and Rhythm: Normal rate and regular rhythm.      Heart sounds: Normal heart sounds.   Pulmonary:      Effort: Pulmonary effort is normal.      Breath sounds: Normal breath sounds.   Abdominal:      General: Bowel sounds are normal.      Palpations: Abdomen is soft.      Tenderness: There is no abdominal tenderness.   Musculoskeletal:         General: No tenderness or deformity. Normal range of motion.      Cervical back: Normal range of motion and neck supple.   Lymphadenopathy:      Cervical: No cervical adenopathy.   Skin:     General: Skin is warm and dry.   Neurological:      Mental Status: She is alert and oriented to person, place, and time.       Cranial Nerves: No cranial nerve deficit.      Motor: No abnormal muscle tone.      Coordination: Coordination normal.      Deep Tendon Reflexes: Reflexes normal.   Psychiatric:         Behavior: Behavior normal.      Comments: Mildly depressed         Assessment:       1. Anxiety    2. Essential hypertension    3. Tobacco use    4. Pulmonary nodule        Plan:       Bev was seen today for follow-up.    Diagnoses and all orders for this visit:    Anxiety  -     EScitalopram oxalate (LEXAPRO) 5 MG Tab; Take 1 tablet (5 mg total) by mouth once daily.    Essential hypertension    Tobacco use    Pulmonary nodule      During this visit, I reviewed the pt's history, medications, allergies, and problem list.

## 2023-09-11 ENCOUNTER — HOSPITAL ENCOUNTER (OUTPATIENT)
Dept: RADIOLOGY | Facility: HOSPITAL | Age: 73
Discharge: HOME | End: 2023-09-11
Attending: INTERNAL MEDICINE
Payer: MEDICARE

## 2023-09-11 DIAGNOSIS — R91.1 SOLITARY PULMONARY NODULE: ICD-10-CM

## 2023-09-11 PROCEDURE — 71250 CT THORAX DX C-: CPT | Mod: TC,PO

## 2023-09-11 PROCEDURE — 71250 CT CHEST WITHOUT CONTRAST: ICD-10-PCS | Mod: 26,,, | Performed by: RADIOLOGY

## 2023-09-11 PROCEDURE — 71250 CT THORAX DX C-: CPT | Mod: 26,,, | Performed by: RADIOLOGY

## 2023-09-20 ENCOUNTER — PATIENT MESSAGE (OUTPATIENT)
Dept: FAMILY MEDICINE | Facility: CLINIC | Age: 73
End: 2023-09-20
Payer: MEDICARE

## 2023-09-20 DIAGNOSIS — F41.9 ANXIETY: ICD-10-CM

## 2023-09-20 RX ORDER — ESCITALOPRAM OXALATE 5 MG/1
5 TABLET ORAL DAILY
Qty: 90 TABLET | Refills: 3 | Status: SHIPPED | OUTPATIENT
Start: 2023-09-20 | End: 2023-10-10

## 2023-09-20 NOTE — TELEPHONE ENCOUNTER
No care due was identified.  Health [facility] Embedded Care Due Messages. Reference number: [id number].   9/20/2023 11:24:49 AM CDT

## 2023-09-24 ENCOUNTER — PATIENT MESSAGE (OUTPATIENT)
Dept: FAMILY MEDICINE | Facility: CLINIC | Age: 73
End: 2023-09-24
Payer: MEDICARE

## 2023-10-10 ENCOUNTER — OFFICE VISIT (OUTPATIENT)
Dept: FAMILY MEDICINE | Facility: CLINIC | Age: 73
End: 2023-10-10
Payer: MEDICARE

## 2023-10-10 VITALS
HEIGHT: 67 IN | OXYGEN SATURATION: 99 % | WEIGHT: 152.75 LBS | BODY MASS INDEX: 23.98 KG/M2 | SYSTOLIC BLOOD PRESSURE: 124 MMHG | DIASTOLIC BLOOD PRESSURE: 68 MMHG | HEART RATE: 78 BPM

## 2023-10-10 DIAGNOSIS — I77.9 MILD CAROTID ARTERY DISEASE: ICD-10-CM

## 2023-10-10 DIAGNOSIS — Z78.9 ALCOHOL USE: ICD-10-CM

## 2023-10-10 DIAGNOSIS — Z00.00 ENCOUNTER FOR PREVENTIVE HEALTH EXAMINATION: Primary | ICD-10-CM

## 2023-10-10 DIAGNOSIS — R53.83 FATIGUE, UNSPECIFIED TYPE: ICD-10-CM

## 2023-10-10 DIAGNOSIS — F43.23 ADJUSTMENT REACTION WITH ANXIETY AND DEPRESSION: ICD-10-CM

## 2023-10-10 DIAGNOSIS — I70.0 THORACIC AORTIC ATHEROSCLEROSIS: ICD-10-CM

## 2023-10-10 PROBLEM — F43.22 ADJUSTMENT DISORDER WITH ANXIETY: Status: ACTIVE | Noted: 2023-10-10

## 2023-10-10 PROBLEM — F41.9 ANXIETY: Status: RESOLVED | Noted: 2022-10-24 | Resolved: 2023-10-10

## 2023-10-10 PROCEDURE — 99214 OFFICE O/P EST MOD 30 MIN: CPT | Mod: PBBFAC,PO | Performed by: NURSE PRACTITIONER

## 2023-10-10 PROCEDURE — G0439 PR MEDICARE ANNUAL WELLNESS SUBSEQUENT VISIT: ICD-10-PCS | Mod: ,,, | Performed by: NURSE PRACTITIONER

## 2023-10-10 PROCEDURE — 99999 PR PBB SHADOW E&M-EST. PATIENT-LVL IV: ICD-10-PCS | Mod: PBBFAC,,, | Performed by: NURSE PRACTITIONER

## 2023-10-10 PROCEDURE — 99999 PR PBB SHADOW E&M-EST. PATIENT-LVL IV: CPT | Mod: PBBFAC,,, | Performed by: NURSE PRACTITIONER

## 2023-10-10 PROCEDURE — G0439 PPPS, SUBSEQ VISIT: HCPCS | Mod: ,,, | Performed by: NURSE PRACTITIONER

## 2023-10-10 RX ORDER — ESCITALOPRAM OXALATE 10 MG/1
10 TABLET ORAL DAILY
Qty: 90 TABLET | Refills: 3 | Status: SHIPPED | OUTPATIENT
Start: 2023-10-10 | End: 2024-10-09

## 2023-10-10 NOTE — PROGRESS NOTES
"Bev Miguel presented for a  Medicare AWV and comprehensive Health Risk Assessment today. The following components were reviewed and updated:    Medical history  Family History  Social history  Allergies and Current Medications  Health Risk Assessment  Health Maintenance  Care Team     ** See Completed Assessments for Annual Wellness Visit within the encounter summary.**     The following assessments were completed:  Living Situation  CAGE  Depression Screening  Timed Get Up and Go  Whisper Test  Co  gnitive Function Screening    Nutrition Screening  ADL Screening  PAQ Screening    Review for Opioid Screening: Pt does not have Rx for Opioids  Review for Substance Use Disorders: Patient does not use substance      Vitals:    10/10/23 0701   BP: 124/68   BP Location: Left arm   Patient Position: Sitting   BP Method: Medium (Manual)   Pulse: 78   SpO2: 99%   Weight: 69.3 kg (152 lb 12.5 oz)   Height: 5' 7" (1.702 m)     Body mass index is 23.93 kg/m².  Physical Exam  Vitals reviewed.   Constitutional:       General: She is not in acute distress.  Cardiovascular:      Rate and Rhythm: Normal rate and regular rhythm.      Pulses: Normal pulses.      Heart sounds: Normal heart sounds.   Pulmonary:      Effort: Pulmonary effort is normal. No respiratory distress.      Breath sounds: Normal breath sounds.   Skin:     General: Skin is warm and dry.   Neurological:      Mental Status: She is alert and oriented to person, place, and time.   Psychiatric:         Mood and Affect: Mood normal.         Behavior: Behavior normal.         Thought Content: Thought content normal.         Judgment: Judgment normal.     Diagnoses and health risks identified today and associated recommendations/orders:    1. Encounter for preventive health examination  Reviewed and discussed health maintenance.    - Lipid Panel; Future  - Comprehensive Metabolic Panel; Future  - CBC Auto Differential; Future    2. Mild carotid artery disease  Stable- " continue current treatment and follow up routinely with PCP and cardiology ()  - Lipid Panel; Future  - Comprehensive Metabolic Panel; Future    3. Thoracic aortic atherosclerosis  Stable- continue current treatment and follow up routinely with PCP and cardiology ()  - Lipid Panel; Future  - Comprehensive Metabolic Panel; Future    4. Fatigue, unspecified type  Stable- continue current treatment and follow up routinely with PCP and cardiology ()  - CBC Auto Differential; Future    5. Adjustment reaction with anxiety and depression  Increase lexapro 10mg daily  Stable- continue current treatment and follow up routinely with PCP and therapy     6. Alcohol use  Increase lexapro 10mg daily  Stable- continue current treatment and follow up routinely with PCP and therapy    Provided Bev with a 5-10 year written screening schedule and personal prevention plan. Recommendations were developed using the USPSTF age appropriate recommendations. Education, counseling, and referrals were provided as needed. After Visit Summary printed and given to patient which includes a list of additional screenings\tests needed.    I offered to discuss advanced care planning, including how to pick a person who would make decisions for you if you were unable to make them for yourself, called a health care power of , and what kind of decisions you might make such as use of life sustaining treatments such as ventilators and tube feeding when faced with a life limiting illness recorded on a living will that they will need to know. (How you want to be cared for as you near the end of your natural life)   X  Patient has advanced directives written and agrees to provide copies to the institution.  Jaylyn Mathew NP

## 2023-10-10 NOTE — PATIENT INSTRUCTIONS
Counseling and Referral of Other Preventative  (Italic type indicates deductible and co-insurance are waived)    Patient Name: Bev Miguel  Today's Date: 10/10/2023    Health Maintenance       Date Due Completion Date    High Dose Statin Never done ---    COVID-19 Vaccine (5 - 2023-24 season) 09/01/2023 11/2/2022    Lipid Panel 02/22/2024 2/22/2023    DEXA Scan 12/13/2024 12/13/2021    TETANUS VACCINE 06/18/2028 6/18/2018    Colorectal Cancer Screening 01/11/2033 1/11/2023    Override on 4/15/2017: Done (Dr March GI; no polyps; repeat 5 yrs.)        No orders of the defined types were placed in this encounter.    The following information is provided to all patients.  This information is to help you find resources for any of the problems found today that may be affecting your health:                Living healthy guide: www.[facility].[place].[facility]      Understanding Diabetes: www.diabetes.org      Eating healthy: www.cdc.gov/healthyweight      CDC home safety checklist: www.cdc.gov/steadi/patient.html      Agency on Aging: www.goea.[place].[facility]      Alcoholics anonymous (AA): www.aa.org      Physical Activity: www.guicho.nih.gov/vg3xgjm      Tobacco use: www.quitwithusla.org

## 2023-10-25 ENCOUNTER — PATIENT MESSAGE (OUTPATIENT)
Dept: FAMILY MEDICINE | Facility: CLINIC | Age: 73
End: 2023-10-25
Payer: MEDICARE

## 2023-12-06 ENCOUNTER — OFFICE VISIT (OUTPATIENT)
Dept: FAMILY MEDICINE | Facility: CLINIC | Age: 73
End: 2023-12-06
Payer: MEDICARE

## 2023-12-06 VITALS
WEIGHT: 149.81 LBS | DIASTOLIC BLOOD PRESSURE: 80 MMHG | HEIGHT: 67 IN | HEART RATE: 90 BPM | BODY MASS INDEX: 23.51 KG/M2 | OXYGEN SATURATION: 99 % | SYSTOLIC BLOOD PRESSURE: 136 MMHG

## 2023-12-06 DIAGNOSIS — F43.23 ADJUSTMENT REACTION WITH ANXIETY AND DEPRESSION: Primary | ICD-10-CM

## 2023-12-06 DIAGNOSIS — F51.01 PRIMARY INSOMNIA: ICD-10-CM

## 2023-12-06 PROCEDURE — 99213 OFFICE O/P EST LOW 20 MIN: CPT | Mod: S$PBB,,, | Performed by: FAMILY MEDICINE

## 2023-12-06 PROCEDURE — 99999 PR PBB SHADOW E&M-EST. PATIENT-LVL III: ICD-10-PCS | Mod: PBBFAC,,, | Performed by: FAMILY MEDICINE

## 2023-12-06 PROCEDURE — 99999 PR PBB SHADOW E&M-EST. PATIENT-LVL III: CPT | Mod: PBBFAC,,, | Performed by: FAMILY MEDICINE

## 2023-12-06 PROCEDURE — 99213 OFFICE O/P EST LOW 20 MIN: CPT | Mod: PBBFAC,PO | Performed by: FAMILY MEDICINE

## 2023-12-06 PROCEDURE — 99213 PR OFFICE/OUTPT VISIT, EST, LEVL III, 20-29 MIN: ICD-10-PCS | Mod: S$PBB,,, | Performed by: FAMILY MEDICINE

## 2023-12-06 RX ORDER — ESZOPICLONE 2 MG/1
2 TABLET, FILM COATED ORAL NIGHTLY
Qty: 90 TABLET | Refills: 1 | Status: SHIPPED | OUTPATIENT
Start: 2023-12-06

## 2023-12-06 NOTE — PROGRESS NOTES
Subjective:       Patient ID: Bev Miguel is a 73 y.o. female.    Chief Complaint: Follow-up    Pt is known to me.  Pt is here for followup of chronic medical issues.  The pt is doing well on the 5 mg of Lexapro.  Her divorce becomes final today and she is doing okay with it.  She is having a divorce party tomorrow with 9 of her friends.  Otherwise she is doing well.  Discussed health maintenance with pt and will schedule appropriate studies and/or immunizations.      Follow-up  Pertinent negatives include no fatigue.     Review of Systems   Constitutional:  Negative for fatigue.   Psychiatric/Behavioral:  Positive for sleep disturbance. Negative for dysphoric mood and suicidal ideas. The patient is not nervous/anxious.    All other systems reviewed and are negative.      Objective:      Physical Exam  Vitals and nursing note reviewed.   Constitutional:       Appearance: She is well-developed.   HENT:      Head: Normocephalic.   Eyes:      Conjunctiva/sclera: Conjunctivae normal.      Pupils: Pupils are equal, round, and reactive to light.   Neck:      Thyroid: No thyromegaly.   Cardiovascular:      Rate and Rhythm: Normal rate and regular rhythm.      Heart sounds: Normal heart sounds.   Pulmonary:      Effort: Pulmonary effort is normal.      Breath sounds: Normal breath sounds.   Abdominal:      General: Bowel sounds are normal.      Palpations: Abdomen is soft.      Tenderness: There is no abdominal tenderness.   Musculoskeletal:         General: No tenderness or deformity. Normal range of motion.      Cervical back: Normal range of motion and neck supple.      Right lower leg: No edema.      Left lower leg: No edema.   Lymphadenopathy:      Cervical: No cervical adenopathy.   Skin:     General: Skin is warm and dry.   Neurological:      Mental Status: She is alert and oriented to person, place, and time.      Cranial Nerves: No cranial nerve deficit.      Motor: No abnormal muscle tone.      Coordination:  Coordination normal.      Deep Tendon Reflexes: Reflexes normal.   Psychiatric:         Mood and Affect: Mood normal.         Behavior: Behavior normal.         Assessment:       1. Adjustment reaction with anxiety and depression    2. Primary insomnia        Plan:       Bev was seen today for follow-up.    Diagnoses and all orders for this visit:    Adjustment reaction with anxiety and depression    Primary insomnia  -     eszopiclone (LUNESTA) 2 MG Tab; Take 1 tablet (2 mg total) by mouth every evening.      During this visit, I reviewed the pt's history, medications, allergies, and problem list.

## 2023-12-11 ENCOUNTER — LAB VISIT (OUTPATIENT)
Dept: LAB | Facility: HOSPITAL | Age: 73
End: 2023-12-11
Attending: FAMILY MEDICINE
Payer: MEDICARE

## 2023-12-11 DIAGNOSIS — I70.0 THORACIC AORTIC ATHEROSCLEROSIS: ICD-10-CM

## 2023-12-11 DIAGNOSIS — R53.83 FATIGUE, UNSPECIFIED TYPE: ICD-10-CM

## 2023-12-11 DIAGNOSIS — I77.9 MILD CAROTID ARTERY DISEASE: ICD-10-CM

## 2023-12-11 DIAGNOSIS — Z00.00 ENCOUNTER FOR PREVENTIVE HEALTH EXAMINATION: ICD-10-CM

## 2023-12-11 LAB
ALBUMIN SERPL BCP-MCNC: 3.8 G/DL (ref 3.5–5.2)
ALP SERPL-CCNC: 83 U/L (ref 55–135)
ALT SERPL W/O P-5'-P-CCNC: 19 U/L (ref 10–44)
ANION GAP SERPL CALC-SCNC: 12 MMOL/L (ref 8–16)
AST SERPL-CCNC: 27 U/L (ref 10–40)
BASOPHILS # BLD AUTO: 0.03 K/UL (ref 0–0.2)
BASOPHILS NFR BLD: 0.4 % (ref 0–1.9)
BILIRUB SERPL-MCNC: 0.8 MG/DL (ref 0.1–1)
BUN SERPL-MCNC: 14 MG/DL (ref 8–23)
CALCIUM SERPL-MCNC: 9.1 MG/DL (ref 8.7–10.5)
CHLORIDE SERPL-SCNC: 104 MMOL/L (ref 95–110)
CHOLEST SERPL-MCNC: 177 MG/DL (ref 120–199)
CHOLEST/HDLC SERPL: 1.9 {RATIO} (ref 2–5)
CO2 SERPL-SCNC: 23 MMOL/L (ref 23–29)
CREAT SERPL-MCNC: 0.7 MG/DL (ref 0.5–1.4)
DIFFERENTIAL METHOD: ABNORMAL
EOSINOPHIL # BLD AUTO: 0.2 K/UL (ref 0–0.5)
EOSINOPHIL NFR BLD: 2.2 % (ref 0–8)
ERYTHROCYTE [DISTWIDTH] IN BLOOD BY AUTOMATED COUNT: 13.8 % (ref 11.5–14.5)
EST. GFR  (NO RACE VARIABLE): >60 ML/MIN/1.73 M^2
GLUCOSE SERPL-MCNC: 99 MG/DL (ref 70–110)
HCT VFR BLD AUTO: 40.4 % (ref 37–48.5)
HDLC SERPL-MCNC: 94 MG/DL (ref 40–75)
HDLC SERPL: 53.1 % (ref 20–50)
HGB BLD-MCNC: 13.3 G/DL (ref 12–16)
IMM GRANULOCYTES # BLD AUTO: 0.03 K/UL (ref 0–0.04)
IMM GRANULOCYTES NFR BLD AUTO: 0.4 % (ref 0–0.5)
LDLC SERPL CALC-MCNC: 74.6 MG/DL (ref 63–159)
LYMPHOCYTES # BLD AUTO: 3.1 K/UL (ref 1–4.8)
LYMPHOCYTES NFR BLD: 44 % (ref 18–48)
MCH RBC QN AUTO: 33.2 PG (ref 27–31)
MCHC RBC AUTO-ENTMCNC: 32.9 G/DL (ref 32–36)
MCV RBC AUTO: 101 FL (ref 82–98)
MONOCYTES # BLD AUTO: 0.4 K/UL (ref 0.3–1)
MONOCYTES NFR BLD: 5.6 % (ref 4–15)
NEUTROPHILS # BLD AUTO: 3.4 K/UL (ref 1.8–7.7)
NEUTROPHILS NFR BLD: 47.4 % (ref 38–73)
NONHDLC SERPL-MCNC: 83 MG/DL
NRBC BLD-RTO: 0 /100 WBC
PLATELET # BLD AUTO: 260 K/UL (ref 150–450)
PMV BLD AUTO: 11.3 FL (ref 9.2–12.9)
POTASSIUM SERPL-SCNC: 4.5 MMOL/L (ref 3.5–5.1)
PROT SERPL-MCNC: 6.6 G/DL (ref 6–8.4)
RBC # BLD AUTO: 4.01 M/UL (ref 4–5.4)
SODIUM SERPL-SCNC: 139 MMOL/L (ref 136–145)
T4 FREE SERPL-MCNC: 0.87 NG/DL (ref 0.71–1.51)
TRIGL SERPL-MCNC: 42 MG/DL (ref 30–150)
TSH SERPL DL<=0.005 MIU/L-ACNC: 1.78 UIU/ML (ref 0.4–4)
WBC # BLD AUTO: 7.14 K/UL (ref 3.9–12.7)

## 2023-12-11 PROCEDURE — 85025 COMPLETE CBC W/AUTO DIFF WBC: CPT | Performed by: NURSE PRACTITIONER

## 2023-12-11 PROCEDURE — 36415 COLL VENOUS BLD VENIPUNCTURE: CPT | Mod: PO | Performed by: FAMILY MEDICINE

## 2023-12-11 PROCEDURE — 84443 ASSAY THYROID STIM HORMONE: CPT | Performed by: FAMILY MEDICINE

## 2023-12-11 PROCEDURE — 80053 COMPREHEN METABOLIC PANEL: CPT | Performed by: NURSE PRACTITIONER

## 2023-12-11 PROCEDURE — 84439 ASSAY OF FREE THYROXINE: CPT | Performed by: FAMILY MEDICINE

## 2023-12-11 PROCEDURE — 80061 LIPID PANEL: CPT | Performed by: NURSE PRACTITIONER

## 2024-01-02 ENCOUNTER — TELEPHONE (OUTPATIENT)
Dept: CARDIOLOGY | Facility: CLINIC | Age: 74
End: 2024-01-02
Payer: MEDICARE

## 2024-01-02 NOTE — TELEPHONE ENCOUNTER
----- Message from Sisi Regan sent at 1/2/2024 10:23 AM CST -----  Contact: pt  Type:  Sooner Apoointment Request    Caller is requesting a sooner appointment.  Caller declined first available appointment listed below.  Caller will not accept being placed on the waitlist and is requesting a message be sent to doctor.  Name of Caller:pt    When is the first available appointment? Need reschedule of 1/5 appt    Symptoms:med check    Would the patient rather a call back or a response via [facility]? Call back    Best Call Back Number:[phone]    Additional Information: is in the process of changing insurance plans and needs to reschedule her appt if poss.    Please call to advise  Thanks

## 2024-01-05 ENCOUNTER — OFFICE VISIT (OUTPATIENT)
Dept: CARDIOLOGY | Facility: CLINIC | Age: 74
End: 2024-01-05
Payer: MEDICARE

## 2024-01-05 VITALS
HEIGHT: 67 IN | BODY MASS INDEX: 24.01 KG/M2 | DIASTOLIC BLOOD PRESSURE: 74 MMHG | SYSTOLIC BLOOD PRESSURE: 139 MMHG | HEART RATE: 81 BPM | WEIGHT: 153 LBS

## 2024-01-05 DIAGNOSIS — I77.9 MILD CAROTID ARTERY DISEASE: Chronic | ICD-10-CM

## 2024-01-05 DIAGNOSIS — E78.00 HYPERCHOLESTEROLEMIA: Chronic | ICD-10-CM

## 2024-01-05 DIAGNOSIS — I70.0 THORACIC AORTIC ATHEROSCLEROSIS: Chronic | ICD-10-CM

## 2024-01-05 DIAGNOSIS — I34.0 NON-RHEUMATIC MITRAL REGURGITATION: Primary | Chronic | ICD-10-CM

## 2024-01-05 DIAGNOSIS — I73.00 RAYNAUD'S DISEASE WITHOUT GANGRENE: Chronic | ICD-10-CM

## 2024-01-05 DIAGNOSIS — Z72.0 TOBACCO USE: Chronic | ICD-10-CM

## 2024-01-05 DIAGNOSIS — I10 ESSENTIAL HYPERTENSION: Chronic | ICD-10-CM

## 2024-01-05 PROCEDURE — 1159F MED LIST DOCD IN RCRD: CPT | Mod: CPTII,S$GLB,, | Performed by: INTERNAL MEDICINE

## 2024-01-05 PROCEDURE — 3078F DIAST BP <80 MM HG: CPT | Mod: CPTII,S$GLB,, | Performed by: INTERNAL MEDICINE

## 2024-01-05 PROCEDURE — 3288F FALL RISK ASSESSMENT DOCD: CPT | Mod: CPTII,S$GLB,, | Performed by: INTERNAL MEDICINE

## 2024-01-05 PROCEDURE — 3008F BODY MASS INDEX DOCD: CPT | Mod: CPTII,S$GLB,, | Performed by: INTERNAL MEDICINE

## 2024-01-05 PROCEDURE — 99214 OFFICE O/P EST MOD 30 MIN: CPT | Mod: S$GLB,,, | Performed by: INTERNAL MEDICINE

## 2024-01-05 PROCEDURE — 99999 PR PBB SHADOW E&M-EST. PATIENT-LVL II: CPT | Mod: PBBFAC,,, | Performed by: INTERNAL MEDICINE

## 2024-01-05 PROCEDURE — 1101F PT FALLS ASSESS-DOCD LE1/YR: CPT | Mod: CPTII,S$GLB,, | Performed by: INTERNAL MEDICINE

## 2024-01-05 PROCEDURE — 3075F SYST BP GE 130 - 139MM HG: CPT | Mod: CPTII,S$GLB,, | Performed by: INTERNAL MEDICINE

## 2024-01-05 PROCEDURE — 1126F AMNT PAIN NOTED NONE PRSNT: CPT | Mod: CPTII,S$GLB,, | Performed by: INTERNAL MEDICINE

## 2024-01-05 RX ORDER — PRAVASTATIN SODIUM 10 MG/1
10 TABLET ORAL DAILY
Qty: 90 TABLET | Refills: 2 | Status: SHIPPED | OUTPATIENT
Start: 2024-01-05 | End: 2025-01-04

## 2024-01-05 NOTE — PROGRESS NOTES
Subjective:    Patient ID:  Bev Miguel is a 73 y.o. female who presents for Heart Problem and Risk Factor Management For Atherosclerosis        HPI  RECENT LABS CMP OK , LDL 74, HDL 94, STILL SMOKES, DIVORCE FINALIZED IN DECEMBER, STRUGGELING, WITH IT, STABLE RAYNAUD SYMPTOMS, SEE ROS    Past Medical History:   Diagnosis Date    Abnormal EKG     Allergy     perennial; sees Dr Jose ENT for injections    Asthma     mild interm    Breast CA     Cancer     ovarian    Chest pain     Chronic insomnia     Colonic polyp     Heart murmur     Hyperlipidemia     Hypoglycemia     Macrocytosis     Mild carotid artery disease 4/29/2019    Mitral valve regurgitation     Osteopenia     Ovarian cancer     SOB (shortness of breath)     Uterine cancer     Valvular regurgitation     Varicose vein of leg     Venous insufficiency     Vitamin C deficiency      Past Surgical History:   Procedure Laterality Date    BREAST SURGERY  05/28/2010    bilateral mastectomy/breast reconstruction; R breast ca.; no XRT/chemo. Femara for 5 yrs.    hip replacement Right 2021    OOPHORECTOMY      sinuplasty      TC  02/20/2017    no polyps; GI/Dr March. 5 yr repeat.    TONSILLECTOMY      URETEROTOMY       Family History   Problem Relation Age of Onset    Early death Mother     Miscarriages / Stillbirths Mother     Cancer Father     Early death Father     Asthma Brother     Early death Brother      Social History     Socioeconomic History    Marital status:     Number of children: 2   Occupational History    Occupation: retired sales management   Tobacco Use    Smoking status: Every Day     Current packs/day: 0.25     Average packs/day: 0.3 packs/day for 45.0 years (11.3 ttl pk-yrs)     Types: Cigarettes    Smokeless tobacco: Never   Substance and Sexual Activity    Alcohol use: Yes     Alcohol/week: 2.0 standard drinks of alcohol     Types: 2 Shots of liquor per week     Comment: 2 cocktails daily; wine 5 glasses a week.    Drug  use: No     Social Determinants of Health     Financial Resource Strain: Low Risk  (10/10/2023)    Overall Financial Resource Strain (CARDIA)     Difficulty of Paying Living Expenses: Not very hard   Food Insecurity: No Food Insecurity (10/10/2023)    Hunger Vital Sign     Worried About Running Out of Food in the Last Year: Never true     Ran Out of Food in the Last Year: Never true   Transportation Needs: No Transportation Needs (10/10/2023)    PRAPARE - Transportation     Lack of Transportation (Medical): No     Lack of Transportation (Non-Medical): No   Physical Activity: Insufficiently Active (10/10/2023)    Exercise Vital Sign     Days of Exercise per Week: 3 days     Minutes of Exercise per Session: 30 min   Stress: Stress Concern Present (10/10/2023)    [employer] - Occupational Stress Questionnaire     Feeling of Stress : To some extent   Social Connections: Socially Isolated (10/10/2023)    Social Connection and Isolation Panel [NHANES]     Frequency of Communication with Friends and Family: More than three times a week     Frequency of Social Gatherings with Friends and Family: Once a week     Attends Restorationist Services: Never     Active Member of Clubs or Organizations: No     Attends Club or Organization Meetings: Never     Marital Status:    Housing Stability: Low Risk  (10/10/2023)    Housing Stability Vital Sign     Unable to Pay for Housing in the Last Year: No     Number of Places Lived in the Last Year: 1     Unstable Housing in the Last Year: No       Review of patient's allergies indicates:   Allergen Reactions    Benadryl [diphenhydramine hcl]     Biaxin [clarithromycin]     Iodine and iodide containing products     Levaquin [levofloxacin]     Sulfa (sulfonamide antibiotics)     Trazodone     Zyban [bupropion hcl (smoking deter)]     Bactrim [sulfamethoxazole-trimethoprim] Rash    Gadolinium-containing contrast media Rash       Current Outpatient  Medications:     alendronate (FOSAMAX) 70 MG tablet, Take 1 tablet (70 mg total) by mouth every 7 days., Disp: 12 tablet, Rfl: 1    amLODIPine (NORVASC) 5 MG tablet, Take 1 tablet (5 mg total) by mouth once daily. (Patient taking differently: Take 10 mg by mouth once daily.), Disp: 90 tablet, Rfl: 2    ascorbic acid, vitamin C, (VITAMIN C) 1000 MG tablet, Take 1,000 mg by mouth once daily., Disp: , Rfl:     aspirin (ECOTRIN) 81 MG EC tablet, Take 81 mg by mouth once daily., Disp: , Rfl:     calcium carbonate (OS-ALEK) 600 mg calcium (1,500 mg) Tab, Take 600 mg by mouth once., Disp: , Rfl:     cyanocobalamin (VITAMIN B-12) 1000 MCG tablet, Take 3,000 mcg by mouth once daily., Disp: , Rfl:     EScitalopram oxalate (LEXAPRO) 10 MG tablet, Take 1 tablet (10 mg total) by mouth once daily., Disp: 90 tablet, Rfl: 3    eszopiclone (LUNESTA) 2 MG Tab, Take 1 tablet (2 mg total) by mouth every evening., Disp: 90 tablet, Rfl: 1    ibuprofen (ADVIL,MOTRIN) 200 MG tablet, Take 200 mg by mouth every 6 (six) hours as needed for Pain., Disp: , Rfl:     Lactobacillus rhamnosus GG (CULTURELLE) 10 billion cell capsule, Take 1 capsule by mouth once daily., Disp: , Rfl:     MULTIVIT-MIN/IRON/FOLIC/LUTEIN (CENTRUM SILVER WOMEN ORAL), Take 1 capsule by mouth once daily., Disp: , Rfl:     tumeric-ging-olive-oreg-capryl 100 mg-150 mg- 50 mg-150 mg Cap, Take by mouth., Disp: , Rfl:     vitamin D 1000 units Tab, Take 2,000 mg by mouth 2 (two) times a day., Disp: , Rfl:     vitamin E 400 UNIT capsule, Take 1,000 Units by mouth once daily. , Disp: , Rfl:     nicotine (NICODERM CQ) 7 mg/24 hr, Place 1 patch onto the skin once daily. (Patient not taking: Reported on 1/5/2024), Disp: 30 patch, Rfl: 0    pravastatin (PRAVACHOL) 10 MG tablet, Take 1 tablet (10 mg total) by mouth once daily., Disp: 90 tablet, Rfl: 2    Review of Systems   Constitutional: Negative for chills, diaphoresis, fever, malaise/fatigue and night sweats.   HENT:  Negative for  "congestion and sore throat.    Eyes:  Negative for blurred vision and visual disturbance.   Cardiovascular:  Negative for chest pain, claudication, cyanosis, dyspnea on exertion, irregular heartbeat, leg swelling, near-syncope, orthopnea, palpitations, paroxysmal nocturnal dyspnea and syncope.        STABLE RAYNAUD'S   Respiratory:  Negative for cough, hemoptysis, shortness of breath and wheezing.    Endocrine: Negative for polyphagia and polyuria.   Hematologic/Lymphatic: Negative for adenopathy. Does not bruise/bleed easily.   Skin:  Negative for color change (OCC) and rash.   Musculoskeletal:  Negative for back pain, falls and muscle weakness.   Gastrointestinal:  Negative for abdominal pain, dysphagia, jaundice, melena and nausea.   Genitourinary:  Negative for dysuria and flank pain.   Neurological:  Negative for focal weakness, headaches, light-headedness, loss of balance, numbness and weakness.   Psychiatric/Behavioral:  Negative for altered mental status and depression.    Allergic/Immunologic: Negative for hives and persistent infections.        Objective:      Vitals:    01/05/24 1010   BP: 139/74   Pulse: 81   Weight: 69.4 kg (153 lb)   Height: 5' 7" (1.702 m)   PainSc: 0-No pain     Body mass index is 23.96 kg/m².    Physical Exam  Constitutional:       Appearance: Normal appearance. She is well-developed.   HENT:      Head: Normocephalic and atraumatic.   Eyes:      General: No scleral icterus.     Extraocular Movements: Extraocular movements intact.      Right eye: Normal extraocular motion.      Pupils: Pupils are equal, round, and reactive to light.   Neck:      Vascular: Normal carotid pulses. No hepatojugular reflux or JVD.   Cardiovascular:      Rate and Rhythm: Normal rate and regular rhythm. No extrasystoles are present.     Pulses:           Carotid pulses are 2+ on the right side and 2+ on the left side.       Radial pulses are 2+ on the right side and 2+ on the left side.        Posterior " tibial pulses are 2+ on the right side and 2+ on the left side.      Heart sounds: Murmur heard.      Systolic murmur is present with a grade of 1/6 at the lower left sternal border.      No friction rub. No gallop. No S4 sounds.   Pulmonary:      Effort: Pulmonary effort is normal. No respiratory distress.      Breath sounds: Normal breath sounds and air entry. No rales.   Abdominal:      Palpations: Abdomen is soft. There is no hepatomegaly.      Tenderness: There is no abdominal tenderness.   Musculoskeletal:      Cervical back: Neck supple.      Right lower leg: No edema.      Left lower leg: No edema.   Skin:     General: Skin is warm and dry.      Capillary Refill: Capillary refill takes 2 to 3 seconds.      Comments:     Neurological:      General: No focal deficit present.      Mental Status: She is alert and oriented to person, place, and time.      Cranial Nerves: Cranial nerves 2-12 are intact.   Psychiatric:         Mood and Affect: Mood normal.         Speech: Speech normal.         Behavior: Behavior normal.                 ..    Chemistry        Component Value Date/Time     12/11/2023 0810    K 4.5 12/11/2023 0810     12/11/2023 0810    CO2 23 12/11/2023 0810    BUN 14 12/11/2023 0810    CREATININE 0.7 12/11/2023 0810    GLU 99 12/11/2023 0810        Component Value Date/Time    CALCIUM 9.1 12/11/2023 0810    ALKPHOS 83 12/11/2023 0810    AST 27 12/11/2023 0810    AST 27 03/14/2016 1523    ALT 19 12/11/2023 0810    BILITOT 0.8 12/11/2023 0810    ESTGFRAFRICA >60.0 12/06/2021 0730    ESTGFRAFRICA >60.0 12/06/2021 0730    EGFRNONAA >60.0 12/06/2021 0730    EGFRNONAA >60.0 12/06/2021 0730            ..  Lab Results   Component Value Date    CHOL 177 12/11/2023    CHOL 191 02/22/2023    CHOL 198 12/06/2021     Lab Results   Component Value Date    HDL 94 (H) 12/11/2023    HDL 91 (H) 02/22/2023    HDL 88 (H) 12/06/2021     Lab Results   Component Value Date    LDLCALC 74.6 12/11/2023     LDLCALC 83.6 02/22/2023    LDLCALC 98.0 12/06/2021     Lab Results   Component Value Date    TRIG 42 12/11/2023    TRIG 82 02/22/2023    TRIG 60 12/06/2021     Lab Results   Component Value Date    CHOLHDL 53.1 (H) 12/11/2023    CHOLHDL 47.6 02/22/2023    CHOLHDL 44.4 12/06/2021     ..  Lab Results   Component Value Date    WBC 7.14 12/11/2023    HGB 13.3 12/11/2023    HCT 40.4 12/11/2023     (H) 12/11/2023     12/11/2023       Test(s) Reviewed  I have reviewed the following in detail:  [] Stress test   [] Angiography   [] Echocardiogram   [x] Labs   [] Other:       Assessment:         ICD-10-CM ICD-9-CM   1. Non-rheumatic mitral regurgitation  I34.0 424.0   2. Thoracic aortic atherosclerosis  I70.0 440.0   3. Mild carotid artery disease  I77.9 447.9   4. Essential hypertension  I10 401.9   5. Raynaud's disease without gangrene  I73.00 443.0   6. Tobacco use  Z72.0 305.1   7. Hypercholesterolemia  E78.00 272.0     Problem List Items Addressed This Visit          Cardiac/Vascular    Raynaud's disease without gangrene    Non-rheumatic mitral regurgitation - Primary    Hypercholesterolemia    Essential hypertension    Mild carotid artery disease    Thoracic aortic atherosclerosis    Overview     cxr 2019            Other    Tobacco use        Plan:     TOBACCO CESSATION, COUNSELING AGO AVOID SUDDEN EXPOSURE TO, COLD, OR CHANGE IN TEMPERATURE, MONITOR BLOOD PRESSURE,ALL CV CLINICALLY STABLE, NO ANGINA, NO HF, NO TIA, NO CLINICAL ARRHYTHMIA,CONTINUE CURRENT MEDS, EDUCATION, DIET, EXERCISE RETURN TO CLINIC IN 9 MO      Non-rheumatic mitral regurgitation    Thoracic aortic atherosclerosis  Comments:  NO EMBOLIZATION    Mild carotid artery disease    Essential hypertension  Comments:  CONTROLLED    Raynaud's disease without gangrene    Tobacco use  Comments:  COUNSELING    Hypercholesterolemia    Other orders  -     pravastatin (PRAVACHOL) 10 MG tablet; Take 1 tablet (10 mg total) by mouth once daily.   Dispense: 90 tablet; Refill: 2    RTC Low level/low impact aerobic exercise 5x's/wk. Heart healthy diet and risk factor modification.    See labs and med orders.    Aerobic exercise 5x's/wk. Heart healthy diet and risk factor modification.    See labs and med orders.

## 2024-02-28 ENCOUNTER — OFFICE VISIT (OUTPATIENT)
Dept: FAMILY MEDICINE | Facility: CLINIC | Age: 74
End: 2024-02-28
Payer: MEDICARE

## 2024-02-28 VITALS
BODY MASS INDEX: 23.6 KG/M2 | WEIGHT: 150.38 LBS | SYSTOLIC BLOOD PRESSURE: 128 MMHG | HEIGHT: 67 IN | HEART RATE: 60 BPM | DIASTOLIC BLOOD PRESSURE: 64 MMHG | OXYGEN SATURATION: 97 %

## 2024-02-28 DIAGNOSIS — F51.01 PRIMARY INSOMNIA: ICD-10-CM

## 2024-02-28 DIAGNOSIS — J06.9 VIRAL UPPER RESPIRATORY TRACT INFECTION: Primary | ICD-10-CM

## 2024-02-28 DIAGNOSIS — F43.23 ADJUSTMENT REACTION WITH ANXIETY AND DEPRESSION: ICD-10-CM

## 2024-02-28 PROCEDURE — 99213 OFFICE O/P EST LOW 20 MIN: CPT | Mod: S$GLB,,, | Performed by: FAMILY MEDICINE

## 2024-02-28 PROCEDURE — 3008F BODY MASS INDEX DOCD: CPT | Mod: CPTII,S$GLB,, | Performed by: FAMILY MEDICINE

## 2024-02-28 PROCEDURE — 3288F FALL RISK ASSESSMENT DOCD: CPT | Mod: CPTII,S$GLB,, | Performed by: FAMILY MEDICINE

## 2024-02-28 PROCEDURE — 99999 PR PBB SHADOW E&M-EST. PATIENT-LVL IV: CPT | Mod: PBBFAC,,, | Performed by: FAMILY MEDICINE

## 2024-02-28 PROCEDURE — 1126F AMNT PAIN NOTED NONE PRSNT: CPT | Mod: CPTII,S$GLB,, | Performed by: FAMILY MEDICINE

## 2024-02-28 PROCEDURE — 3074F SYST BP LT 130 MM HG: CPT | Mod: CPTII,S$GLB,, | Performed by: FAMILY MEDICINE

## 2024-02-28 PROCEDURE — 1101F PT FALLS ASSESS-DOCD LE1/YR: CPT | Mod: CPTII,S$GLB,, | Performed by: FAMILY MEDICINE

## 2024-02-28 PROCEDURE — 1159F MED LIST DOCD IN RCRD: CPT | Mod: CPTII,S$GLB,, | Performed by: FAMILY MEDICINE

## 2024-02-28 PROCEDURE — 3078F DIAST BP <80 MM HG: CPT | Mod: CPTII,S$GLB,, | Performed by: FAMILY MEDICINE

## 2024-02-28 RX ORDER — METHYLPREDNISOLONE 4 MG/1
TABLET ORAL
Qty: 1 EACH | Refills: 0 | Status: SHIPPED | OUTPATIENT
Start: 2024-02-28 | End: 2024-03-20

## 2024-02-28 NOTE — PROGRESS NOTES
Subjective:       Patient ID: Bev Miguel is a 73 y.o. female.    Chief Complaint: Other (F/u discuss medications ) and Fatigue    Pt is known to me.   The pt reports 10 days of runny nose, sinus congestion, mild SOB (not new), night time cough.  She feels like she wants to sleep all the time.  She did a home COVID test that was negative.  She has used saline NS, Allegra and Tylenol (she has had a low grade fever--her last fever was 3 days ago).  The pt is doing well with taking a half of a 10 mg Lexapro.  She is sleeping sell with the Lunesta.      Review of Systems    Objective:      Physical Exam  Vitals and nursing note reviewed.   Constitutional:       General: She is not in acute distress.     Appearance: Normal appearance. She is not ill-appearing.   HENT:      Nose: Congestion and rhinorrhea present.      Mouth/Throat:      Mouth: Mucous membranes are moist.      Pharynx: Oropharynx is clear.      Comments: Post nasal drainage  Cardiovascular:      Rate and Rhythm: Normal rate and regular rhythm.      Heart sounds: Normal heart sounds.   Pulmonary:      Effort: Pulmonary effort is normal.      Breath sounds: Normal breath sounds.   Lymphadenopathy:      Cervical: No cervical adenopathy.   Skin:     General: Skin is warm and dry.   Neurological:      Mental Status: She is alert and oriented to person, place, and time.   Psychiatric:         Mood and Affect: Mood normal.         Behavior: Behavior normal.         Assessment:       1. Viral upper respiratory tract infection    2. Adjustment reaction with anxiety and depression    3. Primary insomnia        Plan:       Bev was seen today for other and fatigue.    Diagnoses and all orders for this visit:    Viral upper respiratory tract infection  -     methylPREDNISolone (MEDROL DOSEPACK) 4 mg tablet; use as directed    Adjustment reaction with anxiety and depression  Comments:  Continue current meds    Primary insomnia  Comments:  Continue  Lunesta      During this visit, I reviewed the pt's history, medications, allergies, and problem list.

## 2024-03-19 ENCOUNTER — TUMOR BOARD CONFERENCE (OUTPATIENT)
Dept: HEMATOLOGY/ONCOLOGY | Facility: CLINIC | Age: 74
End: 2024-03-19
Payer: MEDICARE

## 2024-03-20 NOTE — PROGRESS NOTES
[facility]      THORACIC MULTIDISCIPLINARY TUMOR BOARD  PATIENT REVIEW FORM  ___________________________________________________________________    CLINIC #: [id number]  DATE: 03/19/2024    TUMOR SITE:   RUL Nodule    Presenting Hospital / Clinic: [facility]     Virtual Tumor Board Conference: In person       PRESENTER:   Presenter: Dr. Gomez     Specialties Present: Medical Oncology; Hematology; Radiation Oncology; Surgical Oncology; Pathology; Navigation; Integrative Oncology; Radiology; Pulmonology       PATIENT SUMMARY:   Patient is a 73 y.o. female presents for follow-up of lung nodule. This was originally diagnosed last year and she had IR biopsy showing adenomatous hyperplasia but no malignancy. Nodule was stable at f/u and new CT scan shows possible slight growth. She denies weight loss.   CT reviewed as above. Previous bx with atypical adenomatous hyperplasia and now questionable growth. Patient high risk due to smoking. Nodule is suspicious. Will discuss at tumor board at [facility] tomorrow. If there has been growth then re-biopsy may be needed. If not, will at least monitor at 6-month interval.     See labs and med orders.    Background Information  Patient Status: a current patient  Reason for Consultation: -- (Discuss with radiology whether there is significant change or not then decide on Robotic bronch vs. surveillance)         BOARD RECOMMENDATIONS:   --Plan to follow up with CT Chest in 3 months          [x] Sara'l Treatment Guidelines reviewed and care planned is consistent with guidelines.         (i.e., NCCN, NCI, PD, ACO, AUA, etc.)    PRESENTATION AT CANCER CONFERENCE:   Presentation at Cancer Conference: Prospective       CLINICAL TRIAL ELIGIBILITY:   No data recorded     Mary Talbert

## 2024-04-24 ENCOUNTER — OFFICE VISIT (OUTPATIENT)
Dept: OBSTETRICS AND GYNECOLOGY | Facility: CLINIC | Age: 74
End: 2024-04-24
Payer: MEDICARE

## 2024-04-24 VITALS
SYSTOLIC BLOOD PRESSURE: 142 MMHG | BODY MASS INDEX: 24.31 KG/M2 | DIASTOLIC BLOOD PRESSURE: 98 MMHG | WEIGHT: 155.19 LBS

## 2024-04-24 DIAGNOSIS — N95.2 VAGINAL ATROPHY: Primary | ICD-10-CM

## 2024-04-24 PROCEDURE — 3080F DIAST BP >= 90 MM HG: CPT | Mod: CPTII,S$GLB,, | Performed by: STUDENT IN AN ORGANIZED HEALTH CARE EDUCATION/TRAINING PROGRAM

## 2024-04-24 PROCEDURE — 99213 OFFICE O/P EST LOW 20 MIN: CPT | Mod: S$GLB,,, | Performed by: STUDENT IN AN ORGANIZED HEALTH CARE EDUCATION/TRAINING PROGRAM

## 2024-04-24 PROCEDURE — 1101F PT FALLS ASSESS-DOCD LE1/YR: CPT | Mod: CPTII,S$GLB,, | Performed by: STUDENT IN AN ORGANIZED HEALTH CARE EDUCATION/TRAINING PROGRAM

## 2024-04-24 PROCEDURE — 3288F FALL RISK ASSESSMENT DOCD: CPT | Mod: CPTII,S$GLB,, | Performed by: STUDENT IN AN ORGANIZED HEALTH CARE EDUCATION/TRAINING PROGRAM

## 2024-04-24 PROCEDURE — 99999 PR PBB SHADOW E&M-EST. PATIENT-LVL III: CPT | Mod: PBBFAC,,, | Performed by: STUDENT IN AN ORGANIZED HEALTH CARE EDUCATION/TRAINING PROGRAM

## 2024-04-24 PROCEDURE — 3008F BODY MASS INDEX DOCD: CPT | Mod: CPTII,S$GLB,, | Performed by: STUDENT IN AN ORGANIZED HEALTH CARE EDUCATION/TRAINING PROGRAM

## 2024-04-24 PROCEDURE — 1126F AMNT PAIN NOTED NONE PRSNT: CPT | Mod: CPTII,S$GLB,, | Performed by: STUDENT IN AN ORGANIZED HEALTH CARE EDUCATION/TRAINING PROGRAM

## 2024-04-24 PROCEDURE — 1159F MED LIST DOCD IN RCRD: CPT | Mod: CPTII,S$GLB,, | Performed by: STUDENT IN AN ORGANIZED HEALTH CARE EDUCATION/TRAINING PROGRAM

## 2024-04-24 PROCEDURE — 3077F SYST BP >= 140 MM HG: CPT | Mod: CPTII,S$GLB,, | Performed by: STUDENT IN AN ORGANIZED HEALTH CARE EDUCATION/TRAINING PROGRAM

## 2024-04-24 RX ORDER — PRASTERONE 6.5 MG/1
1 INSERT VAGINAL NIGHTLY
Qty: 28 EACH | Refills: 11 | Status: SHIPPED | OUTPATIENT
Start: 2024-04-24

## 2024-04-24 NOTE — PROGRESS NOTES
History & Physical  Gynecology      SUBJECTIVE:     Chief Complaint: Annual Exam       History of Present Illness:    73 y.o. here today for well woman exam. Getting  from  of 31 years, been stressful, after 16 months finally done. Lexapro is helping.     Gyn: no PMB. Hx of hyst/USO in 20's for endometrial/ovarian cancer? Left one ovary behind? No hx of abnormal pap smear prior to hyst. Breast cancer survivor, double mastectomy. Neg BRCA per patient.     No tobacco, etoh or other drugs        Review of patient's allergies indicates:   Allergen Reactions    Benadryl [diphenhydramine hcl]     Biaxin [clarithromycin]     Iodine and iodide containing products     Levaquin [levofloxacin]     Sulfa (sulfonamide antibiotics)     Trazodone     Zyban [bupropion hcl (smoking deter)]     Bactrim [sulfamethoxazole-trimethoprim] Rash    Gadolinium-containing contrast media Rash       Past Medical History:   Diagnosis Date    Abnormal EKG     Allergy     perennial; sees Dr Jose ENT for injections    Asthma     mild interm    Breast CA     Cancer     ovarian    Chest pain     Chronic insomnia     Colonic polyp     Heart murmur     Hyperlipidemia     Hypoglycemia     Macrocytosis     Mild carotid artery disease 2019    Mitral valve regurgitation     Osteopenia     Ovarian cancer     SOB (shortness of breath)     Uterine cancer     Valvular regurgitation     Varicose vein of leg     Venous insufficiency     Vitamin C deficiency      Past Surgical History:   Procedure Laterality Date    BREAST SURGERY  2010    bilateral mastectomy/breast reconstruction; R breast ca.; no XRT/chemo. Femara for 5 yrs.    hip replacement Right     OOPHORECTOMY      sinuplasty      TC  2017    no polyps; GI/Dr March. 5 yr repeat.    TONSILLECTOMY      URETEROTOMY       OB History          1    Para   1    Term   1            AB        Living             SAB        IAB        Ectopic        Multiple         Live Births                   Family History   Problem Relation Name Age of Onset    Cancer Father      Early death Father      Early death Mother      Miscarriages / Stillbirths Mother      Asthma Brother      Early death Brother      Colon cancer Neg Hx       Social History     Tobacco Use    Smoking status: Every Day     Current packs/day: 0.25     Average packs/day: 0.3 packs/day for 45.0 years (11.3 ttl pk-yrs)     Types: Cigarettes    Smokeless tobacco: Never   Substance Use Topics    Alcohol use: Yes     Alcohol/week: 2.0 standard drinks of alcohol     Types: 2 Shots of liquor per week     Comment: 2 cocktails daily; wine 5 glasses a week.    Drug use: No       Current Outpatient Medications   Medication Sig Dispense Refill    alendronate (FOSAMAX) 70 MG tablet Take 1 tablet (70 mg total) by mouth every 7 days. 12 tablet 1    amLODIPine (NORVASC) 5 MG tablet Take 1 tablet (5 mg total) by mouth once daily. (Patient taking differently: Take 10 mg by mouth once daily.) 90 tablet 2    ascorbic acid, vitamin C, (VITAMIN C) 1000 MG tablet Take 1,000 mg by mouth once daily.      aspirin (ECOTRIN) 81 MG EC tablet Take 81 mg by mouth once daily.      calcium carbonate (OS-ALEK) 600 mg calcium (1,500 mg) Tab Take 600 mg by mouth once.      cyanocobalamin (VITAMIN B-12) 1000 MCG tablet Take 3,000 mcg by mouth once daily.      EScitalopram oxalate (LEXAPRO) 10 MG tablet Take 1 tablet (10 mg total) by mouth once daily. 90 tablet 3    eszopiclone (LUNESTA) 2 MG Tab Take 1 tablet (2 mg total) by mouth every evening. 90 tablet 1    ibuprofen (ADVIL,MOTRIN) 200 MG tablet Take 200 mg by mouth every 6 (six) hours as needed for Pain.      Lactobacillus rhamnosus GG (CULTURELLE) 10 billion cell capsule Take 1 capsule by mouth once daily.      MULTIVIT-MIN/IRON/FOLIC/LUTEIN (CENTRUM SILVER WOMEN ORAL) Take 1 capsule by mouth once daily.      nicotine (NICODERM CQ) 7 mg/24 hr Place 1 patch onto the skin once daily. (Patient  not taking: Reported on 3/18/2024) 30 patch 0    prasterone, dhea, (INTRAROSA) 6.5 mg Inst Place 1 each vaginally every evening. 28 each 11    pravastatin (PRAVACHOL) 10 MG tablet Take 1 tablet (10 mg total) by mouth once daily. 90 tablet 2    tumeric-ging-olive-oreg-capryl 100 mg-150 mg- 50 mg-150 mg Cap Take by mouth.      vitamin D 1000 units Tab Take 2,000 mg by mouth 2 (two) times a day.      vitamin E 400 UNIT capsule Take 1,000 Units by mouth once daily.        No current facility-administered medications for this visit.         Review of Systems:  Review of Systems   Constitutional:  Negative for chills, fatigue and fever.   HENT:  Negative for congestion.    Eyes:  Negative for visual disturbance.   Respiratory:  Negative for cough and shortness of breath.    Cardiovascular:  Negative for chest pain and palpitations.   Gastrointestinal:  Negative for abdominal distention, abdominal pain, constipation, diarrhea, nausea and vomiting.   Genitourinary:  Negative for difficulty urinating, dysuria, hematuria, vaginal bleeding and vaginal discharge.   Skin:  Negative for rash.   Neurological:  Negative for dizziness, seizures, light-headedness and headaches.   Hematological:  Does not bruise/bleed easily.   Psychiatric/Behavioral:  Negative for dysphoric mood. The patient is not nervous/anxious.         OBJECTIVE:     Physical Exam:  Physical Exam  Vitals reviewed.   Constitutional:       General: She is not in acute distress.     Appearance: Normal appearance. She is well-developed.   HENT:      Head: Normocephalic and atraumatic.   Cardiovascular:      Rate and Rhythm: Normal rate.   Pulmonary:      Effort: Pulmonary effort is normal.   Abdominal:      General: There is no distension.      Palpations: Abdomen is soft.   Genitourinary:     Vagina: Normal.      Comments: Normal external female genitalia, normal hair distribution. Vaginal mucosa pink, moist, well rugated, scant white physiologic discharge. No blood  in vault. No cervix. Vaginal cuff intact. Adnexa without fullness or tenderness.    Skin:     General: Skin is warm.   Neurological:      Mental Status: She is alert and oriented to person, place, and time.   Psychiatric:         Behavior: Behavior normal.         Thought Content: Thought content normal.         Judgment: Judgment normal.           ASSESSMENT:       ICD-10-CM ICD-9-CM    1. Vaginal atrophy  N95.2 627.3 prasterone, dhea, (INTRAROSA) 6.5 mg Inst          No orders of the defined types were placed in this encounter.          Plan:      Well woman:  - Pap n/a unless had cervical cancer  - MMG n/a, s/p mastectomy   - colonoscopy has done with outside provider   - tobacco cessation n/an  - contraception /an  - DEXA 11/2023, on fosfamax  - Counseled to take daily multivitamin. If patient is of reproductive age and not on contraception, to take prenatal vitamin. Patient has been counseled on the vitamin D and calcium requirements per ACOG recommendations.  Age   Calcium(mg/day)   Vitamin D (IU/day)  9-18    1300                       600  19-50  1000                       600  51-70  1200                       600  >70      1,200                      800  Patient to continue supplements  - intrarosa vaginal suppositories for vaginal atrophy, samples given    Lizette Gambino M.D.  Obstetrics and Gynecology

## 2024-05-06 ENCOUNTER — PATIENT MESSAGE (OUTPATIENT)
Dept: OBSTETRICS AND GYNECOLOGY | Facility: CLINIC | Age: 74
End: 2024-05-06
Payer: MEDICARE

## 2024-06-06 ENCOUNTER — OFFICE VISIT (OUTPATIENT)
Dept: FAMILY MEDICINE | Facility: CLINIC | Age: 74
End: 2024-06-06
Payer: MEDICARE

## 2024-06-06 VITALS
DIASTOLIC BLOOD PRESSURE: 72 MMHG | BODY MASS INDEX: 24.65 KG/M2 | SYSTOLIC BLOOD PRESSURE: 130 MMHG | HEART RATE: 89 BPM | OXYGEN SATURATION: 98 % | WEIGHT: 157.44 LBS

## 2024-06-06 DIAGNOSIS — F51.01 PRIMARY INSOMNIA: ICD-10-CM

## 2024-06-06 DIAGNOSIS — I73.00 RAYNAUD'S DISEASE WITHOUT GANGRENE: ICD-10-CM

## 2024-06-06 DIAGNOSIS — F41.9 ANXIETY: ICD-10-CM

## 2024-06-06 DIAGNOSIS — R91.1 PULMONARY NODULE: Primary | ICD-10-CM

## 2024-06-06 DIAGNOSIS — M81.0 OSTEOPOROSIS, UNSPECIFIED OSTEOPOROSIS TYPE, UNSPECIFIED PATHOLOGICAL FRACTURE PRESENCE: ICD-10-CM

## 2024-06-06 PROCEDURE — 3288F FALL RISK ASSESSMENT DOCD: CPT | Mod: CPTII,S$GLB,, | Performed by: FAMILY MEDICINE

## 2024-06-06 PROCEDURE — 1101F PT FALLS ASSESS-DOCD LE1/YR: CPT | Mod: CPTII,S$GLB,, | Performed by: FAMILY MEDICINE

## 2024-06-06 PROCEDURE — 3075F SYST BP GE 130 - 139MM HG: CPT | Mod: CPTII,S$GLB,, | Performed by: FAMILY MEDICINE

## 2024-06-06 PROCEDURE — 3008F BODY MASS INDEX DOCD: CPT | Mod: CPTII,S$GLB,, | Performed by: FAMILY MEDICINE

## 2024-06-06 PROCEDURE — 99999 PR PBB SHADOW E&M-EST. PATIENT-LVL III: CPT | Mod: PBBFAC,,, | Performed by: FAMILY MEDICINE

## 2024-06-06 PROCEDURE — 3078F DIAST BP <80 MM HG: CPT | Mod: CPTII,S$GLB,, | Performed by: FAMILY MEDICINE

## 2024-06-06 PROCEDURE — 99214 OFFICE O/P EST MOD 30 MIN: CPT | Mod: S$GLB,,, | Performed by: FAMILY MEDICINE

## 2024-06-06 PROCEDURE — 1126F AMNT PAIN NOTED NONE PRSNT: CPT | Mod: CPTII,S$GLB,, | Performed by: FAMILY MEDICINE

## 2024-06-06 RX ORDER — AMLODIPINE BESYLATE 5 MG/1
5 TABLET ORAL DAILY
Qty: 90 TABLET | Refills: 3 | Status: SHIPPED | OUTPATIENT
Start: 2024-06-06

## 2024-06-06 RX ORDER — ALENDRONATE SODIUM 70 MG/1
70 TABLET ORAL
Qty: 12 TABLET | Refills: 1 | Status: SHIPPED | OUTPATIENT
Start: 2024-06-06

## 2024-06-06 RX ORDER — ESZOPICLONE 1 MG/1
1 TABLET, FILM COATED ORAL NIGHTLY PRN
Qty: 90 TABLET | Refills: 1 | Status: SHIPPED | OUTPATIENT
Start: 2024-06-06

## 2024-06-06 RX ORDER — ESCITALOPRAM OXALATE 5 MG/1
5 TABLET ORAL DAILY
Qty: 90 TABLET | Refills: 3 | Status: SHIPPED | OUTPATIENT
Start: 2024-06-06 | End: 2025-06-06

## 2024-06-06 NOTE — PROGRESS NOTES
Subjective:       Patient ID: Bev Miguel is a 73 y.o. female.    Chief Complaint: Follow-up    Pt is known to me.   Pt is here for followup of chronic medical issues.  The pt is doing well in general with no acute complaints.  It is time for follow up of a pulmonary nodule.  She is asymptomatic.  She does continue to smoke.  She sees Dr. Gomez at [facility].  Discussed health maintenance with pt and will schedule appropriate studies and/or immunizations.        Follow-up    Review of Systems    Objective:      Physical Exam  Constitutional:       Appearance: She is well-developed.   HENT:      Head: Normocephalic.   Eyes:      Conjunctiva/sclera: Conjunctivae normal.      Pupils: Pupils are equal, round, and reactive to light.   Neck:      Thyroid: No thyromegaly.   Cardiovascular:      Rate and Rhythm: Normal rate and regular rhythm.      Heart sounds: Normal heart sounds.   Pulmonary:      Effort: Pulmonary effort is normal.      Breath sounds: Normal breath sounds.   Abdominal:      General: Bowel sounds are normal.      Palpations: Abdomen is soft.      Tenderness: There is no abdominal tenderness.   Musculoskeletal:         General: No tenderness or deformity. Normal range of motion.      Cervical back: Normal range of motion and neck supple.   Lymphadenopathy:      Cervical: No cervical adenopathy.   Skin:     General: Skin is warm and dry.   Neurological:      Mental Status: She is alert and oriented to person, place, and time.      Cranial Nerves: No cranial nerve deficit.      Motor: No abnormal muscle tone.      Coordination: Coordination normal.      Deep Tendon Reflexes: Reflexes normal.   Psychiatric:         Behavior: Behavior normal.       Assessment:       1. Pulmonary nodule    2. Primary insomnia    3. Osteoporosis, unspecified osteoporosis type, unspecified pathological fracture presence    4. Anxiety    5. Raynaud's disease without gangrene        Plan:       Bev was seen today  for follow-up.    Diagnoses and all orders for this visit:    Pulmonary nodule  -     CT Chest Without Contrast; Future    Primary insomnia  -     eszopiclone (LUNESTA) 1 MG Tab; Take 1 tablet (1 mg total) by mouth nightly as needed (sleep).    Osteoporosis, unspecified osteoporosis type, unspecified pathological fracture presence  -     alendronate (FOSAMAX) 70 MG tablet; Take 1 tablet (70 mg total) by mouth every 7 days.    Anxiety  -     EScitalopram oxalate (LEXAPRO) 5 MG Tab; Take 1 tablet (5 mg total) by mouth once daily.    Raynaud's disease without gangrene  -     amLODIPine (NORVASC) 5 MG tablet; Take 1 tablet (5 mg total) by mouth once daily.      During this visit, I reviewed the pt's history, medications, allergies, and problem list.

## 2024-06-18 ENCOUNTER — HOSPITAL ENCOUNTER (OUTPATIENT)
Dept: RADIOLOGY | Facility: HOSPITAL | Age: 74
Discharge: HOME | End: 2024-06-18
Attending: FAMILY MEDICINE
Payer: MEDICARE

## 2024-06-18 DIAGNOSIS — R91.1 PULMONARY NODULE: ICD-10-CM

## 2024-06-18 PROCEDURE — 71250 CT THORAX DX C-: CPT | Mod: TC,PO

## 2024-06-18 PROCEDURE — 71250 CT THORAX DX C-: CPT | Mod: 26,,, | Performed by: RADIOLOGY

## 2024-07-11 ENCOUNTER — PATIENT MESSAGE (OUTPATIENT)
Dept: ADMINISTRATIVE | Facility: HOSPITAL | Age: 74
End: 2024-07-11
Payer: MEDICARE

## 2024-09-08 DIAGNOSIS — E78.00 HYPERCHOLESTEROLEMIA: Primary | ICD-10-CM

## 2024-09-09 RX ORDER — PRAVASTATIN SODIUM 10 MG/1
10 TABLET ORAL DAILY
Qty: 90 TABLET | Refills: 0 | Status: SHIPPED | OUTPATIENT
Start: 2024-09-09 | End: 2025-09-09

## 2024-10-08 ENCOUNTER — OFFICE VISIT (OUTPATIENT)
Dept: CARDIOLOGY | Facility: CLINIC | Age: 74
End: 2024-10-08
Payer: MEDICARE

## 2024-10-08 VITALS
HEART RATE: 87 BPM | HEIGHT: 67 IN | WEIGHT: 162.06 LBS | SYSTOLIC BLOOD PRESSURE: 138 MMHG | DIASTOLIC BLOOD PRESSURE: 75 MMHG | BODY MASS INDEX: 25.44 KG/M2

## 2024-10-08 DIAGNOSIS — E78.49 OTHER HYPERLIPIDEMIA: Chronic | ICD-10-CM

## 2024-10-08 DIAGNOSIS — Z72.0 TOBACCO USE: Chronic | ICD-10-CM

## 2024-10-08 DIAGNOSIS — I65.23 CAROTID ARTERY PLAQUE, BILATERAL: Chronic | ICD-10-CM

## 2024-10-08 DIAGNOSIS — I70.0 THORACIC AORTIC ATHEROSCLEROSIS: Chronic | ICD-10-CM

## 2024-10-08 DIAGNOSIS — I10 ESSENTIAL HYPERTENSION: Chronic | ICD-10-CM

## 2024-10-08 DIAGNOSIS — I34.0 NON-RHEUMATIC MITRAL REGURGITATION: Primary | Chronic | ICD-10-CM

## 2024-10-08 DIAGNOSIS — I77.9 MILD CAROTID ARTERY DISEASE: Chronic | ICD-10-CM

## 2024-10-08 DIAGNOSIS — I73.00 RAYNAUD'S DISEASE WITHOUT GANGRENE: Chronic | ICD-10-CM

## 2024-10-08 PROCEDURE — 3078F DIAST BP <80 MM HG: CPT | Mod: CPTII,S$GLB,, | Performed by: INTERNAL MEDICINE

## 2024-10-08 PROCEDURE — 99214 OFFICE O/P EST MOD 30 MIN: CPT | Mod: S$GLB,,, | Performed by: INTERNAL MEDICINE

## 2024-10-08 PROCEDURE — 3288F FALL RISK ASSESSMENT DOCD: CPT | Mod: CPTII,S$GLB,, | Performed by: INTERNAL MEDICINE

## 2024-10-08 PROCEDURE — 1101F PT FALLS ASSESS-DOCD LE1/YR: CPT | Mod: CPTII,S$GLB,, | Performed by: INTERNAL MEDICINE

## 2024-10-08 PROCEDURE — 1159F MED LIST DOCD IN RCRD: CPT | Mod: CPTII,S$GLB,, | Performed by: INTERNAL MEDICINE

## 2024-10-08 PROCEDURE — 99999 PR PBB SHADOW E&M-EST. PATIENT-LVL III: CPT | Mod: PBBFAC,,, | Performed by: INTERNAL MEDICINE

## 2024-10-08 PROCEDURE — 3008F BODY MASS INDEX DOCD: CPT | Mod: CPTII,S$GLB,, | Performed by: INTERNAL MEDICINE

## 2024-10-08 PROCEDURE — 1126F AMNT PAIN NOTED NONE PRSNT: CPT | Mod: CPTII,S$GLB,, | Performed by: INTERNAL MEDICINE

## 2024-10-08 PROCEDURE — 3075F SYST BP GE 130 - 139MM HG: CPT | Mod: CPTII,S$GLB,, | Performed by: INTERNAL MEDICINE

## 2024-10-08 RX ORDER — PRAVASTATIN SODIUM 10 MG/1
10 TABLET ORAL NIGHTLY
Qty: 90 TABLET | Refills: 2 | Status: SHIPPED | OUTPATIENT
Start: 2024-10-08 | End: 2025-10-08

## 2024-10-08 NOTE — PROGRESS NOTES
Subjective:    Patient ID:  Bev Miguel is a 74 y.o. female who presents for Follow-up (9month)        HPI  LAST LABS NOTED CMP OK, LDL 75, HDL 94, TRIGLYCERIDE 42, CHEST CT STABLE NODULES, DOING WELL, STILL SMOKES, STABLE RAYNAUD'S, SEE ROS    Past Medical History:   Diagnosis Date    Abnormal EKG     Allergy     perennial; sees Dr Jose ENT for injections    Asthma     mild interm    Breast CA     Cancer     ovarian    Chest pain     Chronic insomnia     Colonic polyp     Heart murmur     Hyperlipidemia     Hypoglycemia     Macrocytosis     Mild carotid artery disease 4/29/2019    Mitral valve regurgitation     Osteopenia     Ovarian cancer     SOB (shortness of breath)     Uterine cancer     Valvular regurgitation     Varicose vein of leg     Venous insufficiency     Vitamin C deficiency      Past Surgical History:   Procedure Laterality Date    BREAST SURGERY  05/28/2010    bilateral mastectomy/breast reconstruction; R breast ca.; no XRT/chemo. [facility]ara for 5 yrs.    hip replacement Right 2021    OOPHORECTOMY      sinuplasty      TC  02/20/2017    no polyps; GI/Dr March. 5 yr repeat.    TONSILLECTOMY      URETEROTOMY       Family History   Problem Relation Name Age of Onset    Cancer Father      Early death Father      Early death Mother      Miscarriages / Stillbirths Mother      Asthma Brother      Early death Brother      Colon cancer Neg Hx       Social History     Socioeconomic History    Marital status:     Number of children: 2   Occupational History    Occupation: retired sales management   Tobacco Use    Smoking status: Every Day     Current packs/day: 0.25     Average packs/day: 0.3 packs/day for 45.0 years (11.3 ttl pk-yrs)     Types: Cigarettes    Smokeless tobacco: Never    Tobacco comments:     Pt states she smokes 5-6 cigarettes a day.   Substance and Sexual Activity    Alcohol use: Yes     Alcohol/week: 2.0 standard drinks of alcohol     Types: 2 Shots of liquor per week      Comment: 2 cocktails daily; wine 5 glasses a week.    Drug use: No     Social Drivers of Health     Financial Resource Strain: Low Risk  (10/10/2023)    Overall Financial Resource Strain (CARDIA)     Difficulty of Paying Living Expenses: Not very hard   Food Insecurity: No Food Insecurity (10/10/2023)    Hunger Vital Sign     Worried About Running Out of Food in the Last Year: Never true     Ran Out of Food in the Last Year: Never true   Transportation Needs: No Transportation Needs (10/10/2023)    PRAPARE - Transportation     Lack of Transportation (Medical): No     Lack of Transportation (Non-Medical): No   Physical Activity: Insufficiently Active (10/10/2023)    Exercise Vital Sign     Days of Exercise per Week: 3 days     Minutes of Exercise per Session: 30 min   Stress: Stress Concern Present (10/10/2023)    [employer] [place] [employer] - Occupational Stress Questionnaire     Feeling of Stress : To some extent   Housing Stability: Low Risk  (10/10/2023)    Housing Stability Vital Sign     Unable to Pay for Housing in the Last Year: No     Number of Places Lived in the Last Year: 1     Unstable Housing in the Last Year: No       Review of patient's allergies indicates:   Allergen Reactions    Benadryl [diphenhydramine hcl]     Biaxin [clarithromycin]     Iodine and iodide containing products     Levaquin [levofloxacin]     Sulfa (sulfonamide antibiotics)     Trazodone     Zyban [bupropion hcl (smoking deter)]     Bactrim [sulfamethoxazole-trimethoprim] Rash    Gadolinium-containing contrast media Rash       Current Outpatient Medications:     alendronate (FOSAMAX) 70 MG tablet, Take 1 tablet (70 mg total) by mouth every 7 days., Disp: 12 tablet, Rfl: 1    amLODIPine (NORVASC) 5 MG tablet, Take 1 tablet (5 mg total) by mouth once daily., Disp: 90 tablet, Rfl: 3    ascorbic acid, vitamin C, (VITAMIN C) 1000 MG tablet, Take 1,000 mg by mouth once daily., Disp: , Rfl:     aspirin (ECOTRIN) 81 MG EC  tablet, Take 81 mg by mouth once daily., Disp: , Rfl:     calcium carbonate (OS-ALEK) 600 mg calcium (1,500 mg) Tab, Take 600 mg by mouth once., Disp: , Rfl:     cyanocobalamin (VITAMIN B-12) 1000 MCG tablet, Take 3,000 mcg by mouth once daily., Disp: , Rfl:     EScitalopram oxalate (LEXAPRO) 5 MG Tab, Take 1 tablet (5 mg total) by mouth once daily., Disp: 90 tablet, Rfl: 3    eszopiclone (LUNESTA) 1 MG Tab, Take 1 tablet (1 mg total) by mouth nightly as needed (sleep)., Disp: 90 tablet, Rfl: 1    ibuprofen (ADVIL,MOTRIN) 200 MG tablet, Take 200 mg by mouth every 6 (six) hours as needed for Pain., Disp: , Rfl:     Lactobacillus rhamnosus GG (CULTURELLE) 10 billion cell capsule, Take 1 capsule by mouth once daily., Disp: , Rfl:     MULTIVIT-MIN/IRON/FOLIC/LUTEIN (CENTRUM SILVER WOMEN ORAL), Take 1 capsule by mouth once daily., Disp: , Rfl:     prasterone, dhea, (INTRAROSA) 6.5 mg Inst, Place 1 each vaginally every evening., Disp: 28 each, Rfl: 11    tumeric-ging-olive-oreg-capryl 100 mg-150 mg- 50 mg-150 mg Cap, Take by mouth., Disp: , Rfl:     vitamin D 1000 units Tab, Take 2,000 mg by mouth 2 (two) times a day., Disp: , Rfl:     vitamin E 400 UNIT capsule, Take 1,000 Units by mouth once daily. , Disp: , Rfl:     pravastatin (PRAVACHOL) 10 MG tablet, Take 1 tablet (10 mg total) by mouth every evening., Disp: 90 tablet, Rfl: 2    Review of Systems   Constitutional: Negative for chills, diaphoresis, fever, malaise/fatigue and night sweats.   HENT:  Negative for congestion and sore throat.    Eyes:  Negative for blurred vision and visual disturbance.   Cardiovascular:  Negative for chest pain, claudication, cyanosis, dyspnea on exertion, irregular heartbeat, leg swelling, near-syncope, orthopnea, palpitations, paroxysmal nocturnal dyspnea and syncope.        STABLE RAYNAUD'S   Respiratory:  Negative for cough, hemoptysis, shortness of breath and wheezing.    Endocrine: Negative for polyphagia and polyuria.  "  Hematologic/Lymphatic: Negative for adenopathy. Does not bruise/bleed easily.   Skin:  Positive for color change (OCC). Negative for rash.   Musculoskeletal:  Negative for back pain, falls and muscle weakness.   Gastrointestinal:  Negative for abdominal pain, dysphagia, jaundice, melena and nausea.   Genitourinary:  Negative for dysuria and flank pain.   Neurological:  Negative for focal weakness, headaches, light-headedness, loss of balance and weakness.   Psychiatric/Behavioral:  Negative for altered mental status and depression.    Allergic/Immunologic: Negative for hives and persistent infections.        Objective:      Vitals:    10/08/24 1030   BP: 138/75   Pulse: 87   Weight: 73.5 kg (162 lb 0.6 oz)   Height: 5' 7" (1.702 m)   PainSc: 0-No pain     Body mass index is 25.38 kg/m².    Physical Exam  Constitutional:       Appearance: Normal appearance. She is well-developed.   HENT:      Head: Normocephalic and atraumatic.   Eyes:      General: No scleral icterus.     Extraocular Movements: Extraocular movements intact.      Right eye: Normal extraocular motion.   Neck:      Vascular: Normal carotid pulses. No hepatojugular reflux or JVD.   Cardiovascular:      Rate and Rhythm: Normal rate and regular rhythm. No extrasystoles are present.     Pulses:           Carotid pulses are 2+ on the right side and 2+ on the left side.       Radial pulses are 2+ on the right side and 2+ on the left side.        Posterior tibial pulses are 2+ on the right side and 2+ on the left side.      Heart sounds: Murmur heard.      Systolic murmur is present with a grade of 1/6 at the lower left sternal border.      No friction rub. No gallop. No S4 sounds.   Pulmonary:      Effort: Pulmonary effort is normal. No respiratory distress.      Breath sounds: Normal breath sounds and air entry. No rales.   Abdominal:      Palpations: Abdomen is soft. There is no hepatomegaly.      Tenderness: There is no abdominal tenderness. "   Musculoskeletal:      Cervical back: Neck supple.      Right lower leg: No edema.      Left lower leg: No edema.   Skin:     General: Skin is warm and dry.      Capillary Refill: Capillary refill takes 2 to 3 seconds.      Comments:     Neurological:      General: No focal deficit present.      Mental Status: She is alert and oriented to person, place, and time.      Cranial Nerves: Cranial nerves 2-12 are intact.   Psychiatric:         Mood and Affect: Mood normal.         Speech: Speech normal.         Behavior: Behavior normal.                 ..    Chemistry        Component Value Date/Time     12/11/2023 0810    K 4.5 12/11/2023 0810     12/11/2023 0810    CO2 23 12/11/2023 0810    BUN 14 12/11/2023 0810    CREATININE 0.7 12/11/2023 0810    GLU 99 12/11/2023 0810        Component Value Date/Time    CALCIUM 9.1 12/11/2023 0810    ALKPHOS 83 12/11/2023 0810    AST 27 12/11/2023 0810    AST 27 03/14/2016 1523    ALT 19 12/11/2023 0810    BILITOT 0.8 12/11/2023 0810    ESTGFRAFRICA >60.0 12/06/2021 0730    ESTGFRAFRICA >60.0 12/06/2021 0730    EGFRNONAA >60.0 12/06/2021 0730    EGFRNONAA >60.0 12/06/2021 0730            ..  Lab Results   Component Value Date    CHOL 177 12/11/2023    CHOL 191 02/22/2023    CHOL 198 12/06/2021     Lab Results   Component Value Date    HDL 94 (H) 12/11/2023    HDL 91 (H) 02/22/2023    HDL 88 (H) 12/06/2021     Lab Results   Component Value Date    LDLCALC 74.6 12/11/2023    LDLCALC 83.6 02/22/2023    LDLCALC 98.0 12/06/2021     Lab Results   Component Value Date    TRIG 42 12/11/2023    TRIG 82 02/22/2023    TRIG 60 12/06/2021     Lab Results   Component Value Date    CHOLHDL 53.1 (H) 12/11/2023    CHOLHDL 47.6 02/22/2023    CHOLHDL 44.4 12/06/2021     ..  Lab Results   Component Value Date    WBC 7.14 12/11/2023    HGB 13.3 12/11/2023    HCT 40.4 12/11/2023     (H) 12/11/2023     12/11/2023       Test(s) Reviewed  I have reviewed the following in  detail:  [] Stress test   [] Angiography   [] Echocardiogram   [x] Labs   [x] Other:       Assessment:         ICD-10-CM ICD-9-CM   1. Non-rheumatic mitral regurgitation  I34.0 424.0   2. Mild carotid artery disease  I77.9 447.9   3. Thoracic aortic atherosclerosis  I70.0 440.0   4. Carotid artery plaque, bilateral  I65.23 433.10     433.30   5. Raynaud's disease without gangrene  I73.00 443.0   6. Essential hypertension  I10 401.9   7. Other hyperlipidemia  E78.49 272.4   8. Tobacco use  Z72.0 305.1     Problem List Items Addressed This Visit          Cardiac/Vascular    Raynaud's disease without gangrene    Non-rheumatic mitral regurgitation - Primary    Hypercholesterolemia    Relevant Medications    pravastatin (PRAVACHOL) 10 MG tablet    Essential hypertension    Mild carotid artery disease    Thoracic aortic atherosclerosis    Overview     cxr 2019         Carotid artery plaque, bilateral       Other    Tobacco use        Plan:         TOBACCO CESSATION COUNSELING AVOID SUDDEN CHANGE IN TEMPERATURE OR COLD EXPOSURE,, ALL CV CLINICALLY STABLE, NO ANGINA, NO HF, NO TIA, NO CLINICAL ARRHYTHMIA,CONTINUE CURRENT MEDS, EDUCATION, DIET, EXERCISE , WALKING EXERCISE PROGRAM RETURN TO CLINIC IN, 9 MO  Non-rheumatic mitral regurgitation    Mild carotid artery disease    Thoracic aortic atherosclerosis  Comments:  NO EMBOLIZATION    Carotid artery plaque, bilateral    Raynaud's disease without gangrene    Essential hypertension    Other hyperlipidemia  -     pravastatin (PRAVACHOL) 10 MG tablet; Take 1 tablet (10 mg total) by mouth every evening.  Dispense: 90 tablet; Refill: 2    Tobacco use  Comments:  COUNSELING    RTC Low level/low impact aerobic exercise 5x's/wk. Heart healthy diet and risk factor modification.    See labs and med orders.    Aerobic exercise 5x's/wk. Heart healthy diet and risk factor modification.    See labs and med orders.      ALL CV CLINICALLY STABLE, NO ANGINA, NO HF, NO TIA, NO CLINICAL  ARRHYTHMIA,CONTINUE CURRENT MEDS, EDUCATION, DIET, EXERCISE

## 2024-10-28 DIAGNOSIS — M81.0 OSTEOPOROSIS, UNSPECIFIED OSTEOPOROSIS TYPE, UNSPECIFIED PATHOLOGICAL FRACTURE PRESENCE: ICD-10-CM

## 2024-10-30 RX ORDER — ALENDRONATE SODIUM 70 MG/1
TABLET ORAL
Qty: 12 TABLET | Refills: 3 | Status: SHIPPED | OUTPATIENT
Start: 2024-10-30

## 2024-11-22 DIAGNOSIS — E78.49 OTHER HYPERLIPIDEMIA: Chronic | ICD-10-CM

## 2024-11-24 RX ORDER — PRAVASTATIN SODIUM 10 MG/1
10 TABLET ORAL
Qty: 90 TABLET | Refills: 2 | Status: SHIPPED | OUTPATIENT
Start: 2024-11-24

## 2024-12-09 ENCOUNTER — OFFICE VISIT (OUTPATIENT)
Dept: FAMILY MEDICINE | Facility: CLINIC | Age: 74
End: 2024-12-09
Payer: MEDICARE

## 2024-12-09 VITALS
HEART RATE: 80 BPM | BODY MASS INDEX: 25.08 KG/M2 | OXYGEN SATURATION: 96 % | DIASTOLIC BLOOD PRESSURE: 88 MMHG | SYSTOLIC BLOOD PRESSURE: 134 MMHG | WEIGHT: 159.81 LBS | HEIGHT: 67 IN

## 2024-12-09 DIAGNOSIS — F33.0 MILD EPISODE OF RECURRENT MAJOR DEPRESSIVE DISORDER: ICD-10-CM

## 2024-12-09 DIAGNOSIS — E78.2 MIXED HYPERLIPIDEMIA: ICD-10-CM

## 2024-12-09 DIAGNOSIS — I10 ESSENTIAL HYPERTENSION: ICD-10-CM

## 2024-12-09 DIAGNOSIS — M81.0 AGE-RELATED OSTEOPOROSIS WITHOUT CURRENT PATHOLOGICAL FRACTURE: Primary | ICD-10-CM

## 2024-12-09 DIAGNOSIS — I65.23 CAROTID ARTERY PLAQUE, BILATERAL: ICD-10-CM

## 2024-12-09 PROCEDURE — 3075F SYST BP GE 130 - 139MM HG: CPT | Mod: CPTII,S$GLB,, | Performed by: FAMILY MEDICINE

## 2024-12-09 PROCEDURE — 99999 PR PBB SHADOW E&M-EST. PATIENT-LVL IV: CPT | Mod: PBBFAC,,, | Performed by: FAMILY MEDICINE

## 2024-12-09 PROCEDURE — 99214 OFFICE O/P EST MOD 30 MIN: CPT | Mod: S$GLB,,, | Performed by: FAMILY MEDICINE

## 2024-12-09 PROCEDURE — 1101F PT FALLS ASSESS-DOCD LE1/YR: CPT | Mod: CPTII,S$GLB,, | Performed by: FAMILY MEDICINE

## 2024-12-09 PROCEDURE — 1159F MED LIST DOCD IN RCRD: CPT | Mod: CPTII,S$GLB,, | Performed by: FAMILY MEDICINE

## 2024-12-09 PROCEDURE — 3079F DIAST BP 80-89 MM HG: CPT | Mod: CPTII,S$GLB,, | Performed by: FAMILY MEDICINE

## 2024-12-09 PROCEDURE — 3008F BODY MASS INDEX DOCD: CPT | Mod: CPTII,S$GLB,, | Performed by: FAMILY MEDICINE

## 2024-12-09 PROCEDURE — 1160F RVW MEDS BY RX/DR IN RCRD: CPT | Mod: CPTII,S$GLB,, | Performed by: FAMILY MEDICINE

## 2024-12-09 PROCEDURE — 1126F AMNT PAIN NOTED NONE PRSNT: CPT | Mod: CPTII,S$GLB,, | Performed by: FAMILY MEDICINE

## 2024-12-09 PROCEDURE — 3288F FALL RISK ASSESSMENT DOCD: CPT | Mod: CPTII,S$GLB,, | Performed by: FAMILY MEDICINE

## 2024-12-09 NOTE — PROGRESS NOTES
Subjective:       Patient ID: Bev Miguel is a 74 y.o. female.    Chief Complaint: Follow-up    Pt is known to me.  Pt is here for followup of chronic medical issues.  The pt is doing well in general with no acute complaints.  The pt increased back to the 10 mg Lexapro.    Discussed health maintenance with pt and will schedule appropriate studies and/or immunizations.        Review of Systems    Objective:      Physical Exam  Vitals and nursing note reviewed.   Constitutional:       General: She is not in acute distress.     Appearance: She is well-developed. She is obese. She is not ill-appearing.   HENT:      Head: Normocephalic.   Eyes:      Conjunctiva/sclera: Conjunctivae normal.      Pupils: Pupils are equal, round, and reactive to light.   Neck:      Thyroid: No thyromegaly.   Cardiovascular:      Rate and Rhythm: Normal rate and regular rhythm.      Heart sounds: Normal heart sounds.   Pulmonary:      Effort: Pulmonary effort is normal.      Breath sounds: Normal breath sounds.   Abdominal:      General: Bowel sounds are normal.      Palpations: Abdomen is soft.      Tenderness: There is no abdominal tenderness.   Musculoskeletal:         General: No tenderness or deformity. Normal range of motion.      Cervical back: Normal range of motion and neck supple.      Right lower leg: No edema.      Left lower leg: No edema.   Lymphadenopathy:      Cervical: No cervical adenopathy.   Skin:     General: Skin is warm and dry.   Neurological:      Mental Status: She is alert and oriented to person, place, and time.      Cranial Nerves: No cranial nerve deficit.      Motor: No abnormal muscle tone.      Coordination: Coordination normal.      Deep Tendon Reflexes: Reflexes normal.   Psychiatric:         Behavior: Behavior normal.         Assessment:       1. Age-related osteoporosis without current pathological fracture    2. Essential hypertension    3. Mild episode of recurrent major depressive disorder    4.  Carotid artery plaque, bilateral    5. Mixed hyperlipidemia        Plan:       Bev was seen today for follow-up.    Diagnoses and all orders for this visit:    Age-related osteoporosis without current pathological fracture  -     DXA Bone Density Axial Skeleton 1 or more sites; Future    Essential hypertension  -     CBC Auto Differential; Future  -     Comprehensive Metabolic Panel; Future  -     Lipid Panel; Future    Mild episode of recurrent major depressive disorder    Carotid artery plaque, bilateral  -     Lipid Panel; Future    Mixed hyperlipidemia  -     Lipid Panel; Future  -     TSH; Future      During this visit, I reviewed the pt's history, medications, allergies, and problem list.

## 2024-12-16 ENCOUNTER — HOSPITAL ENCOUNTER (OUTPATIENT)
Dept: RADIOLOGY | Facility: HOSPITAL | Age: 74
Discharge: HOME | End: 2024-12-16
Attending: FAMILY MEDICINE
Payer: MEDICARE

## 2024-12-16 DIAGNOSIS — M81.0 AGE-RELATED OSTEOPOROSIS WITHOUT CURRENT PATHOLOGICAL FRACTURE: ICD-10-CM

## 2024-12-16 PROCEDURE — 77091 TBS TECHL CALCULATION ONLY: CPT | Mod: PO

## 2024-12-16 PROCEDURE — 77080 DXA BONE DENSITY AXIAL: CPT | Mod: 26,,, | Performed by: RADIOLOGY

## 2024-12-16 PROCEDURE — 77092 TBS I&R FX RSK QHP: CPT | Mod: ,,, | Performed by: RADIOLOGY

## 2024-12-18 ENCOUNTER — LAB VISIT (OUTPATIENT)
Dept: LAB | Facility: HOSPITAL | Age: 74
End: 2024-12-18
Attending: FAMILY MEDICINE
Payer: MEDICARE

## 2024-12-18 DIAGNOSIS — E78.2 MIXED HYPERLIPIDEMIA: ICD-10-CM

## 2024-12-18 DIAGNOSIS — I10 ESSENTIAL HYPERTENSION: ICD-10-CM

## 2024-12-18 DIAGNOSIS — I65.23 CAROTID ARTERY PLAQUE, BILATERAL: ICD-10-CM

## 2024-12-18 LAB
ALBUMIN SERPL BCP-MCNC: 3.9 G/DL (ref 3.5–5.2)
ALP SERPL-CCNC: 81 U/L (ref 40–150)
ALT SERPL W/O P-5'-P-CCNC: 50 U/L (ref 10–44)
ANION GAP SERPL CALC-SCNC: 12 MMOL/L (ref 8–16)
AST SERPL-CCNC: 51 U/L (ref 10–40)
BASOPHILS # BLD AUTO: 0.07 K/UL (ref 0–0.2)
BASOPHILS NFR BLD: 0.9 % (ref 0–1.9)
BILIRUB SERPL-MCNC: 0.7 MG/DL (ref 0.1–1)
BUN SERPL-MCNC: 14 MG/DL (ref 8–23)
CALCIUM SERPL-MCNC: 9.3 MG/DL (ref 8.7–10.5)
CHLORIDE SERPL-SCNC: 103 MMOL/L (ref 95–110)
CHOLEST SERPL-MCNC: 190 MG/DL (ref 120–199)
CHOLEST/HDLC SERPL: 1.8 {RATIO} (ref 2–5)
CO2 SERPL-SCNC: 22 MMOL/L (ref 23–29)
CREAT SERPL-MCNC: 0.7 MG/DL (ref 0.5–1.4)
DIFFERENTIAL METHOD BLD: ABNORMAL
EOSINOPHIL # BLD AUTO: 0.3 K/UL (ref 0–0.5)
EOSINOPHIL NFR BLD: 4 % (ref 0–8)
ERYTHROCYTE [DISTWIDTH] IN BLOOD BY AUTOMATED COUNT: 14.1 % (ref 11.5–14.5)
EST. GFR  (NO RACE VARIABLE): >60 ML/MIN/1.73 M^2
GLUCOSE SERPL-MCNC: 104 MG/DL (ref 70–110)
HCT VFR BLD AUTO: 40.4 % (ref 37–48.5)
HDLC SERPL-MCNC: 105 MG/DL (ref 40–75)
HDLC SERPL: 55.3 % (ref 20–50)
HGB BLD-MCNC: 13.4 G/DL (ref 12–16)
IMM GRANULOCYTES # BLD AUTO: 0.05 K/UL (ref 0–0.04)
IMM GRANULOCYTES NFR BLD AUTO: 0.6 % (ref 0–0.5)
LDLC SERPL CALC-MCNC: 74.8 MG/DL (ref 63–159)
LYMPHOCYTES # BLD AUTO: 3.8 K/UL (ref 1–4.8)
LYMPHOCYTES NFR BLD: 47.3 % (ref 18–48)
MCH RBC QN AUTO: 34 PG (ref 27–31)
MCHC RBC AUTO-ENTMCNC: 33.2 G/DL (ref 32–36)
MCV RBC AUTO: 103 FL (ref 82–98)
MONOCYTES # BLD AUTO: 0.5 K/UL (ref 0.3–1)
MONOCYTES NFR BLD: 5.9 % (ref 4–15)
NEUTROPHILS # BLD AUTO: 3.3 K/UL (ref 1.8–7.7)
NEUTROPHILS NFR BLD: 41.3 % (ref 38–73)
NONHDLC SERPL-MCNC: 85 MG/DL
NRBC BLD-RTO: 0 /100 WBC
PLATELET # BLD AUTO: 266 K/UL (ref 150–450)
PMV BLD AUTO: 11.4 FL (ref 9.2–12.9)
POTASSIUM SERPL-SCNC: 4.4 MMOL/L (ref 3.5–5.1)
PROT SERPL-MCNC: 7.2 G/DL (ref 6–8.4)
RBC # BLD AUTO: 3.94 M/UL (ref 4–5.4)
SODIUM SERPL-SCNC: 137 MMOL/L (ref 136–145)
TRIGL SERPL-MCNC: 51 MG/DL (ref 30–150)
TSH SERPL DL<=0.005 MIU/L-ACNC: 2.47 UIU/ML (ref 0.4–4)
WBC # BLD AUTO: 7.97 K/UL (ref 3.9–12.7)

## 2024-12-18 PROCEDURE — 36415 COLL VENOUS BLD VENIPUNCTURE: CPT | Mod: PO | Performed by: FAMILY MEDICINE

## 2024-12-18 PROCEDURE — 80053 COMPREHEN METABOLIC PANEL: CPT | Performed by: FAMILY MEDICINE

## 2024-12-18 PROCEDURE — 85025 COMPLETE CBC W/AUTO DIFF WBC: CPT | Performed by: FAMILY MEDICINE

## 2024-12-18 PROCEDURE — 80061 LIPID PANEL: CPT | Performed by: FAMILY MEDICINE

## 2024-12-18 PROCEDURE — 84443 ASSAY THYROID STIM HORMONE: CPT | Performed by: FAMILY MEDICINE

## 2025-01-02 ENCOUNTER — PATIENT MESSAGE (OUTPATIENT)
Dept: FAMILY MEDICINE | Facility: CLINIC | Age: 75
End: 2025-01-02
Payer: MEDICARE

## 2025-01-29 DIAGNOSIS — F41.9 ANXIETY: ICD-10-CM

## 2025-01-29 DIAGNOSIS — I73.00 RAYNAUD'S DISEASE WITHOUT GANGRENE: ICD-10-CM

## 2025-01-29 RX ORDER — AMLODIPINE BESYLATE 5 MG/1
5 TABLET ORAL DAILY
Qty: 90 TABLET | Refills: 3 | Status: SHIPPED | OUTPATIENT
Start: 2025-01-29

## 2025-01-29 RX ORDER — ESCITALOPRAM OXALATE 5 MG/1
5 TABLET ORAL DAILY
Qty: 90 TABLET | Refills: 3 | Status: SHIPPED | OUTPATIENT
Start: 2025-01-29

## 2025-01-29 NOTE — TELEPHONE ENCOUNTER
Refill Decision Note   Bev Miguel  is requesting a refill authorization.  Brief Assessment and Rationale for Refill:  Approve     Medication Therapy Plan:         Comments:     Note composed:4:06 PM 01/29/2025

## 2025-01-29 NOTE — TELEPHONE ENCOUNTER
Care Due:                  Date            Visit Type   Department     Provider  --------------------------------------------------------------------------------                                EP -                              PRIMARY      [facility] FAMILY  Last Visit: 12-      CARE ([facility])   MEDICINE       JOSÉ MIGUEL BREWER  Next Visit: None Scheduled  None         None Found                                                            Last  Test          Frequency    Reason                     Performed    Due Date  --------------------------------------------------------------------------------    Mg Level....  12 months..  alendronate..............  Not Found    Overdue    Phosphate...  12 months..  alendronate..............  Not Found    Overdue    [employer] Embedded Care Due Messages. Reference number: [id number].   1/29/2025 3:30:22 PM CST

## 2025-03-17 DIAGNOSIS — F51.01 PRIMARY INSOMNIA: ICD-10-CM

## 2025-03-18 NOTE — TELEPHONE ENCOUNTER
No care due was identified.  [facility] Embedded Care Due Messages. Reference number: [id number].   3/17/2025 7:24:42 PM CDT

## 2025-03-19 RX ORDER — ESZOPICLONE 1 MG/1
TABLET, FILM COATED ORAL
Qty: 90 TABLET | Refills: 0 | Status: SHIPPED | OUTPATIENT
Start: 2025-03-19

## 2025-05-05 ENCOUNTER — OFFICE VISIT (OUTPATIENT)
Dept: OBSTETRICS AND GYNECOLOGY | Facility: CLINIC | Age: 75
End: 2025-05-05
Payer: MEDICARE

## 2025-05-05 VITALS
BODY MASS INDEX: 25.53 KG/M2 | SYSTOLIC BLOOD PRESSURE: 138 MMHG | WEIGHT: 162.69 LBS | DIASTOLIC BLOOD PRESSURE: 64 MMHG | HEIGHT: 67 IN

## 2025-05-05 DIAGNOSIS — Z01.419 WELL WOMAN EXAM: Primary | ICD-10-CM

## 2025-05-05 PROCEDURE — 1159F MED LIST DOCD IN RCRD: CPT | Mod: CPTII,S$GLB,,

## 2025-05-05 PROCEDURE — 1160F RVW MEDS BY RX/DR IN RCRD: CPT | Mod: CPTII,S$GLB,,

## 2025-05-05 PROCEDURE — 99999 PR PBB SHADOW E&M-EST. PATIENT-LVL IV: CPT | Mod: PBBFAC,,,

## 2025-05-05 PROCEDURE — 1126F AMNT PAIN NOTED NONE PRSNT: CPT | Mod: CPTII,S$GLB,,

## 2025-05-05 PROCEDURE — 1101F PT FALLS ASSESS-DOCD LE1/YR: CPT | Mod: CPTII,S$GLB,,

## 2025-05-05 PROCEDURE — 3288F FALL RISK ASSESSMENT DOCD: CPT | Mod: CPTII,S$GLB,,

## 2025-05-05 PROCEDURE — 3078F DIAST BP <80 MM HG: CPT | Mod: CPTII,S$GLB,,

## 2025-05-05 PROCEDURE — 3075F SYST BP GE 130 - 139MM HG: CPT | Mod: CPTII,S$GLB,,

## 2025-05-05 PROCEDURE — G0101 CA SCREEN;PELVIC/BREAST EXAM: HCPCS | Mod: S$GLB,,,

## 2025-05-05 NOTE — PROGRESS NOTES
Chief Complaint   Patient presents with    Well Woman       History and Physical:  No LMP recorded. Patient has had a hysterectomy.       Bev Miguel is a 74 y.o. WF who presents today for her routine annual GYN exam. The patient has no Gynecology complaints today. No PMB. Hx of hyst/USO in 20's for endometrial/ovarian cancer? Left one ovary behind? No hx of abnormal pap smear prior to hyst. Breast cancer survivor, double mastectomy. Neg BRCA per patient. Routine screenings with Dr. Bush's office.       Allergies:   Review of patient's allergies indicates:   Allergen Reactions    Benadryl [diphenhydramine hcl]     Biaxin [clarithromycin]     Iodine and iodide containing products     Levaquin [levofloxacin]     Sulfa (sulfonamide antibiotics)     Trazodone     Zyban [bupropion hcl (smoking deter)]     Bactrim [sulfamethoxazole-trimethoprim] Rash    Gadolinium-containing contrast media Rash       Past Medical History:   Diagnosis Date    Abnormal EKG     Allergy     perennial; sees Dr Jose ENT for injections    Asthma     mild interm    Breast CA     Cancer     ovarian    Chest pain     Chronic insomnia     Colonic polyp     Heart murmur     Hyperlipidemia     Hypoglycemia     Macrocytosis     Mild carotid artery disease 2019    Mitral valve regurgitation     Osteopenia     Ovarian cancer     SOB (shortness of breath)     Uterine cancer     Valvular regurgitation     Varicose vein of leg     Venous insufficiency     Vitamin C deficiency        Past Surgical History:   Procedure Laterality Date    BREAST SURGERY  2010    bilateral mastectomy/breast reconstruction; R breast ca.; no XRT/chemo. [facility] for 5 yrs.    hip replacement Right     HYSTERECTOMY      OOPHORECTOMY      sinuplasty      TC  2017    no polyps; GI/Dr March. 5 yr repeat.    TONSILLECTOMY      URETEROTOMY         MEDS: Medications Ordered Prior to Encounter[1]    OB History          1    Para   1    Term   1     "        AB        Living             SAB        IAB        Ectopic        Multiple        Live Births                     Social History[2]    Family History   Problem Relation Name Age of Onset    Cancer Father      Early death Father      Early death Mother      Miscarriages / Stillbirths Mother      Asthma Brother      Early death Brother      Colon cancer Neg Hx           Past medical and surgical history reviewed.   I have reviewed the patient's medical history in detail and updated the computerized patient record.        Review of System:   General: no chills, fever, night sweats, weight gain or weight loss  Psychological: no depression or suicidal ideation  Breasts: no new or changing breast lumps, nipple discharge or masses.  Respiratory: no cough, shortness of breath, or wheezing  Cardiovascular: no chest pain or dyspnea on exertion  Gastrointestinal: no abdominal pain, change in bowel habits, or black or bloody stools  Genito-Urinary: no incontinence, urinary frequency/urgency or vulvar/vaginal symptoms, pelvic pain or abnormal vaginal bleeding.  Musculoskeletal: no gait disturbance or muscular weakness      Physical Exam:   /64 (BP Location: Left arm, Patient Position: Sitting)   Ht 5' 7" (1.702 m)   Wt 73.8 kg (162 lb 11.2 oz)   BMI 25.48 kg/m²   Constitutional: She is oriented to person, place, and time. She appears well-developed and well-nourished. No distress. Normal weight   HENT:   Head: Normocephalic and atraumatic.   Eyes: Conjunctivae and EOM are normal. No scleral icterus.   Neck: Normal range of motion. Neck supple. No tracheal deviation present.   Cardiovascular: Normal rate.    Pulmonary/Chest: Effort normal. No respiratory distress. She exhibits no tenderness.  Breasts: are symmetrical.   Right breast exhibits no inverted nipple, no mass, no nipple discharge, no skin change and no tenderness.   Left breast exhibits no inverted nipple, no mass, no nipple discharge, no skin " change and no tenderness.  Abdominal: Soft. She exhibits no distension and no mass. There is no tenderness. There is no rebound and no guarding.   Genitourinary:    External rectal exam shows no thrombosed external hemorrhoids.    Pelvic exam was performed with patient supine.   No labial fusion.   There is no rash, lesion or injury on the right labia.   There is no rash, lesion or injury on the left labia.   No bleeding and no signs of injury around the vaginal introitus, urethra is without lesions and well supported.    No vaginal discharge found.    No significant Cystocele, Enterocele or rectocele, and cuff well supported.   Bimanual exam:   The urethra and vagina are without palpable masses or tenderness.   Uterus and cervix are surgically absents, vaginal cuff is intact and well supported.   Right adnexum displays no mass and no tenderness.   Left adnexum displays no mass and no tenderness.  Musculoskeletal: Normal range of motion.   Neurological: She is alert and oriented to person, place, and time. Coordination normal.   Skin: Skin is warm and dry. She is not diaphoretic.   Psychiatric: She has a normal mood and affect.        Assessment:   Normal annual GYN exam  1. Well woman exam            Plan:   PAP  Not Needed  Continue breast screenings with Dr Bush's office.   Follow up as needed.            [1]   Current Outpatient Medications on File Prior to Visit   Medication Sig Dispense Refill    alendronate (FOSAMAX) 70 MG tablet TAKE 1 TABLET EVERY 7 DAYS 12 tablet 3    amLODIPine (NORVASC) 5 MG tablet Take 1 tablet (5 mg total) by mouth once daily. 90 tablet 3    ascorbic acid, vitamin C, (VITAMIN C) 1000 MG tablet Take 1,000 mg by mouth once daily. (Patient not taking: Reported on 5/5/2025)      aspirin (ECOTRIN) 81 MG EC tablet Take 81 mg by mouth once daily.      calcium carbonate (OS-ALEK) 600 mg calcium (1,500 mg) Tab Take 600 mg by mouth once.      cyanocobalamin (VITAMIN B-12) 1000 MCG tablet Take  3,000 mcg by mouth once daily. (Patient not taking: Reported on 5/5/2025)      EScitalopram oxalate (LEXAPRO) 5 MG Tab Take 1 tablet (5 mg total) by mouth once daily. 90 tablet 3    eszopiclone (LUNESTA) 1 MG Tab TAKE 1 TABLET EVERY NIGHT AS NEEDED FOR SLEEP 90 tablet 0    ibuprofen (ADVIL,MOTRIN) 200 MG tablet Take 200 mg by mouth every 6 (six) hours as needed for Pain.      Lactobacillus rhamnosus GG (CULTURELLE) 10 billion cell capsule Take 1 capsule by mouth once daily.      MULTIVIT-MIN/IRON/FOLIC/LUTEIN (CENTRUM SILVER WOMEN ORAL) Take 1 capsule by mouth once daily.      prasterone, dhea, (INTRAROSA) 6.5 mg Inst Place 1 each vaginally every evening. (Patient not taking: Reported on 5/5/2025) 28 each 11    pravastatin (PRAVACHOL) 10 MG tablet TAKE 1 TABLET EVERY DAY 90 tablet 2    tumeric-ging-olive-oreg-capryl 100 mg-150 mg- 50 mg-150 mg Cap Take by mouth.      vitamin D 1000 units Tab Take 2,000 mg by mouth 2 (two) times a day.      vitamin E 400 UNIT capsule Take 1,000 Units by mouth once daily.  (Patient not taking: Reported on 5/5/2025)       No current facility-administered medications on file prior to visit.   [2]   Social History  Socioeconomic History    Marital status:     Number of children: 2   Occupational History    Occupation: retired sales management   Tobacco Use    Smoking status: Every Day     Current packs/day: 0.25     Average packs/day: 0.3 packs/day for 45.0 years (11.3 ttl pk-yrs)     Types: Cigarettes    Smokeless tobacco: Never    Tobacco comments:     Pt states she smokes 5-6 cigarettes a day.   Substance and Sexual Activity    Alcohol use: Yes     Alcohol/week: 2.0 standard drinks of alcohol     Types: 2 Shots of liquor per week     Comment: 2 cocktails daily; wine 5 glasses a week.    Drug use: Never     Social Drivers of Health     Financial Resource Strain: Low Risk  (10/10/2023)    Overall Financial Resource Strain (CARDIA)     Difficulty of Paying Living Expenses: Not  very hard   Food Insecurity: No Food Insecurity (10/10/2023)    Hunger Vital Sign     Worried About Running Out of Food in the Last Year: Never true     Ran Out of Food in the Last Year: Never true   Transportation Needs: No Transportation Needs (10/10/2023)    PRAPARE - Transportation     Lack of Transportation (Medical): No     Lack of Transportation (Non-Medical): No   Physical Activity: Insufficiently Active (10/10/2023)    Exercise Vital Sign     Days of Exercise per Week: 3 days     Minutes of Exercise per Session: 30 min   Stress: Stress Concern Present (10/10/2023)    [employer] - Occupational Stress Questionnaire     Feeling of Stress : To some extent   Housing Stability: Low Risk  (10/10/2023)    Housing Stability Vital Sign     Unable to Pay for Housing in the Last Year: No     Number of Places Lived in the Last Year: 1     Unstable Housing in the Last Year: No

## 2025-06-09 DIAGNOSIS — F51.01 PRIMARY INSOMNIA: ICD-10-CM

## 2025-06-09 NOTE — TELEPHONE ENCOUNTER
Care Due:                  Date            Visit Type   Department     Provider  --------------------------------------------------------------------------------                                EP -                              PRIMARY      [facility] FAMILY  Last Visit: 12-      CARE ([facility])   MEDICINE       RIMMA Leo                              MYCHART                              ANNUAL                              CHECKUP/PHY  [facility]  Next Visit: 06-      [facility]       RIMMA Leo                                                            Last  Test          Frequency    Reason                     Performed    Due Date  --------------------------------------------------------------------------------    Mg Level....  12 months..  alendronate..............  Not Found    Overdue    Phosphate...  12 months..  alendronate..............  Not Found    Overdue    [employer] Embedded Care Due Messages. Reference number: [id number].   6/09/2025 8:22:18 AM CDT

## 2025-06-13 DIAGNOSIS — M81.0 OSTEOPOROSIS, UNSPECIFIED OSTEOPOROSIS TYPE, UNSPECIFIED PATHOLOGICAL FRACTURE PRESENCE: ICD-10-CM

## 2025-06-13 NOTE — TELEPHONE ENCOUNTER
No care due was identified.  [facility] Embedded Care Due Messages. Reference number: [id number].   6/13/2025 7:41:28 AM CDT

## 2025-06-16 RX ORDER — ALENDRONATE SODIUM 70 MG/1
TABLET ORAL
Qty: 12 TABLET | Refills: 3 | Status: SHIPPED | OUTPATIENT
Start: 2025-06-16

## 2025-06-16 RX ORDER — ESZOPICLONE 1 MG/1
1 TABLET, FILM COATED ORAL NIGHTLY PRN
Qty: 90 TABLET | Refills: 0 | Status: SHIPPED | OUTPATIENT
Start: 2025-06-16

## 2025-07-28 ENCOUNTER — PATIENT MESSAGE (OUTPATIENT)
Dept: FAMILY MEDICINE | Facility: CLINIC | Age: 75
End: 2025-07-28
Payer: MEDICARE

## 2025-07-30 ENCOUNTER — OFFICE VISIT (OUTPATIENT)
Dept: FAMILY MEDICINE | Facility: CLINIC | Age: 75
End: 2025-07-30
Payer: MEDICARE

## 2025-07-30 VITALS — HEIGHT: 67 IN | BODY MASS INDEX: 25.67 KG/M2 | WEIGHT: 163.56 LBS | OXYGEN SATURATION: 95 % | HEART RATE: 77 BPM

## 2025-07-30 DIAGNOSIS — M25.551 RIGHT HIP PAIN: Primary | ICD-10-CM

## 2025-07-30 DIAGNOSIS — F51.01 PRIMARY INSOMNIA: ICD-10-CM

## 2025-07-30 PROCEDURE — 1159F MED LIST DOCD IN RCRD: CPT | Mod: CPTII,S$GLB,, | Performed by: FAMILY MEDICINE

## 2025-07-30 PROCEDURE — 99999 PR PBB SHADOW E&M-EST. PATIENT-LVL III: CPT | Mod: PBBFAC,,, | Performed by: FAMILY MEDICINE

## 2025-07-30 PROCEDURE — 1160F RVW MEDS BY RX/DR IN RCRD: CPT | Mod: CPTII,S$GLB,, | Performed by: FAMILY MEDICINE

## 2025-07-30 PROCEDURE — 1101F PT FALLS ASSESS-DOCD LE1/YR: CPT | Mod: CPTII,S$GLB,, | Performed by: FAMILY MEDICINE

## 2025-07-30 PROCEDURE — 3288F FALL RISK ASSESSMENT DOCD: CPT | Mod: CPTII,S$GLB,, | Performed by: FAMILY MEDICINE

## 2025-07-30 PROCEDURE — 1125F AMNT PAIN NOTED PAIN PRSNT: CPT | Mod: CPTII,S$GLB,, | Performed by: FAMILY MEDICINE

## 2025-07-30 PROCEDURE — 3008F BODY MASS INDEX DOCD: CPT | Mod: CPTII,S$GLB,, | Performed by: FAMILY MEDICINE

## 2025-07-30 PROCEDURE — 99213 OFFICE O/P EST LOW 20 MIN: CPT | Mod: S$GLB,,, | Performed by: FAMILY MEDICINE

## 2025-07-30 RX ORDER — MELOXICAM 15 MG/1
15 TABLET ORAL DAILY
Qty: 90 TABLET | Refills: 1 | Status: SHIPPED | OUTPATIENT
Start: 2025-07-30

## 2025-07-30 NOTE — PROGRESS NOTES
Subjective:       Patient ID: Bev Miguel is a 74 y.o. female.    Chief Complaint: Joint Pain and Back Pain    Pt is known to me.  The pt reports recent joint pain and stiffness.   She saw her ortho re: her right hip.  She is getting PT.   She is going to the pool at [facility].  She is still having days with severe right hip pain.   She is not able to exercise.   She has chronic back pain but she says that the hip pain is not like sciatica.  She has a joint supplement she is about to start.  The pt continues to requies Lunesta n for restful sleep.  She has no ill effects nor residual daytime drowsiness.   The pt is compliant with regulations per  website.      Joint Pain    Back Pain      Review of Systems   Musculoskeletal:  Positive for back pain.       Objective:      Physical Exam  Vitals and nursing note reviewed.   Constitutional:       Appearance: She is well-developed.   HENT:      Head: Normocephalic.   Eyes:      Conjunctiva/sclera: Conjunctivae normal.      Pupils: Pupils are equal, round, and reactive to light.   Neck:      Thyroid: No thyromegaly.   Cardiovascular:      Rate and Rhythm: Normal rate and regular rhythm.      Heart sounds: Normal heart sounds.   Pulmonary:      Effort: Pulmonary effort is normal.      Breath sounds: Normal breath sounds.   Abdominal:      General: Bowel sounds are normal.      Palpations: Abdomen is soft.      Tenderness: There is no abdominal tenderness.   Musculoskeletal:         General: No tenderness or deformity. Normal range of motion.      Cervical back: Normal range of motion and neck supple.   Lymphadenopathy:      Cervical: No cervical adenopathy.   Skin:     General: Skin is warm and dry.   Neurological:      Mental Status: She is alert and oriented to person, place, and time.      Cranial Nerves: No cranial nerve deficit.      Motor: No abnormal muscle tone.      Coordination: Coordination normal.      Deep Tendon Reflexes: Reflexes normal.    Psychiatric:         Behavior: Behavior normal.         Assessment:       1. Right hip pain    2. Primary insomnia        Plan:       Bev was seen today for joint pain and back pain.    Diagnoses and all orders for this visit:    Right hip pain  -     meloxicam (MOBIC) 15 MG tablet; Take 1 tablet (15 mg total) by mouth once daily. With largest meal of the day    Primary insomnia  Comments:  Continue Lunesta      During this visit, I reviewed the pt's history, medications, allergies, and problem list.

## 2025-08-15 ENCOUNTER — PATIENT MESSAGE (OUTPATIENT)
Dept: FAMILY MEDICINE | Facility: CLINIC | Age: 75
End: 2025-08-15
Payer: MEDICARE

## 2025-08-25 ENCOUNTER — PATIENT MESSAGE (OUTPATIENT)
Dept: FAMILY MEDICINE | Facility: CLINIC | Age: 75
End: 2025-08-25
Payer: MEDICARE

## 2025-08-26 ENCOUNTER — OFFICE VISIT (OUTPATIENT)
Dept: FAMILY MEDICINE | Facility: CLINIC | Age: 75
End: 2025-08-26
Payer: MEDICARE

## 2025-08-26 VITALS — HEART RATE: 87 BPM | HEIGHT: 67 IN | WEIGHT: 164.69 LBS | OXYGEN SATURATION: 99 % | BODY MASS INDEX: 25.85 KG/M2

## 2025-08-26 DIAGNOSIS — R39.9 UTI SYMPTOMS: ICD-10-CM

## 2025-08-26 DIAGNOSIS — E78.2 MIXED HYPERLIPIDEMIA: ICD-10-CM

## 2025-08-26 DIAGNOSIS — F51.01 PRIMARY INSOMNIA: ICD-10-CM

## 2025-08-26 DIAGNOSIS — I10 ESSENTIAL HYPERTENSION: ICD-10-CM

## 2025-08-26 DIAGNOSIS — N30.90 CYSTITIS: Primary | ICD-10-CM

## 2025-08-26 DIAGNOSIS — F33.0 MILD EPISODE OF RECURRENT MAJOR DEPRESSIVE DISORDER: ICD-10-CM

## 2025-08-26 LAB
BILIRUBIN, UA POC OHS: ABNORMAL
BLOOD, UA POC OHS: ABNORMAL
CLARITY, UA POC OHS: ABNORMAL
COLOR, UA POC OHS: YELLOW
GLUCOSE, UA POC OHS: NEGATIVE
KETONES, UA POC OHS: ABNORMAL
LEUKOCYTES, UA POC OHS: NEGATIVE
NITRITE, UA POC OHS: NEGATIVE
PH, UA POC OHS: 6
PROTEIN, UA POC OHS: 100
SPECIFIC GRAVITY, UA POC OHS: 1.02
UROBILINOGEN, UA POC OHS: 1

## 2025-08-26 PROCEDURE — 3008F BODY MASS INDEX DOCD: CPT | Mod: CPTII,S$GLB,, | Performed by: FAMILY MEDICINE

## 2025-08-26 PROCEDURE — 81003 URINALYSIS AUTO W/O SCOPE: CPT | Mod: QW,S$GLB,, | Performed by: FAMILY MEDICINE

## 2025-08-26 PROCEDURE — 1160F RVW MEDS BY RX/DR IN RCRD: CPT | Mod: CPTII,S$GLB,, | Performed by: FAMILY MEDICINE

## 2025-08-26 PROCEDURE — 1101F PT FALLS ASSESS-DOCD LE1/YR: CPT | Mod: CPTII,S$GLB,, | Performed by: FAMILY MEDICINE

## 2025-08-26 PROCEDURE — 99999 PR PBB SHADOW E&M-EST. PATIENT-LVL III: CPT | Mod: PBBFAC,,, | Performed by: FAMILY MEDICINE

## 2025-08-26 PROCEDURE — 1159F MED LIST DOCD IN RCRD: CPT | Mod: CPTII,S$GLB,, | Performed by: FAMILY MEDICINE

## 2025-08-26 PROCEDURE — 99214 OFFICE O/P EST MOD 30 MIN: CPT | Mod: S$GLB,,, | Performed by: FAMILY MEDICINE

## 2025-08-26 PROCEDURE — 3288F FALL RISK ASSESSMENT DOCD: CPT | Mod: CPTII,S$GLB,, | Performed by: FAMILY MEDICINE

## 2025-08-26 PROCEDURE — G2211 COMPLEX E/M VISIT ADD ON: HCPCS | Mod: S$GLB,,, | Performed by: FAMILY MEDICINE

## 2025-08-26 RX ORDER — LANOLIN ALCOHOL/MO/W.PET/CERES
400 CREAM (GRAM) TOPICAL DAILY
COMMUNITY

## 2025-08-26 RX ORDER — ESZOPICLONE 1 MG/1
1 TABLET, FILM COATED ORAL NIGHTLY PRN
Qty: 90 TABLET | Refills: 1 | Status: SHIPPED | OUTPATIENT
Start: 2025-08-26

## 2025-08-26 RX ORDER — CEFDINIR 300 MG/1
300 CAPSULE ORAL 2 TIMES DAILY
Qty: 14 CAPSULE | Refills: 0 | Status: SHIPPED | OUTPATIENT
Start: 2025-08-26 | End: 2025-09-02